# Patient Record
Sex: MALE | Race: WHITE | Employment: OTHER | URBAN - METROPOLITAN AREA
[De-identification: names, ages, dates, MRNs, and addresses within clinical notes are randomized per-mention and may not be internally consistent; named-entity substitution may affect disease eponyms.]

---

## 2017-01-25 RX ORDER — LISINOPRIL AND HYDROCHLOROTHIAZIDE 25; 20 MG/1; MG/1
1 TABLET ORAL
Qty: 90 TABLET | Refills: 0 | OUTPATIENT
Start: 2017-01-25

## 2017-01-25 NOTE — TELEPHONE ENCOUNTER
From: Jose L Marcial  To:  Anthony Coffman MD  Sent: 1/23/2017 9:31 AM CST  Subject: Medication Renewal Request    Original authorizing provider: MD Jose L Melendez would like a refill of the following medications:  Lisinopril-Hydrochloroth

## 2017-01-25 NOTE — TELEPHONE ENCOUNTER
Hypertensive Medications: Protocol failed, please advise on prescription request for Lisinopril-HCTZ. LOV >6 months No future OV appt noted.     Protocol Criteria:  · Appointment scheduled in the past 6 months or in the next 3 months  · BMP or CMP in th

## 2017-01-27 NOTE — TELEPHONE ENCOUNTER
Dr. Tejinder Santana - Appointment scheduled 1/31    States will be out of medication by Sunday. Requesting refill.

## 2017-01-28 RX ORDER — LISINOPRIL AND HYDROCHLOROTHIAZIDE 25; 20 MG/1; MG/1
1 TABLET ORAL
Qty: 90 TABLET | Refills: 0 | Status: SHIPPED | OUTPATIENT
Start: 2017-01-28 | End: 2017-01-31

## 2017-01-31 ENCOUNTER — OFFICE VISIT (OUTPATIENT)
Dept: FAMILY MEDICINE CLINIC | Facility: CLINIC | Age: 82
End: 2017-01-31

## 2017-01-31 VITALS
HEART RATE: 65 BPM | SYSTOLIC BLOOD PRESSURE: 134 MMHG | TEMPERATURE: 98 F | DIASTOLIC BLOOD PRESSURE: 82 MMHG | BODY MASS INDEX: 34 KG/M2 | WEIGHT: 230 LBS

## 2017-01-31 DIAGNOSIS — I10 ESSENTIAL HYPERTENSION: Primary | ICD-10-CM

## 2017-01-31 PROCEDURE — 99213 OFFICE O/P EST LOW 20 MIN: CPT | Performed by: FAMILY MEDICINE

## 2017-01-31 PROCEDURE — G0463 HOSPITAL OUTPT CLINIC VISIT: HCPCS | Performed by: FAMILY MEDICINE

## 2017-01-31 RX ORDER — LISINOPRIL AND HYDROCHLOROTHIAZIDE 25; 20 MG/1; MG/1
1 TABLET ORAL
Qty: 90 TABLET | Refills: 1 | Status: SHIPPED | OUTPATIENT
Start: 2017-01-31 | End: 2017-04-29

## 2017-02-01 NOTE — PROGRESS NOTES
HPI:    Patient ID: Leeann Ibanez is a 80year old male. HTN  This is a chronic problem. The current episode started more than 1 year ago. The problem is controlled. Associated symptoms include malaise/fatigue.  Pertinent negatives include no anxiety, diagnosis)  cpm    Orders Placed This Encounter  Comp Metabolic Panel (14) [E]  TSH [E]  CBC W Differential W Platelet [E]    Meds This Visit:  Signed Prescriptions Disp Refills    Lisinopril-Hydrochlorothiazide 20-25 MG Oral Tab 90 tablet 1      Sig: Take

## 2017-03-13 RX ORDER — LEVETIRACETAM 750 MG/1
750 TABLET ORAL 2 TIMES DAILY
Qty: 180 TABLET | Refills: 0 | Status: SHIPPED | OUTPATIENT
Start: 2017-03-13 | End: 2017-06-19

## 2017-03-13 NOTE — TELEPHONE ENCOUNTER
Drug: Levetiracetam 750 mg    Last refill: 12/19/2017    Last office visit: 02/18/2016    Next appointment: No future appt

## 2017-03-13 NOTE — TELEPHONE ENCOUNTER
From: Charmain Prader  To: Beau Al MD  Sent: 3/11/2017 10:55 AM CST  Subject: Medication Renewal Request    Original authorizing provider: Jovanni Lang MD, MD Charmain Prader would like a refill of the following medications:  levetiracetam

## 2017-04-20 ENCOUNTER — LAB ENCOUNTER (OUTPATIENT)
Dept: LAB | Age: 82
End: 2017-04-20
Attending: RADIOLOGY
Payer: MEDICARE

## 2017-04-20 DIAGNOSIS — C61 MALIGNANT NEOPLASM OF PROSTATE (HCC): Primary | ICD-10-CM

## 2017-04-20 DIAGNOSIS — I10 ESSENTIAL HYPERTENSION: ICD-10-CM

## 2017-04-20 PROCEDURE — 85025 COMPLETE CBC W/AUTO DIFF WBC: CPT

## 2017-04-20 PROCEDURE — 80053 COMPREHEN METABOLIC PANEL: CPT

## 2017-04-20 PROCEDURE — 84153 ASSAY OF PSA TOTAL: CPT

## 2017-04-20 PROCEDURE — 84443 ASSAY THYROID STIM HORMONE: CPT

## 2017-04-20 PROCEDURE — 36415 COLL VENOUS BLD VENIPUNCTURE: CPT

## 2017-04-29 RX ORDER — LISINOPRIL AND HYDROCHLOROTHIAZIDE 25; 20 MG/1; MG/1
1 TABLET ORAL
Qty: 90 TABLET | Refills: 0 | Status: SHIPPED | OUTPATIENT
Start: 2017-04-29 | End: 2017-07-31

## 2017-04-29 NOTE — TELEPHONE ENCOUNTER
Refilled per written protocol.     Hypertensive Medications  Protocol Criteria:  · Appointment scheduled in the past 6 months or in the next 3 months  · BMP or CMP in the past 12 months  · Creatinine result < 2  Recent Visits       Provider Department [facility]

## 2017-04-29 NOTE — TELEPHONE ENCOUNTER
From: Sonja Casiano  To:  Cecil Draper MD  Sent: 4/24/2017 11:41 AM CDT  Subject: Medication Renewal Request    Original authorizing provider: MD Sonja Fitch would like a refill of the following medications:  Kalee Rodriguez

## 2017-05-03 ENCOUNTER — OFFICE VISIT (OUTPATIENT)
Dept: FAMILY MEDICINE CLINIC | Facility: CLINIC | Age: 82
End: 2017-05-03

## 2017-05-03 VITALS
WEIGHT: 230 LBS | TEMPERATURE: 98 F | HEART RATE: 69 BPM | DIASTOLIC BLOOD PRESSURE: 80 MMHG | SYSTOLIC BLOOD PRESSURE: 127 MMHG | BODY MASS INDEX: 34 KG/M2

## 2017-05-03 DIAGNOSIS — R22.32 FINGER MASS, LEFT: Primary | ICD-10-CM

## 2017-05-03 DIAGNOSIS — J30.89 NON-SEASONAL ALLERGIC RHINITIS, UNSPECIFIED ALLERGIC RHINITIS TRIGGER: ICD-10-CM

## 2017-05-03 PROCEDURE — G0463 HOSPITAL OUTPT CLINIC VISIT: HCPCS | Performed by: FAMILY MEDICINE

## 2017-05-03 PROCEDURE — 99213 OFFICE O/P EST LOW 20 MIN: CPT | Performed by: FAMILY MEDICINE

## 2017-05-03 RX ORDER — FLUTICASONE PROPIONATE 50 MCG
2 SPRAY, SUSPENSION (ML) NASAL DAILY
Qty: 1 BOTTLE | Refills: 3 | Status: SHIPPED | OUTPATIENT
Start: 2017-05-03 | End: 2018-04-28

## 2017-05-03 NOTE — PROGRESS NOTES
HPI:    Patient ID: Javon Rojas is a 80year old male. HPI    Review of Systems   Constitutional: Positive for fatigue. Negative for chills, diaphoresis, activity change and appetite change. HENT: Positive for postnasal drip.  Negative for dental p Suspension 1 Bottle 3      Si sprays by Each Nare route daily.            Imaging & Referrals:  None       #[id number]

## 2017-06-25 RX ORDER — LEVETIRACETAM 750 MG/1
TABLET ORAL
Qty: 180 TABLET | Refills: 0 | Status: SHIPPED | OUTPATIENT
Start: 2017-06-25 | End: 2017-09-14

## 2017-08-02 RX ORDER — TAMSULOSIN HYDROCHLORIDE 0.4 MG/1
0.4 CAPSULE ORAL DAILY
Qty: 90 CAPSULE | Refills: 1 | Status: SHIPPED
Start: 2017-08-02 | End: 2018-02-05

## 2017-08-06 RX ORDER — LISINOPRIL AND HYDROCHLOROTHIAZIDE 25; 20 MG/1; MG/1
1 TABLET ORAL
Qty: 90 TABLET | Refills: 0 | Status: CANCELLED
Start: 2017-08-06

## 2017-08-07 RX ORDER — LISINOPRIL AND HYDROCHLOROTHIAZIDE 25; 20 MG/1; MG/1
1 TABLET ORAL
Qty: 90 TABLET | Refills: 0 | Status: SHIPPED | OUTPATIENT
Start: 2017-08-07 | End: 2017-12-18

## 2017-08-07 NOTE — TELEPHONE ENCOUNTER
From: Denise Kimble  Sent: 7/31/2017 1:01 PM CDT  Subject: Medication Renewal Request    Denise Kimble would like a refill of the following medications:  Lisinopril-Hydrochlorothiazide 20-25 MG Oral Tab Brendon Mckinley MD]    Preferred pharmacy: [facility]

## 2017-08-07 NOTE — TELEPHONE ENCOUNTER
Hypertensive Medications  Protocol Criteria:  · Appointment scheduled in the past 6 months or in the next 3 months  · BMP or CMP in the past 12 months  · Creatinine result < 2  Recent Outpatient Visits            3 months ago Finger mass, left    [place]

## 2017-08-09 NOTE — TELEPHONE ENCOUNTER
From: Javon Rojas  Sent: 8/6/2017 7:00 PM CDT  Subject: Medication Renewal Request    Javon Rojas would like a refill of the following medications:  Lisinopril-Hydrochlorothiazide 20-25 MG Oral Tab Nancy Ellison MD]    Preferred pharmacy: [facility]

## 2017-08-28 ENCOUNTER — TELEPHONE (OUTPATIENT)
Dept: OPHTHALMOLOGY | Facility: CLINIC | Age: 82
End: 2017-08-28

## 2017-08-28 NOTE — TELEPHONE ENCOUNTER
Pt would appt in October, states his vision has changed and needs new prescription for drivers test. Pls advise thank you.

## 2017-09-09 ENCOUNTER — TELEPHONE (OUTPATIENT)
Dept: NEUROLOGY | Facility: CLINIC | Age: 82
End: 2017-09-09

## 2017-09-09 RX ORDER — LEVETIRACETAM 750 MG/1
TABLET ORAL
Qty: 180 TABLET | Refills: 0 | Status: CANCELLED
Start: 2017-09-09

## 2017-09-11 NOTE — TELEPHONE ENCOUNTER
From: Dillon Doe  Sent: 9/9/2017 11:17 AM CDT  Subject: Medication Renewal Request    Dillon Doe would like a refill of the following medications:  LEVETIRACETAM 750 MG Oral Tab Carmine Donohue MD, MD]    [facility] [address] [phone], [phone]    Comment:

## 2017-09-14 ENCOUNTER — OFFICE VISIT (OUTPATIENT)
Dept: NEUROLOGY | Facility: CLINIC | Age: 82
End: 2017-09-14

## 2017-09-14 VITALS
SYSTOLIC BLOOD PRESSURE: 130 MMHG | RESPIRATION RATE: 17 BRPM | DIASTOLIC BLOOD PRESSURE: 78 MMHG | HEART RATE: 68 BPM | BODY MASS INDEX: 33.45 KG/M2 | WEIGHT: 231 LBS | HEIGHT: 69.5 IN

## 2017-09-14 DIAGNOSIS — G40.909 SEIZURE DISORDER (HCC): Primary | ICD-10-CM

## 2017-09-14 DIAGNOSIS — G47.9 SLEEP DISORDER: ICD-10-CM

## 2017-09-14 PROCEDURE — 99213 OFFICE O/P EST LOW 20 MIN: CPT | Performed by: OTHER

## 2017-09-14 RX ORDER — LEVETIRACETAM 750 MG/1
750 TABLET ORAL 2 TIMES DAILY
Qty: 180 TABLET | Refills: 3 | Status: SHIPPED | OUTPATIENT
Start: 2017-09-14 | End: 2018-03-06

## 2017-09-14 NOTE — PROGRESS NOTES
Javon Rojas : 1929     HPI:   Patient presents with:  TIA: pt here for follow up on seizures and post mini stroke 6-7 years ago. last seizure over 3 years ago.  pt c/o inability to sleep and fatigue and dizzy spells when bending over and going Prostate cancer [facility])    • Seizure disorder ([facility] [address]    • TIA (transient ischemic attack)    • Unspecified essential hypertension       Past Surgical History:  No date: APPENDECTOMY  2/17/14: CATARACT EXTRACTION W/  INTRAOCULAR LENS IMPLA* Right      Comment: breath, wheezing or cough   CARDIOVASCULAR: denies chest pain or PINO; no palpitations   GI: denies nausea, vomiting, constipation, diarrhea; no heartburn  GENITAL/: Frequency and nocturia  MUSCULOSKELETAL: no joint complaints upper or lower extremities poor nighttime sleep. We discussed the sleep study, but he feels that the problem is related to his nocturia. I asked him to call me if he should develop any worsening of his symptoms. Thank you very much. Return in about 1 year (around 9/14/2018).

## 2017-09-26 ENCOUNTER — OFFICE VISIT (OUTPATIENT)
Dept: OPHTHALMOLOGY | Facility: CLINIC | Age: 82
End: 2017-09-26

## 2017-09-26 DIAGNOSIS — H43.391 VITREOUS FLOATERS OF RIGHT EYE: Primary | ICD-10-CM

## 2017-09-26 DIAGNOSIS — H35.89 RPE MOTTLING OF MACULA: ICD-10-CM

## 2017-09-26 DIAGNOSIS — Z96.1 PSEUDOPHAKIA OF BOTH EYES: ICD-10-CM

## 2017-09-26 DIAGNOSIS — Z98.890 HISTORY OF VITRECTOMY: ICD-10-CM

## 2017-09-26 PROCEDURE — 92014 COMPRE OPH EXAM EST PT 1/>: CPT | Performed by: OPHTHALMOLOGY

## 2017-09-26 PROCEDURE — 92015 DETERMINE REFRACTIVE STATE: CPT | Performed by: OPHTHALMOLOGY

## 2017-09-26 NOTE — PATIENT INSTRUCTIONS
RPE mottling of macula left eye  Continue to follow up with Dr. Alex Louise as planned. Pseudophakia of both eyes  No treatment. New glasses today; update as needed. Discussed that new prescription is only a slight change.    's license vision form

## 2017-09-26 NOTE — ASSESSMENT & PLAN NOTE
Patient had vitrectomy in the left eye in May of 2016 with Dr. Flavia Pimentel. Continue to follow up with Dr. Flavia Pimentel as directed.

## 2017-09-26 NOTE — ASSESSMENT & PLAN NOTE
No treatment. New glasses today; update as needed. Discussed that new prescription is only a slight change. 's license vision form completed today.

## 2017-09-26 NOTE — PROGRESS NOTES
Da See is a 80year old male. HPI:     HPI     EP. Patient is here for a complete eye exam.  Patient last saw Dr. Josie Low on  2/24/17 (see note). Patient states that he has a difficult time reading street signs for the past 6 months.   He al mouth once daily. Disp: 90 tablet Rfl: 0   tamsulosin HCl (FLOMAX) 0.4 MG Oral Cap Take 1 capsule (0.4 mg total) by mouth daily. Disp: 90 capsule Rfl: 1   Fluticasone Propionate 50 MCG/ACT Nasal Suspension 2 sprays by Each Nare route daily.  Disp: 1 Bottle [place]    Right -1.00 [place]  20/25- +3.00 20/20    Left -1.50 +1.00 160 20/60- +3.00 20/40-    Type:  Flat top bifocal                 ASSESSMENT/PLAN:     Diagnoses and Plan:     RPE mottling of macula left eye  Continue to follow up with Dr. Josie Low

## 2017-09-27 ENCOUNTER — OFFICE VISIT (OUTPATIENT)
Dept: SURGERY | Facility: CLINIC | Age: 82
End: 2017-09-27

## 2017-09-27 VITALS
HEIGHT: 69 IN | SYSTOLIC BLOOD PRESSURE: 110 MMHG | WEIGHT: 225 LBS | TEMPERATURE: 98 F | BODY MASS INDEX: 33.33 KG/M2 | DIASTOLIC BLOOD PRESSURE: 78 MMHG

## 2017-09-27 DIAGNOSIS — C61 PROSTATE CANCER (HCC): Primary | ICD-10-CM

## 2017-09-27 DIAGNOSIS — N28.1 KIDNEY CYST, ACQUIRED: ICD-10-CM

## 2017-09-27 DIAGNOSIS — N19 RENAL FAILURE, UNSPECIFIED CHRONICITY: ICD-10-CM

## 2017-09-27 DIAGNOSIS — R35.1 NOCTURIA: ICD-10-CM

## 2017-09-27 LAB
BILIRUB UR QL: NEGATIVE
CLARITY UR: CLEAR
COLOR UR: YELLOW
GLUCOSE UR-MCNC: NEGATIVE MG/DL
HGB UR QL STRIP.AUTO: NEGATIVE
KETONES UR-MCNC: NEGATIVE MG/DL
LEUKOCYTE ESTERASE UR QL STRIP.AUTO: NEGATIVE
NITRITE UR QL STRIP.AUTO: NEGATIVE
PH UR: 5 [PH] (ref 5–8)
PROT UR-MCNC: NEGATIVE MG/DL
SP GR UR STRIP: 1.02 (ref 1–1.03)
UROBILINOGEN UR STRIP-ACNC: <2
VIT C UR-MCNC: NEGATIVE MG/DL

## 2017-09-27 PROCEDURE — 99204 OFFICE O/P NEW MOD 45 MIN: CPT | Performed by: UROLOGY

## 2017-09-27 PROCEDURE — G0463 HOSPITAL OUTPT CLINIC VISIT: HCPCS | Performed by: UROLOGY

## 2017-09-27 NOTE — PROGRESS NOTES
Bernardino Park is a 80year old male. HPI:   Patient presents with:  Prostate Cancer          History provided by pt.     Prostate cancer  1/29/2011 Stage T1c, [place] 3+3 adenocarcinoma of the prostate; low volume; cancer only found in the right base - previous records: 11/29/2011 Office visit with me; Stage T1c, [place] 3+3 adenocarcinoma of the prostate; low volume; cancer only found in the right base - 5% of tissue with other regions being negative; 14 biopsies on November 9, 2011; Normal PSA of 2.4 o Comment: Per NG: adenoma  approx.  2006: KNEE REPLACEMENT SURGERY Left  2014: OTHER SURGICAL HISTORY Right      Comment: Per NG: Post op CME -- [facility] / 2010: VASECTOMY Left      Comment: Cortez ARELLANO: Flo Kawasaki with CME  9/1/10: Bita Mcpherson irritability and lethargy  ENMT:  Negative for ear drainage, hearing loss and nasal drainage  Eyes:  Negative for eye discharge and vision loss  Hema/Lymph:  Negative for easy bleeding and easy bruising  Integumentary:  Negative for pruritus and rash  Musc 50  5/3/2016 PSA = 0.1  5/8/2015 PSA = 0.1      UROLOGICAL IMAGING   11/18/2011 CT abdomen and pelvis with contrast -- The kidneys show symmetric size, enhancement, and excretion, without obstructive uropathy or hydroureteronephrosis.  Right kidney contains renal cysts. Patient will receive Kidney US before his next visit.     (R35.1) Nocturia  His American urologic Association voiding score is  11,  moderate  voiding dysfunction category.  I fully explained benefits, risks, alternatives of continuing tamsulos can contribute to waking up at night to urinate. 3. If your legs swell during the day, please keep your legs elevated at night since swollen legs can contribute to waking up at night to urinate.      4. Please significantly restrict fluids for 3 hours b

## 2017-09-27 NOTE — PATIENT INSTRUCTIONS
1. Urine specimen today for complete urinalysis    2. Kidney ultrasound to investigate mildly impaired kidney function; it will also give us know up information on the cyst that he had in your kidneys on CT 2011    3.   Continue tamsulosin 0.4 mg daily of rest, exercise, and eat well. This will give your body the extra strength it needs right now. Also, look to family and friends for support and comfort. Regular walking can help control some side effects.    Family and friends  You will decide when yo treatment (and during chemotherapy), be sure to take steps to prevent pregnancy. Will treatment make me radioactive?   If you are worried about treatment making you radioactive, know that:   · External radiation therapy or X-ray therapy of any kind will no

## 2017-10-02 ENCOUNTER — HOSPITAL ENCOUNTER (OUTPATIENT)
Dept: ULTRASOUND IMAGING | Facility: HOSPITAL | Age: 82
Discharge: HOME | End: 2017-10-02
Attending: UROLOGY
Payer: MEDICARE

## 2017-10-02 DIAGNOSIS — N28.1 KIDNEY CYST, ACQUIRED: ICD-10-CM

## 2017-10-02 DIAGNOSIS — N19 RENAL FAILURE, UNSPECIFIED CHRONICITY: ICD-10-CM

## 2017-10-02 PROCEDURE — 76770 US EXAM ABDO BACK WALL COMP: CPT | Performed by: UROLOGY

## 2017-10-03 ENCOUNTER — OFFICE VISIT (OUTPATIENT)
Dept: PODIATRY CLINIC | Facility: CLINIC | Age: 82
End: 2017-10-03

## 2017-10-03 DIAGNOSIS — M79.675 PAIN IN TOES OF BOTH FEET: Primary | ICD-10-CM

## 2017-10-03 DIAGNOSIS — B35.1 ONYCHOMYCOSIS: ICD-10-CM

## 2017-10-03 DIAGNOSIS — M79.674 PAIN IN TOES OF BOTH FEET: Primary | ICD-10-CM

## 2017-10-03 PROCEDURE — 99212 OFFICE O/P EST SF 10 MIN: CPT | Performed by: PODIATRIST

## 2017-10-03 PROCEDURE — 11720 DEBRIDE NAIL 1-5: CPT | Performed by: PODIATRIST

## 2017-10-03 NOTE — PROGRESS NOTES
HPI:    Patient ID: Bernardino Park is a 80year old male. HPI  Pleasant 26-year-old male presents to me today as a new patient.   He has been a patient of Dr. Saturnino Kasper in the past.  Patient's primary complaint today is pain associated with both of his gr infection no open or draining was noted.   I anticipate relief by this care and will see patient for treatment if and when symptoms redevelop         ASSESSMENT/PLAN:   Pain in toes of both feet  (primary encounter diagnosis)  Onychomycosis    No orders of

## 2017-10-06 ENCOUNTER — PATIENT MESSAGE (OUTPATIENT)
Dept: SURGERY | Facility: CLINIC | Age: 82
End: 2017-10-06

## 2017-10-06 NOTE — TELEPHONE ENCOUNTER
I sent patient hte following message by [facility] chart\" =      10/2/17  Kidney ultrasound showed NO tumors or stones or blockage of the kidneys;   It did show both kidneys to have simple cysts, but these are common, do NOT cause any problems, are NOT pre-canc

## 2017-10-06 NOTE — TELEPHONE ENCOUNTER
From: Jewell Salcido  To: Adolph Schreiber MD  Sent: 10/6/2017 3:58 PM CDT  Subject: Test Results Question    What are the results of my 10/2/2017 kidney ultrasound test?

## 2017-12-19 NOTE — TELEPHONE ENCOUNTER
please advise as does not meet RN protocol for refill criteria    Hypertensive Medications  Protocol Criteria:  · Appointment scheduled in the past 6 months or in the next 3 months  · BMP or CMP in the past 12 months  · Creatinine result < 2  Recent Outpa

## 2017-12-21 RX ORDER — LISINOPRIL AND HYDROCHLOROTHIAZIDE 25; 20 MG/1; MG/1
1 TABLET ORAL
Qty: 90 TABLET | Refills: 0 | Status: SHIPPED | OUTPATIENT
Start: 2017-12-21 | End: 2018-04-27

## 2018-02-01 ENCOUNTER — APPOINTMENT (OUTPATIENT)
Dept: RADIATION ONCOLOGY | Facility: HOSPITAL | Age: 83
End: 2018-02-01
Attending: RADIOLOGY
Payer: MEDICARE

## 2018-02-05 RX ORDER — TAMSULOSIN HYDROCHLORIDE 0.4 MG/1
0.4 CAPSULE ORAL DAILY
Qty: 90 CAPSULE | Refills: 1
Start: 2018-02-05 | End: 2018-02-13

## 2018-02-05 NOTE — TELEPHONE ENCOUNTER
From: Wanda Vasquez  Sent: 2/5/2018 3:27 PM CST  Subject: Medication Renewal Request    Wanda Vasquez would like a refill of the following medications:     tamsulosin HCl (FLOMAX) 0.4 MG Oral Cap Daniel Navarro MD]   Patient Comment[facility] Please mail R x to me. I have to find new pharmacy!     Preferred pharmacy: Other - To be determined

## 2018-02-13 RX ORDER — TAMSULOSIN HYDROCHLORIDE 0.4 MG/1
0.4 CAPSULE ORAL DAILY
Qty: 90 CAPSULE | Refills: 1 | Status: SHIPPED | OUTPATIENT
Start: 2018-02-13 | End: 2019-05-17

## 2018-03-06 ENCOUNTER — PATIENT MESSAGE (OUTPATIENT)
Dept: NEUROLOGY | Facility: CLINIC | Age: 83
End: 2018-03-06

## 2018-03-06 RX ORDER — LEVETIRACETAM 750 MG/1
750 TABLET ORAL 2 TIMES DAILY
Qty: 180 TABLET | Refills: 1 | Status: SHIPPED | OUTPATIENT
Start: 2018-03-06 | End: 2018-09-05

## 2018-03-06 NOTE — TELEPHONE ENCOUNTER
From: Daiana Salinas  To: Elvis Paul MD  Sent: 3/6/2018 2:28 PM CST  Subject: Other    I currently have a prescription for three refills of Keppra. My pharmacy, [address] [place] [address] pharmacy, recently closed.  I need a written prescription from the doctor in order t

## 2018-03-07 ENCOUNTER — PATIENT MESSAGE (OUTPATIENT)
Dept: SURGERY | Facility: CLINIC | Age: 83
End: 2018-03-07

## 2018-03-08 NOTE — TELEPHONE ENCOUNTER
From: Daiana Salinas  To: Avelino Baker MD  Sent: 3/7/2018 9:48 AM CST  Subject: Other    Do I need a PSA blood test prior to my appointment with you on April 27, 2018. Please send me an order, if so.

## 2018-04-09 ENCOUNTER — OFFICE VISIT (OUTPATIENT)
Dept: DERMATOLOGY CLINIC | Facility: CLINIC | Age: 83
End: 2018-04-09

## 2018-04-09 ENCOUNTER — APPOINTMENT (OUTPATIENT)
Dept: LAB | Age: 83
End: 2018-04-09
Attending: UROLOGY
Payer: MEDICARE

## 2018-04-09 DIAGNOSIS — L30.8 PSORIASIFORM DERMATITIS: ICD-10-CM

## 2018-04-09 DIAGNOSIS — L82.0 INFLAMED SEBORRHEIC KERATOSIS: Primary | ICD-10-CM

## 2018-04-09 DIAGNOSIS — D23.5 BENIGN NEOPLASM OF SKIN OF TRUNK, EXCEPT SCROTUM: ICD-10-CM

## 2018-04-09 DIAGNOSIS — D23.4 BENIGN NEOPLASM OF SCALP AND SKIN OF NECK: ICD-10-CM

## 2018-04-09 DIAGNOSIS — D23.60 BENIGN NEOPLASM OF SKIN OF UPPER LIMB, INCLUDING SHOULDER, UNSPECIFIED LATERALITY: ICD-10-CM

## 2018-04-09 DIAGNOSIS — R35.1 NOCTURIA: ICD-10-CM

## 2018-04-09 DIAGNOSIS — D23.30 BENIGN NEOPLASM OF SKIN OF FACE: ICD-10-CM

## 2018-04-09 DIAGNOSIS — C61 PROSTATE CANCER (HCC): ICD-10-CM

## 2018-04-09 PROCEDURE — 99213 OFFICE O/P EST LOW 20 MIN: CPT | Performed by: DERMATOLOGY

## 2018-04-09 PROCEDURE — 84153 ASSAY OF PSA TOTAL: CPT

## 2018-04-09 PROCEDURE — G0463 HOSPITAL OUTPT CLINIC VISIT: HCPCS | Performed by: DERMATOLOGY

## 2018-04-09 PROCEDURE — 36415 COLL VENOUS BLD VENIPUNCTURE: CPT

## 2018-04-09 PROCEDURE — 81003 URINALYSIS AUTO W/O SCOPE: CPT

## 2018-04-10 ENCOUNTER — PATIENT MESSAGE (OUTPATIENT)
Dept: NEUROLOGY | Facility: CLINIC | Age: 83
End: 2018-04-10

## 2018-04-10 NOTE — TELEPHONE ENCOUNTER
From: Carlos Kern  To: Hyun Le MD  Sent: 4/10/2018 2:37 PM CDT  Subject: Other    First of all, let me thank you for treating me for my condition all these years.  While it can not be completely eliminated, you have managed to keep it under co

## 2018-04-18 ENCOUNTER — OFFICE VISIT (OUTPATIENT)
Dept: OTOLARYNGOLOGY | Facility: CLINIC | Age: 83
End: 2018-04-18

## 2018-04-18 VITALS
TEMPERATURE: 97 F | HEIGHT: 69.5 IN | SYSTOLIC BLOOD PRESSURE: 129 MMHG | WEIGHT: 225 LBS | BODY MASS INDEX: 32.58 KG/M2 | DIASTOLIC BLOOD PRESSURE: 67 MMHG

## 2018-04-18 DIAGNOSIS — R09.81 NASAL CONGESTION: ICD-10-CM

## 2018-04-18 DIAGNOSIS — H61.21 IMPACTED CERUMEN OF RIGHT EAR: Primary | ICD-10-CM

## 2018-04-18 PROCEDURE — 69210 REMOVE IMPACTED EAR WAX UNI: CPT | Performed by: OTOLARYNGOLOGY

## 2018-04-18 PROCEDURE — 99214 OFFICE O/P EST MOD 30 MIN: CPT | Performed by: OTOLARYNGOLOGY

## 2018-04-18 NOTE — PROGRESS NOTES
Shawnee Tolentino is a 80year old male.   Patient presents with:  Nose Problem: pt reports post nasal drip, stuffy nose, running nose, dry mouth, cant breath through nose going on for 1 year      HISTORY OF PRESENT ILLNESS  4/18/2018     The patient presents macular edema) 4/18/14    IVK injections by Dr. Guille Donald   • H/O prostate biopsy 2011   • High cholesterol    • Hyperlipidemia    • Prostate cancer [facility])    • Seizure disorder (HCC)    • TIA (transient ischemic attack)    • Unspecified essential hypertensi Normal. Palpation - Normal without lymphadenopathy.  Parotid gland - Normal. Thyroid gland - Normal.   Eyes Normal Conjunctiva - Right: Normal, Left: Normal. Pupil - Right: Normal, Left: Normal. Fundus - Right: Normal, Left: Normal. EOMI   Neurological Norm of hearing loss we consider this age-related and do not do any further workup. If patients have communication issues we recommend hearing aids.   This patient is medically cleared for hearing aids      - REMOVAL IMPACTED CERUMEN REQUIRING INSTRUMENTATION,

## 2018-04-22 NOTE — PROGRESS NOTES
Jose L Marcial is a 80year old male. HPI:     CC:  Patient presents with:  Derm Problem: established pt. presents for 20 months F/U for skine xam to face/head only. Pt with multiple brown, crusty lesions to scalp and temples. No hx of skin CA.          A LISINOPRIL-HYDROCHLOROTHIAZIDE 20-25 MG Oral Tab Take 1 tablet by mouth once daily. Disp: 90 tablet Rfl: 0   Fluticasone Propionate 50 MCG/ACT Nasal Suspension 2 sprays by Each Nare route daily.  Disp: 1 Bottle Rfl: 3   aspirin (ASPIR-81) 81 MG Oral Tab E The patient does not use an assistive device. .      The patient does live in a home with stairs.          Family History   Problem Relation Age of Onset   • Diabetes Neg    • Glaucoma Neg    • Macular degeneration Neg        There were no vitals filed fo encounter diagnosis)  Benign neoplasm of skin of face  Benign neoplasm of scalp and skin of neck  Benign neoplasm of skin of trunk, except scrotum  Benign neoplasm of skin of upper limb, including shoulder, unspecified laterality  Psoriasiform dermatitis

## 2018-04-25 ENCOUNTER — OFFICE VISIT (OUTPATIENT)
Dept: PODIATRY CLINIC | Facility: CLINIC | Age: 83
End: 2018-04-25

## 2018-04-25 DIAGNOSIS — M79.674 PAIN IN TOES OF BOTH FEET: Primary | ICD-10-CM

## 2018-04-25 DIAGNOSIS — M79.675 PAIN IN TOES OF BOTH FEET: Primary | ICD-10-CM

## 2018-04-25 DIAGNOSIS — B35.1 ONYCHOMYCOSIS: ICD-10-CM

## 2018-04-25 PROCEDURE — 11721 DEBRIDE NAIL 6 OR MORE: CPT | Performed by: PODIATRIST

## 2018-04-25 NOTE — PROGRESS NOTES
HPI:    Patient ID: Satya Linton is a 80year old male. HPI  This 17-year-old male presents with recurrent pain associated with his nails. He has had relief by previous debridement.   He has especially painful great toenails both medial and lateral b types were placed in this encounter.       Meds This Visit:  No prescriptions requested or ordered in this encounter    Imaging & Referrals:  None       #[id number]

## 2018-04-27 ENCOUNTER — OFFICE VISIT (OUTPATIENT)
Dept: SURGERY | Facility: CLINIC | Age: 83
End: 2018-04-27

## 2018-04-27 VITALS
BODY MASS INDEX: 33.33 KG/M2 | TEMPERATURE: 98 F | SYSTOLIC BLOOD PRESSURE: 159 MMHG | RESPIRATION RATE: 18 BRPM | HEIGHT: 69 IN | WEIGHT: 225 LBS | HEART RATE: 65 BPM | DIASTOLIC BLOOD PRESSURE: 80 MMHG

## 2018-04-27 DIAGNOSIS — C61 PROSTATE CANCER (HCC): Primary | ICD-10-CM

## 2018-04-27 DIAGNOSIS — N19 RENAL FAILURE, UNSPECIFIED CHRONICITY: ICD-10-CM

## 2018-04-27 DIAGNOSIS — R35.1 NOCTURIA: ICD-10-CM

## 2018-04-27 DIAGNOSIS — N28.1 KIDNEY CYST, ACQUIRED: ICD-10-CM

## 2018-04-27 PROCEDURE — G0463 HOSPITAL OUTPT CLINIC VISIT: HCPCS | Performed by: UROLOGY

## 2018-04-27 PROCEDURE — 99214 OFFICE O/P EST MOD 30 MIN: CPT | Performed by: UROLOGY

## 2018-04-27 RX ORDER — TAMSULOSIN HYDROCHLORIDE 0.4 MG/1
0.4 CAPSULE ORAL DAILY
Qty: 90 CAPSULE | Refills: 3 | Status: SHIPPED | OUTPATIENT
Start: 2018-04-27 | End: 2018-05-04

## 2018-04-27 NOTE — PATIENT INSTRUCTIONS
1.  Continue tamsulosin 0.4 mg daily    2   visit in 1 year. Blood draw for PSA and urinalysis urine test before the visit.    ------------------------------------------    1.  Because tea and coffee contain caffeine, the standard recommendation is not to

## 2018-04-28 NOTE — TELEPHONE ENCOUNTER
From: Page Garcia  Sent: 4/27/2018 6:27 PM CDT  Subject: Medication Renewal Request    Page Garcia would like a refill of the following medications:     LISINOPRIL-HYDROCHLOROTHIAZIDE 20-25 MG Oral Tab Tommy Rowe MD]    Preferred pharmacy: [facility]

## 2018-04-28 NOTE — TELEPHONE ENCOUNTER
Requesting Lisinopril-HCTZ refill    Failed IM/FM refill protocol, please advise    Hypertensive Medications  Protocol Criteria:  · Appointment scheduled in the past 6 months or in the next 3 months  · BMP or CMP in the past 12 months  · Creatinine result

## 2018-04-28 NOTE — PROGRESS NOTES
Lyndsey Olguin is a 80year old male. HPI:   Patient presents with:  Prostate Cancer          History provided by pt.     Prostate cancer  1/29/2011 Stage T1c, [place] 3+3 adenocarcinoma of the prostate; low volume; cancer only found in the right base - that he snores.          Review of previous records: 11/29/2011 Office visit with me; Stage T1c, [place] 3+3 adenocarcinoma of the prostate; low volume; cancer only found in the right base - 5% of tissue with other regions being negative; 14 biopsies on Nov CHOLECYSTECTOMY  No date: COLONOSCOPY      Comment: Per ADAN: adenoma  approx.  2006: KNEE REPLACEMENT SURGERY Left  2014: OTHER SURGICAL HISTORY Right      Comment: Per ADAN: Post op CME -- [facility] / 2010: VASECTOMY Left      Comment: Per ADAN: [facility]    Laboratory Data:  4/9/18 PSA 0.1;   urinalysis normal with blood negative and \"microscopic not indicated\"  4/20/2017 PSA = 0.1; Creatinine = 1.35; eGFR = 50  5/3/2016 PSA = 0.1  5/8/2015 PSA = 0.1      UROLOGICAL IMAGING     10/2/17 u observed.    (N28.1) Kidney cyst, acquired  10/2/17 kidney ultrasound shows multiple bilateral simple renal cysts with no suspicious renal lesions. Asymptomatic.   I explained treatment options and he understands and wants to be observed.    (R35.1) Noctur Please limit alcohol within 4 hours of bedtime as it can contribute to waking up at night to urinate.      6. If you take a lot of medications at bedtime, discuss with your primary physician whether any of these medications can be taken in the morning, so t

## 2018-04-29 RX ORDER — LISINOPRIL AND HYDROCHLOROTHIAZIDE 25; 20 MG/1; MG/1
1 TABLET ORAL
Qty: 90 TABLET | Refills: 0 | Status: SHIPPED
Start: 2018-04-29 | End: 2018-05-04

## 2018-05-04 ENCOUNTER — APPOINTMENT (OUTPATIENT)
Dept: LAB | Age: 83
End: 2018-05-04
Attending: FAMILY MEDICINE
Payer: MEDICARE

## 2018-05-04 ENCOUNTER — LAB ENCOUNTER (OUTPATIENT)
Dept: LAB | Age: 83
End: 2018-05-04
Attending: FAMILY MEDICINE
Payer: MEDICARE

## 2018-05-04 ENCOUNTER — OFFICE VISIT (OUTPATIENT)
Dept: FAMILY MEDICINE CLINIC | Facility: CLINIC | Age: 83
End: 2018-05-04

## 2018-05-04 VITALS
SYSTOLIC BLOOD PRESSURE: 118 MMHG | HEIGHT: 69 IN | HEART RATE: 67 BPM | TEMPERATURE: 98 F | DIASTOLIC BLOOD PRESSURE: 74 MMHG | WEIGHT: 228 LBS | BODY MASS INDEX: 33.77 KG/M2

## 2018-05-04 DIAGNOSIS — H90.5 SENSORINEURAL HEARING LOSS (SNHL), UNSPECIFIED LATERALITY: ICD-10-CM

## 2018-05-04 DIAGNOSIS — R56.9 SEIZURE (HCC): ICD-10-CM

## 2018-05-04 DIAGNOSIS — I10 ESSENTIAL HYPERTENSION: Primary | ICD-10-CM

## 2018-05-04 DIAGNOSIS — Z00.00 ENCOUNTER FOR ANNUAL HEALTH EXAMINATION: ICD-10-CM

## 2018-05-04 PROCEDURE — 93005 ELECTROCARDIOGRAM TRACING: CPT

## 2018-05-04 PROCEDURE — 84443 ASSAY THYROID STIM HORMONE: CPT

## 2018-05-04 PROCEDURE — 85025 COMPLETE CBC W/AUTO DIFF WBC: CPT

## 2018-05-04 PROCEDURE — 93010 ELECTROCARDIOGRAM REPORT: CPT | Performed by: FAMILY MEDICINE

## 2018-05-04 PROCEDURE — G0439 PPPS, SUBSEQ VISIT: HCPCS | Performed by: FAMILY MEDICINE

## 2018-05-04 PROCEDURE — 36415 COLL VENOUS BLD VENIPUNCTURE: CPT

## 2018-05-04 PROCEDURE — 80053 COMPREHEN METABOLIC PANEL: CPT

## 2018-05-04 RX ORDER — HYDROCHLOROTHIAZIDE 25 MG/1
25 TABLET ORAL DAILY
Qty: 90 TABLET | Refills: 3 | Status: SHIPPED | OUTPATIENT
Start: 2018-05-04 | End: 2018-09-19 | Stop reason: DRUGHIGH

## 2018-05-04 NOTE — PATIENT INSTRUCTIONS
[address] SCHEDULE   Tests on this list are recommended by your physician but may not be covered, or covered at this frequency, by your insurer. Please check with your insurance carrier before scheduling to verify coverage.     PREVENTYANET Screening Covered up to Age 76     Colonoscopy Screen   Covered every 10 years- more often if abnormal There are no preventive care reminders to display for this patient.  Update Health Maintenance if applicable    Flex Sigmoidoscopy Screen  Covered every 5 this or any previous visit. This may be covered with your prescription benefits, but Medicare does not cover unless Medically needed    Zoster (Not covered by Medicare Part B) No orders found for this or any previous visit.  This may be covered with your ph

## 2018-05-04 NOTE — PROGRESS NOTES
HPI:   Ernestina Gage is a 80year old male who presents for a Medicare Subsequent Annual Wellness visit (Pt already had Initial Annual Wellness).     Doing well except feels very tired  His last annual assessment has been over 1 year: Annual Physical due Practice)  Jose Dumont MD as Consulting Physician (NEUROLOGY)  Connie Miller MD (Radiation Oncology)  Laney Multani, RN  Susan Fermin, RN    Patient Active Problem List:     Pseudophakia of both eyes     RPE mottling of macula left eye     D 1997 / 2010); colonoscopy; other surgical history (Right, 2014); Cataract extraction w/  intraocular lens implant (Right, 2/17/14);  Cataract extraction w/  intraocular lens implant (Left, 5/3/10); vitrectomy,mechanical (Left, 9/1/10); knee replacement surg hearing conversations in a noisy background such as a crowded room or restaurant:  Yes   I get confused about where sounds come from:  No I misunderstand some words in a sentence and need to ask people to repeat themselves:  Yes   I especially have trouble quadrants,  no masses, no organomegaly   Genitalia: Normal male   Rectal: Normal tone, normal prostate, no masses or tenderness   Extremities: Extremities normal, atraumatic, no cyanosis or edema   Pulses: 2+ and symmetric   Skin: Skin color, texture, turg Diabetes Screening      HbgA1C   Annually No results found for: A1C    No flowsheet data found.     Fasting Blood Sugar (FSB)Annually   Glucose (mg/dL)   Date Value   04/20/2017 87   ----------  GLUCOSE (P) (mg/dL)   Date Value   04/28/2016 119 (H)   ---- with your prescription benefits, but Medicare does not cover unless Medically needed    Zoster   Not covered by Medicare Part B No vaccine history found This may be covered with your pharmacy  prescription benefits      [address] Internal

## 2018-05-15 ENCOUNTER — TELEPHONE (OUTPATIENT)
Dept: OPHTHALMOLOGY | Facility: CLINIC | Age: 83
End: 2018-05-15

## 2018-05-15 NOTE — TELEPHONE ENCOUNTER
Pt states he has had major changes in his vision recently. Having trouble seeing. Please advise. Thank you.

## 2018-05-18 ENCOUNTER — OFFICE VISIT (OUTPATIENT)
Dept: AUDIOLOGY | Facility: CLINIC | Age: 83
End: 2018-05-18

## 2018-05-18 ENCOUNTER — OFFICE VISIT (OUTPATIENT)
Dept: OTOLARYNGOLOGY | Facility: CLINIC | Age: 83
End: 2018-05-18

## 2018-05-18 VITALS
SYSTOLIC BLOOD PRESSURE: 132 MMHG | DIASTOLIC BLOOD PRESSURE: 73 MMHG | HEIGHT: 69 IN | BODY MASS INDEX: 33.77 KG/M2 | WEIGHT: 228 LBS | TEMPERATURE: 97 F

## 2018-05-18 DIAGNOSIS — H90.3 SENSORINEURAL HEARING LOSS, BILATERAL: Primary | ICD-10-CM

## 2018-05-18 DIAGNOSIS — H91.93 BILATERAL HEARING LOSS, UNSPECIFIED HEARING LOSS TYPE: Primary | ICD-10-CM

## 2018-05-18 PROCEDURE — 92557 COMPREHENSIVE HEARING TEST: CPT | Performed by: AUDIOLOGIST

## 2018-05-18 PROCEDURE — 99213 OFFICE O/P EST LOW 20 MIN: CPT | Performed by: OTOLARYNGOLOGY

## 2018-05-18 PROCEDURE — 92567 TYMPANOMETRY: CPT | Performed by: AUDIOLOGIST

## 2018-05-18 PROCEDURE — G0463 HOSPITAL OUTPT CLINIC VISIT: HCPCS | Performed by: OTOLARYNGOLOGY

## 2018-05-18 NOTE — PROGRESS NOTES
AUDIOLOGY REPORT      Sonja Casiano is a 80year old male     Referring Provider: Jefe Dawson   YOB: 1929  Medical Record: [id number]      Patient Hearing History:   Patient had previous test at this office in 2015.     He suspects heari safety/sound awareness issues, social isolation, depression, and cognitive decline.     Recommendations: Follow up with Dr. Nasrin See. Hearing aid evaluation to further explore amplification options. Oumar Messina.   Audiologist

## 2018-05-18 NOTE — PROGRESS NOTES
Sofiya Ariza is a 80year old male. Patient presents with:  Hearing Loss: decreased hearing of both ears    HPI:   He has had difficulty with his hearing for many years. He feels that it is slowly getting worse over time.     Current Outpatient Prescrip Normal. Nasal septum - Normal, Turbinates - Normal   Neurological Normal Memory - Normal. Cranial nerves - Cranial nerves II through XII grossly intact.    Neck Exam Normal Inspection - Normal. Palpation - Normal. Parotid gland - Normal. Thyroid gland - Nor

## 2018-06-26 ENCOUNTER — OFFICE VISIT (OUTPATIENT)
Dept: OPHTHALMOLOGY | Facility: CLINIC | Age: 83
End: 2018-06-26

## 2018-06-26 DIAGNOSIS — Z96.1 PSEUDOPHAKIA OF BOTH EYES: ICD-10-CM

## 2018-06-26 DIAGNOSIS — Z98.890 HISTORY OF VITRECTOMY: ICD-10-CM

## 2018-06-26 DIAGNOSIS — H35.89 RPE MOTTLING OF MACULA: Primary | ICD-10-CM

## 2018-06-26 PROCEDURE — 92015 DETERMINE REFRACTIVE STATE: CPT | Performed by: OPHTHALMOLOGY

## 2018-06-26 PROCEDURE — 92014 COMPRE OPH EXAM EST PT 1/>: CPT | Performed by: OPHTHALMOLOGY

## 2018-06-26 RX ORDER — LEVETIRACETAM 750 MG/1
750 TABLET ORAL 2 TIMES DAILY
Status: ON HOLD | COMMUNITY
End: 2018-09-10

## 2018-06-26 NOTE — PATIENT INSTRUCTIONS
History of vitrectomy  Patient had vitrectomy in the right eye in May of 2016 with Dr. Giancarlo Peter. Patient had a vitrectomy in the left eye in September of 2010 with Dr. Giancarlo Peter. Pseudophakia of both eyes  No treatment.  New glasses today; update a

## 2018-06-26 NOTE — ASSESSMENT & PLAN NOTE
Patient had vitrectomy in the right eye in May of 2016 with Dr. Fina Price. Patient had a vitrectomy in the left eye in September of 2010 with Dr. Fina Price.

## 2018-06-26 NOTE — PROGRESS NOTES
Chacha Ervin is a 80year old male. HPI:     HPI     Pt complains of blurred vision OU at distance with his glasses and would like an updated Rx. Pt needs his DMV form filled out.  Pt has been seeing Dr. Chacha Young once a year and has an upcoming apt i tamsulosin HCl (FLOMAX) 0.4 MG Oral Cap Take 1 capsule (0.4 mg total) by mouth daily. Disp: 90 capsule Rfl: 1   aspirin (ASPIR-81) 81 MG Oral Tab EC Take 1 tablet by mouth daily.  Disp:  Rfl:        Allergies:  No Known Allergies    ROS:       PHYSICAL EX change. 's license vision form completed today. RPE mottling of macula left eye  Continue to follow up with Dr. Trish Colbert as planned. No orders of the defined types were placed in this encounter.       Meds This Visit:    No prescriptions

## 2018-09-05 RX ORDER — LEVETIRACETAM 750 MG/1
750 TABLET ORAL 2 TIMES DAILY
Qty: 180 TABLET | Refills: 0 | Status: ON HOLD | OUTPATIENT
Start: 2018-09-05 | End: 2018-09-10

## 2018-09-05 NOTE — TELEPHONE ENCOUNTER
Refill request for keppra 750 mg, BID, #!80, no refills    LOV: 9/14/17  NOV: 10/24/18 with Dr. Bruno Standing

## 2018-09-05 NOTE — TELEPHONE ENCOUNTER
[address] to call in prescription for levetiracetam 750 mg   Sig: Take 1 by mouth 2 times daily  Qty: 180  Refills: 0    Spoke to [facility]

## 2018-09-06 RX ORDER — LEVETIRACETAM 750 MG/1
TABLET ORAL
Qty: 180 TABLET | Refills: 0 | OUTPATIENT
Start: 2018-09-06

## 2018-09-09 ENCOUNTER — HOSPITAL ENCOUNTER (OUTPATIENT)
Facility: HOSPITAL | Age: 83
Setting detail: OBSERVATION
Discharge: HOME | End: 2018-09-11
Attending: EMERGENCY MEDICINE | Admitting: HOSPITALIST
Payer: MEDICARE

## 2018-09-09 ENCOUNTER — APPOINTMENT (OUTPATIENT)
Dept: GENERAL RADIOLOGY | Facility: HOSPITAL | Age: 83
End: 2018-09-09
Attending: EMERGENCY MEDICINE
Payer: MEDICARE

## 2018-09-09 ENCOUNTER — APPOINTMENT (OUTPATIENT)
Dept: CT IMAGING | Facility: HOSPITAL | Age: 83
End: 2018-09-09
Attending: EMERGENCY MEDICINE
Payer: MEDICARE

## 2018-09-09 DIAGNOSIS — G40.909 SEIZURE DISORDER (HCC): Primary | ICD-10-CM

## 2018-09-09 PROCEDURE — 71045 X-RAY EXAM CHEST 1 VIEW: CPT | Performed by: EMERGENCY MEDICINE

## 2018-09-09 PROCEDURE — 70450 CT HEAD/BRAIN W/O DYE: CPT | Performed by: EMERGENCY MEDICINE

## 2018-09-09 RX ORDER — POTASSIUM CHLORIDE 20 MEQ/1
20 TABLET, EXTENDED RELEASE ORAL DAILY
Status: DISCONTINUED | OUTPATIENT
Start: 2018-09-09 | End: 2018-09-09

## 2018-09-09 RX ORDER — LISINOPRIL 20 MG/1
20 TABLET ORAL DAILY
COMMUNITY
End: 2018-09-19 | Stop reason: DRUGHIGH

## 2018-09-09 RX ORDER — POTASSIUM CHLORIDE 20 MEQ/1
40 TABLET, EXTENDED RELEASE ORAL ONCE
Status: COMPLETED | OUTPATIENT
Start: 2018-09-09 | End: 2018-09-09

## 2018-09-10 PROCEDURE — 95816 EEG AWAKE AND DROWSY: CPT | Performed by: OTHER

## 2018-09-10 PROCEDURE — 99219 INITIAL OBSERVATION CARE,LEVL II: CPT | Performed by: HOSPITALIST

## 2018-09-10 PROCEDURE — 99214 OFFICE O/P EST MOD 30 MIN: CPT | Performed by: OTHER

## 2018-09-10 RX ORDER — DIVALPROEX SODIUM 500 MG/1
1000 TABLET, EXTENDED RELEASE ORAL DAILY
Status: DISCONTINUED | OUTPATIENT
Start: 2018-09-10 | End: 2018-09-11

## 2018-09-10 RX ORDER — SODIUM CHLORIDE 9 MG/ML
INJECTION, SOLUTION INTRAVENOUS CONTINUOUS
Status: DISCONTINUED | OUTPATIENT
Start: 2018-09-10 | End: 2018-09-10

## 2018-09-10 RX ORDER — LEVETIRACETAM 500 MG/1
1000 TABLET ORAL 2 TIMES DAILY
Status: DISCONTINUED | OUTPATIENT
Start: 2018-09-10 | End: 2018-09-11

## 2018-09-10 RX ORDER — HYDROCHLOROTHIAZIDE 25 MG/1
25 TABLET ORAL DAILY
Status: DISCONTINUED | OUTPATIENT
Start: 2018-09-10 | End: 2018-09-11

## 2018-09-10 RX ORDER — MAGNESIUM OXIDE 400 MG (241.3 MG MAGNESIUM) TABLET
1 TABLET NIGHTLY
Status: DISCONTINUED | OUTPATIENT
Start: 2018-09-10 | End: 2018-09-11

## 2018-09-10 RX ORDER — ALFUZOSIN HYDROCHLORIDE 10 MG/1
10 TABLET, EXTENDED RELEASE ORAL
Status: DISCONTINUED | OUTPATIENT
Start: 2018-09-10 | End: 2018-09-11

## 2018-09-10 RX ORDER — HEPARIN SODIUM 5000 [USP'U]/ML
5000 INJECTION, SOLUTION INTRAVENOUS; SUBCUTANEOUS EVERY 12 HOURS SCHEDULED
Status: DISCONTINUED | OUTPATIENT
Start: 2018-09-10 | End: 2018-09-11

## 2018-09-10 RX ORDER — ONDANSETRON 2 MG/ML
4 INJECTION INTRAMUSCULAR; INTRAVENOUS EVERY 6 HOURS PRN
Status: DISCONTINUED | OUTPATIENT
Start: 2018-09-10 | End: 2018-09-11

## 2018-09-10 RX ORDER — MAGNESIUM OXIDE 400 MG (241.3 MG MAGNESIUM) TABLET
800 TABLET ONCE
Status: COMPLETED | OUTPATIENT
Start: 2018-09-10 | End: 2018-09-10

## 2018-09-10 RX ORDER — POTASSIUM CHLORIDE 20 MEQ/1
40 TABLET, EXTENDED RELEASE ORAL EVERY 4 HOURS
Status: COMPLETED | OUTPATIENT
Start: 2018-09-10 | End: 2018-09-10

## 2018-09-10 RX ORDER — LEVETIRACETAM 1000 MG/1
1000 TABLET ORAL 2 TIMES DAILY
Qty: 60 TABLET | Refills: 0 | Status: SHIPPED | OUTPATIENT
Start: 2018-09-10 | End: 2018-10-07

## 2018-09-10 RX ORDER — LEVETIRACETAM 500 MG/1
1000 TABLET ORAL 2 TIMES DAILY
Status: DISCONTINUED | OUTPATIENT
Start: 2018-09-11 | End: 2018-09-10

## 2018-09-10 RX ORDER — SODIUM CHLORIDE 0.9 % (FLUSH) 0.9 %
3 SYRINGE (ML) INJECTION AS NEEDED
Status: DISCONTINUED | OUTPATIENT
Start: 2018-09-10 | End: 2018-09-11

## 2018-09-10 RX ORDER — LISINOPRIL 20 MG/1
20 TABLET ORAL DAILY
Status: DISCONTINUED | OUTPATIENT
Start: 2018-09-10 | End: 2018-09-11

## 2018-09-10 NOTE — PLAN OF CARE
NEUROLOGICAL - ADULT    • Absence of seizures Progressing    • Remains free of injury related to seizure activity Progressing        Patient Centered Care    • Patient preferences are identified and integrated in the patient's plan of care Progressing

## 2018-09-10 NOTE — ED NOTES
Pt to ER after having an unresponsive episode at home.   Family reports that he was sitting at the dinner table when he c/o \"an acrid taste\" in his mouth and then began to stare off and was not responsive for approximately 1-2 minutes, then had a short pe

## 2018-09-10 NOTE — PROGRESS NOTES
[facility]    Discharge Summary    Checo Swanson Patient Status:  Observation    1929 MRN [id number]   Location [facility] 5SW/SE Attending Carl Godinez MD   Hosp Day # 0 PCP Heather Rosales MD     Date of Admission:  returned 2 weeks ago, but he has had no fevers or chills. No cough, sore throat, nausea, vomiting, diarrhea, dysuria, or increased frequency of urination.   The emergency room  interviewed his family who reported that he was seated at the table with the fa signs of UTI on current urinalysis  - Follow up in the outpatient setting. Hypomagnesemia  - Patient was placed on K and mag protocol. Vitals: Blood pressure 145/67, pulse 66, temperature 36.4 °C, temperature source Oral, resp. rate 16, height 175. your doctor or nurse    Bring a paper prescription for each of these medications  · levetiracetam 1000 MG Tabs           Discharge Instructions       Follow up with Neurology for a sleep study  Follow up with your primary care to obtain a Doppler of mir

## 2018-09-10 NOTE — CONSULTS
[facility] HOSP - [facility]    Report of Consultation    Danielfavian Angel Patient Status:  Observation    1929 MRN [id number]   Location [facility] 5SW/SE Attending Kenton Almaguer MD   Hosp Day # 0 PCP Darlean Dance, MD     Date of Admission APPENDECTOMY  2/17/14: CATARACT EXTRACTION W/  INTRAOCULAR LENS IMPLANT; Right      Comment:  FRANCISCO  5/3/10: CATARACT EXTRACTION W/  INTRAOCULAR LENS IMPLANT;  Left      Comment:  FRANCISCO  No date: CHOLECYSTECTOMY  No date: COLONOSCOPY      Comment:  Per NG: rachel Take 1 tablet (750 mg total) by mouth 2 (two) times daily. levetiracetam (KEPPRA) 750 MG Oral Tab Take 750 mg by mouth 2 (two) times daily. hydrochlorothiazide 25 MG Oral Tab Take 1 tablet (25 mg total) by mouth daily.    tamsulosin HCl (FLOMAX) 0.4 MG distally    Sensory Exam:  Light touch sensation- intact in all 4 extremities    Deep Tendon Reflexes:  Biceps 2+ bilateral symmetric  Triceps 2+ bilateral symmetric  Brachioradialis 2 + bilateral symmetric  Patellar 2+ bilateral symmetric  Ankle jerk 2+ b Date: 9/9/2018  ECG Report  Interpretation  -------------------------- Sinus Rhythm -First degree A-V block - occasional PAC -Old inferior infarct.  ABNORMAL When compared with ECG of 05/04/2018 10:10:42 Q wave in aVF now Electronically signed on 09/10/2018

## 2018-09-10 NOTE — PLAN OF CARE
Problem: Patient/Family Goals  Goal: Patient/Family Long Term Goal  Patient's Long Term Goal: Return home    Interventions:  - Follow POC  - Monitor for worsening signs and symptoms.  - See additional Care Plan goals for specific interventions   Outcome: P reconciled by Dr. Jenny May. Notified MD regarding low HR 48 per tele, no new orders. Patient has HR now in 50's.

## 2018-09-10 NOTE — PROGRESS NOTES
Post midnight follow up note. Patient was seen and examined. No complaints at present. Patient states he hasn't had a seizure since last year in November. Around that time they started to decrease his Keppra.   Neuro consult pending  Restarted BP medica

## 2018-09-10 NOTE — PROGRESS NOTES
ADMISSION NOTE    80year old male with h/o seizure disorder presents with 3 staring episodes in 4 hours. Available medical records partially reviewed. Dictation to follow.     Carleen Agosto M.D.  9/10/2018

## 2018-09-10 NOTE — ED PROVIDER NOTES
Patient Seen in: [facility] AND [facility] Emergency Department    History   Patient presents with:  Altered Mental Status (neurologic)    Stated Complaint: ams    HPI    80-year-old male with history of hypertension, hyperlipidemia, seizure disorder, and prosta Right      Comment:  Dr. Trish Colbert        Social History    Tobacco Use      Smoking status: Former Smoker        Quit date: 1960        Years since quittin.6      Smokeless tobacco: Never Used    Alcohol use: No      Alcohol/week: 0.0 oz    Drug normal limits   TROPONIN I - Normal   PROTHROMBIN TIME (PT) - Normal   PTT, ACTIVATED - Normal   CBC WITH DIFFERENTIAL WITH PLATELET    Narrative: The following orders were created for panel order CBC WITH DIFFERENTIAL WITH PLATELET.   Procedure PM                 Disposition and Plan     Clinical Impression:  Seizure disorder ([address]  (primary encounter diagnosis)    Disposition:  Admit  9/9/2018 11:39 pm    Follow-up:  No follow-up provider specified.   We recommend that you schedule follow up care

## 2018-09-10 NOTE — H&P
[facility]    PATIENT'S NAME: Hue Caterina   ATTENDING PHYSICIAN: Johana Burciaga MD   PATIENT ACCOUNT#:   [id number]    LOCATION:  [address] [place] [address] RECORD #:   [id number]       YOB: 1929  ADMISSION DATE:       09/09/20 outpatient setting. However, while staff was preparing for discharge, the patient had a third episode, and it was decided to monitor him overnight, give him the [address] IV, and place on remote telemetry.     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 There was no sinus tenderness. NECK:  Supple. LUNGS:  Decreased breath sounds, but easy respiratory excursions. No wheezes or rhonchi. HEART:  Regular rate and rhythm.   Normal S1, S2.  ABDOMEN:  Soft, nontender, with normoactiv Hyperlipidemia. Continue statin. 3.   History of prostate cancer with increased frequency of urination, but no signs of UTI on current urinalysis. Follow up in the outpatient setting. 4.   Patient did have an episode of bradycardia overnight per RN.

## 2018-09-10 NOTE — PROCEDURES
EEG report    REFERRING PHYSICIAN: Sebastian Cerna MD  PCP and phone number:  Ravi Blanton MD  [phone]    TECHNIQUE: 21 channels of EEG, 2 channels of EOG, and 1 channel of EKG were recorded utilizing the International 10/20 System.  The recordi postdischarge slowing/voltage attenuation was over the left temporal lobe. IMPRESSION:    This is an abnormal EEG. Electrographic seizure was seen in the left temporal lobe.  This was associated with clinical manifestations: As above this constellation o

## 2018-09-11 ENCOUNTER — PRIOR ORIGINAL RECORDS (OUTPATIENT)
Dept: OTHER | Age: 83
End: 2018-09-11

## 2018-09-11 VITALS
TEMPERATURE: 97 F | SYSTOLIC BLOOD PRESSURE: 149 MMHG | RESPIRATION RATE: 18 BRPM | WEIGHT: 228 LBS | OXYGEN SATURATION: 99 % | DIASTOLIC BLOOD PRESSURE: 66 MMHG | HEIGHT: 69 IN | HEART RATE: 63 BPM | BODY MASS INDEX: 33.77 KG/M2

## 2018-09-11 PROCEDURE — 99214 OFFICE O/P EST MOD 30 MIN: CPT | Performed by: OTHER

## 2018-09-11 PROCEDURE — 95951 EEG MONITORING/VIDEORECORD: CPT | Performed by: OTHER

## 2018-09-11 PROCEDURE — 99217 OBSERVATION CARE DISCHARGE: CPT | Performed by: HOSPITALIST

## 2018-09-11 RX ORDER — DIVALPROEX SODIUM 500 MG/1
1000 TABLET, EXTENDED RELEASE ORAL DAILY
Qty: 60 TABLET | Refills: 0 | Status: SHIPPED | OUTPATIENT
Start: 2018-09-12 | End: 2018-10-07

## 2018-09-11 RX ORDER — DIVALPROEX SODIUM 500 MG/1
1000 TABLET, EXTENDED RELEASE ORAL DAILY
Qty: 30 TABLET | Refills: 0 | Status: SHIPPED | OUTPATIENT
Start: 2018-09-12 | End: 2018-09-11

## 2018-09-11 RX ORDER — MAGNESIUM OXIDE 400 MG (241.3 MG MAGNESIUM) TABLET
400 TABLET ONCE
Status: COMPLETED | OUTPATIENT
Start: 2018-09-11 | End: 2018-09-11

## 2018-09-11 NOTE — PROGRESS NOTES
[facility]    Discharge Summary    Ernestina Gage Patient Status:  Observation    1929 MRN [id number]   Location [facility] 5SW/SE Attending Aria Quinn MD   Hosp Day # 0 PCP Juancho Johnson MD     Date of Admission:  fevers or chills. No cough, sore throat, nausea, vomiting, diarrhea, dysuria, or increased frequency of urination.   The emergency room  interviewed his family who reported that he was seated at the table with the family where he had an episode  where he s urination  - no signs of UTI on current urinalysis  - Follow up in the outpatient setting. Hypomagnesemia  - Patient was placed on K and mag protocol. Vitals: Blood pressure 149/66, pulse 63, temperature 36.3 °C, temperature source Oral, resp.  rate tamsulosin HCl 0.4 MG Caps  Commonly known as:  FLOMAX      Take 1 capsule (0.4 mg total) by mouth daily.    Quantity:  90 capsule  Refills:  1           Where to Get Your Medications      Please  your prescriptions at the location directed by your

## 2018-09-11 NOTE — PLAN OF CARE
Problem: Patient/Family Goals  Goal: Patient/Family Long Term Goal  Patient's Long Term Goal: Return home    Interventions:  - Follow POC  - Monitor for worsening signs and symptoms.  - See additional Care Plan goals for specific interventions   Outcome: P activity  - Instruct patient/family to call for assistance with activity based on assessment  Outcome: Progressing  No injury from possible seizure noted. Comments: Pt had low HR on shift per tele. HR: 35-40's. Asymptomatic, rest of VS at baseline.

## 2018-09-11 NOTE — PROCEDURES
Continuous Video EEG report    REFERRING PHYSICIAN: Kelsea Mejias MD    PCP and phone number:  Lori Clement MD  [phone]    TECHNIQUE: 21 channels of EEG, 2 channels of EOG, and 1 channel of EKG were recorded utilizing the International 10/20 Syste

## 2018-09-12 ENCOUNTER — TELEPHONE (OUTPATIENT)
Dept: NEUROLOGY | Facility: CLINIC | Age: 83
End: 2018-09-12

## 2018-09-12 NOTE — TELEPHONE ENCOUNTER
Pt calling stating that he saw Dr. Idalia Heredia in ER and was advised to have a possible sleep study and would like to have it done before seeing Dr. Renny Tillman, please advise if pt should have it done or not.

## 2018-09-13 ENCOUNTER — TELEPHONE (OUTPATIENT)
Dept: NEUROLOGY | Facility: CLINIC | Age: 83
End: 2018-09-13

## 2018-09-13 ENCOUNTER — TELEPHONE (OUTPATIENT)
Dept: FAMILY MEDICINE CLINIC | Facility: CLINIC | Age: 83
End: 2018-09-13

## 2018-09-13 ENCOUNTER — PATIENT OUTREACH (OUTPATIENT)
Dept: CASE MANAGEMENT | Age: 83
End: 2018-09-13

## 2018-09-13 DIAGNOSIS — Z02.9 ENCOUNTERS FOR ADMINISTRATIVE PURPOSE: ICD-10-CM

## 2018-09-13 NOTE — TELEPHONE ENCOUNTER
Spoke with patient's daughter, has upcoming appointment w / on 10/24/18 for a follow up from hospital for seizures, the patient is a former  patient, LOV with  9/14/17, the daughter is requesting a letter to be off of work u

## 2018-09-13 NOTE — PROGRESS NOTES
Initial Post Discharge Follow Up   Discharge Date: 9/11/18  Contact Date: 9/13/2018    Consent Verification:  Assessment Completed With: Patient  Caregiver: Bin chase Horse received per patient?  verbal  HIPAA Verified?   Yes    Discharge Dx:  Kae Zayas (FLOMAX) 0.4 MG Oral Cap Take 1 capsule (0.4 mg total) by mouth daily. Disp: 90 capsule Rfl: 1   aspirin (ASPIR-81) 81 MG Oral Tab EC Take 1 tablet by mouth daily.  Disp:  Rfl:      • When you were leaving the hospital were any medication changes discussed Health  [address] [facility], [address]  [facility]  [phone]          PCP TCM/HFU appointment: scheduled at D/C within 7-14 days  yes     NCM Reviewed/scheduled/rescheduled PCP TCM/HFU appointment with pt:  Yes      Have you made all of your follow up

## 2018-09-13 NOTE — TELEPHONE ENCOUNTER
Pt's dtr called TCM NCM back stating she needs a letter to be off work to monitor pt until a 24 hr caregiver is found. Pt states she called neurology but Dr. Amanda Bailey is not in the office until tomorrow and work is requesting the letter today.   Please advi

## 2018-09-14 DIAGNOSIS — G47.9 SLEEP DISORDER: ICD-10-CM

## 2018-09-14 DIAGNOSIS — G40.909 SEIZURE DISORDER (HCC): Primary | ICD-10-CM

## 2018-09-14 NOTE — TELEPHONE ENCOUNTER
Spoke with patient's daughter, notified letter is ready for , left at .  Would like to know 's thoughts on a smart monitor, she states the patient would wear it and it detects changes that can lead to a seizure and if it would laureano

## 2018-09-14 NOTE — TELEPHONE ENCOUNTER
Letter written (see communications). With regards to prior question about sleep study, order has been placed.

## 2018-09-14 NOTE — TELEPHONE ENCOUNTER
Spoke with daughter, advised that office is closed for today and tomorrow due to moving, will address the note next week, this nurse is requesting fax number so that we can just fax the note to her work.  States that she does not have the fax number, will c

## 2018-09-14 NOTE — TELEPHONE ENCOUNTER
Dr Pancho Tam, please note    Daughter stated that the note should be sent to Karissa MANZANARSE at [facility] [place], fax # [phone]  Daughter asks for copy of the letter to be sent to patient's MyChart please    Please respond to pool: EM [facility] LPN/CMA

## 2018-09-17 ENCOUNTER — PATIENT MESSAGE (OUTPATIENT)
Dept: NEUROLOGY | Facility: CLINIC | Age: 83
End: 2018-09-17

## 2018-09-17 NOTE — TELEPHONE ENCOUNTER
From: Ova Glad  To: Ada Manuel MD  Sent: 9/17/2018 3:16 PM CDT  Subject: Other    I have an appointment with you on Oct.24. Would it be wise to have a sleep study performed prior to see you? If so, would you please arrange it.   Thanks, Peri Mccauley

## 2018-09-19 ENCOUNTER — OFFICE VISIT (OUTPATIENT)
Dept: SLEEP CENTER | Age: 83
End: 2018-09-19
Attending: Other
Payer: MEDICARE

## 2018-09-19 ENCOUNTER — OFFICE VISIT (OUTPATIENT)
Dept: FAMILY MEDICINE CLINIC | Facility: CLINIC | Age: 83
End: 2018-09-19
Payer: MEDICARE

## 2018-09-19 VITALS
SYSTOLIC BLOOD PRESSURE: 114 MMHG | HEART RATE: 67 BPM | HEIGHT: 69 IN | WEIGHT: 229 LBS | BODY MASS INDEX: 33.92 KG/M2 | DIASTOLIC BLOOD PRESSURE: 75 MMHG

## 2018-09-19 DIAGNOSIS — Z76.89 SLEEP CONCERN: Primary | ICD-10-CM

## 2018-09-19 DIAGNOSIS — R42 DIZZINESS: Primary | ICD-10-CM

## 2018-09-19 PROCEDURE — 95810 POLYSOM 6/> YRS 4/> PARAM: CPT

## 2018-09-19 RX ORDER — FOLIC ACID 0.8 MG
1 TABLET ORAL DAILY
Qty: 90 CAPSULE | Refills: 1 | Status: SHIPPED | OUTPATIENT
Start: 2018-09-19 | End: 2018-10-19

## 2018-09-19 RX ORDER — LISINOPRIL AND HYDROCHLOROTHIAZIDE 25; 20 MG/1; MG/1
1 TABLET ORAL DAILY
COMMUNITY
End: 2019-04-02

## 2018-09-19 NOTE — PROGRESS NOTES
HPI:    Patient ID: Mary Ellen Farfan is a 80year old male. Had seizure episode on 9/9/2018. .. Review of Systems         Current Outpatient Medications:  Lisinopril-Hydrochlorothiazide 20-25 MG Oral Tab Take 1 tablet by mouth daily.  Disp:  Rfl: am aware of an inpatient discharge within the last 30 days. The discharge medication list has been reconciled with the patient's current medication list and reviewed by me.   See medication list for additions of new medication, and changes to current doses not drink alcohol or use drugs.      ROS:   GENERAL: weight stable, energy stable, no sweating, feels more tired since on medication  SKIN: denies any unusual skin lesions  EYES: denies blurred vision or double vision  HEENT: denies nasal congestion, sinus tablet by mouth daily. Bradycardia- needs 2 D Echo    No orders of the defined types were placed in this encounter.       Meds & Refills for this Visit:  Requested Prescriptions     Signed Prescriptions Disp Refills   • Magnesium 500 MG Oral Cap 90 capsu

## 2018-09-19 NOTE — TELEPHONE ENCOUNTER
Letter provided per Neurology and dtr picked up copy at neurologist's office on 9/14/18. Please see TE to neurologist on 9/13/18. No further action needed. Encounter closing.

## 2018-09-20 ENCOUNTER — TELEPHONE (OUTPATIENT)
Dept: FAMILY MEDICINE CLINIC | Facility: CLINIC | Age: 83
End: 2018-09-20

## 2018-09-20 ENCOUNTER — PATIENT MESSAGE (OUTPATIENT)
Dept: NEUROLOGY | Facility: CLINIC | Age: 83
End: 2018-09-20

## 2018-09-20 NOTE — TELEPHONE ENCOUNTER
From: Brigette Trujillo  To: Ananda Murdock MD  Sent: 9/20/2018 10:44 AM CDT  Subject: Visit Follow-up Question    Dear Dr. Gerhardt Bal September 19 I went for my sleep study. I look forward to discussing the results with you.     Thank you,  Eric Serrato

## 2018-09-20 NOTE — TELEPHONE ENCOUNTER
Marie Rousseau called in from cardiology to inform TSB that pt's diagnoses code for his Echo Card order is not passing.   Please advise

## 2018-09-24 ENCOUNTER — TELEPHONE (OUTPATIENT)
Dept: FAMILY MEDICINE CLINIC | Facility: CLINIC | Age: 83
End: 2018-09-24

## 2018-09-24 NOTE — TELEPHONE ENCOUNTER
Per pharmacy fax current dosage not available asking if can switch to magnesium 400mg     Current Outpatient Medications:   •  Magnesium 500 MG Oral Cap, Take 1 tablet by mouth daily. , Disp: 90 capsule, Rfl: 1

## 2018-09-24 NOTE — TELEPHONE ENCOUNTER
LM to [facility] central scheduling- TSB changed dx, advised to call back if still not passing medicare

## 2018-09-24 NOTE — TELEPHONE ENCOUNTER
Pharmacy contacted, 500 mg is no longer available. Pharmacist, Kelsey Cordon will order tablets instead of capsule. He will be able to pick it up tomorrow.

## 2018-09-26 NOTE — PROCEDURES
[facility]  Accredited by the [place] of Sleep Medicine (AASM)    PATIENT'S NAME: Samantha Goldberg   ATTENDING PHYSICIAN: Jorge Campbell MD   REFERRING PHYSICIAN: Jorge Campbell MD   PATIENT ACCOUNT #: [de-identified] LOCATION: Sleep Cent index was 12.6 events per hour and the spontaneous arousal index was 5.8 events per hour for a combined arousal index of 22.5 events per hour.   There were 133 periodic limb movements for a periodic limb movement index of 28.8 events per hour, of which 4 pe

## 2018-09-27 ENCOUNTER — PATIENT MESSAGE (OUTPATIENT)
Dept: NEUROLOGY | Facility: CLINIC | Age: 83
End: 2018-09-27

## 2018-09-27 NOTE — TELEPHONE ENCOUNTER
From: Da See  To: Dayday Rocha MD  Sent: 9/27/2018 6:33 PM CDT  Subject: Other    I was advised that an analysis of the sleep study performed on 9/19/18 at the [facility], [place] [address]  [address] [place] [address], [address] will be sent to you by 10

## 2018-09-29 ENCOUNTER — PATIENT MESSAGE (OUTPATIENT)
Dept: NEUROLOGY | Facility: CLINIC | Age: 83
End: 2018-09-29

## 2018-10-07 DIAGNOSIS — G40.909 SEIZURE DISORDER (HCC): ICD-10-CM

## 2018-10-08 NOTE — TELEPHONE ENCOUNTER
Refill request received by hosptialist team  Unable to approve refills, request routed to PCP and staff

## 2018-10-09 RX ORDER — LEVETIRACETAM 1000 MG/1
1000 TABLET ORAL 2 TIMES DAILY
Qty: 60 TABLET | Refills: 0 | Status: SHIPPED | OUTPATIENT
Start: 2018-10-09 | End: 2019-05-04

## 2018-10-09 RX ORDER — DIVALPROEX SODIUM 500 MG/1
TABLET, EXTENDED RELEASE ORAL
Qty: 60 TABLET | Refills: 0 | Status: SHIPPED | OUTPATIENT
Start: 2018-10-09 | End: 2019-05-04

## 2018-10-09 RX ORDER — LEVETIRACETAM 1000 MG/1
TABLET ORAL
Qty: 60 TABLET | Refills: 0 | Status: SHIPPED | OUTPATIENT
Start: 2018-10-09 | End: 2018-10-09

## 2018-10-09 NOTE — TELEPHONE ENCOUNTER
Pt states he has the appt in two weeks with the numerology. Asking if Dr can pls fill till he says the specialist. pls advise.  Thank you

## 2018-10-17 ENCOUNTER — HOSPITAL ENCOUNTER (OUTPATIENT)
Dept: ULTRASOUND IMAGING | Facility: HOSPITAL | Age: 83
Discharge: HOME | End: 2018-10-17
Attending: FAMILY MEDICINE
Payer: MEDICARE

## 2018-10-17 ENCOUNTER — HOSPITAL ENCOUNTER (OUTPATIENT)
Dept: CV DIAGNOSTICS | Facility: HOSPITAL | Age: 83
Discharge: HOME | End: 2018-10-17
Attending: FAMILY MEDICINE
Payer: MEDICARE

## 2018-10-17 DIAGNOSIS — R42 DIZZINESS: ICD-10-CM

## 2018-10-17 DIAGNOSIS — R94.31 ABNORMAL ELECTROCARDIOGRAM (ECG) (EKG): ICD-10-CM

## 2018-10-17 PROCEDURE — 93880 EXTRACRANIAL BILAT STUDY: CPT | Performed by: FAMILY MEDICINE

## 2018-10-17 PROCEDURE — 93306 TTE W/DOPPLER COMPLETE: CPT | Performed by: FAMILY MEDICINE

## 2018-10-24 ENCOUNTER — LAB ENCOUNTER (OUTPATIENT)
Dept: LAB | Facility: HOSPITAL | Age: 83
End: 2018-10-24
Attending: Other
Payer: MEDICARE

## 2018-10-24 ENCOUNTER — PRIOR ORIGINAL RECORDS (OUTPATIENT)
Dept: OTHER | Age: 83
End: 2018-10-24

## 2018-10-24 ENCOUNTER — OFFICE VISIT (OUTPATIENT)
Dept: NEUROLOGY | Facility: CLINIC | Age: 83
End: 2018-10-24
Payer: MEDICARE

## 2018-10-24 VITALS
HEIGHT: 69 IN | WEIGHT: 225 LBS | BODY MASS INDEX: 33.33 KG/M2 | SYSTOLIC BLOOD PRESSURE: 130 MMHG | HEART RATE: 80 BPM | DIASTOLIC BLOOD PRESSURE: 70 MMHG

## 2018-10-24 DIAGNOSIS — Z86.73 HISTORY OF TIA (TRANSIENT ISCHEMIC ATTACK): ICD-10-CM

## 2018-10-24 DIAGNOSIS — G40.909 SEIZURE DISORDER (HCC): ICD-10-CM

## 2018-10-24 DIAGNOSIS — G40.909 SEIZURE DISORDER (HCC): Primary | ICD-10-CM

## 2018-10-24 DIAGNOSIS — G47.9 SLEEP DISORDER: ICD-10-CM

## 2018-10-24 PROCEDURE — 85025 COMPLETE CBC W/AUTO DIFF WBC: CPT

## 2018-10-24 PROCEDURE — 99214 OFFICE O/P EST MOD 30 MIN: CPT | Performed by: OTHER

## 2018-10-24 PROCEDURE — 36415 COLL VENOUS BLD VENIPUNCTURE: CPT

## 2018-10-24 PROCEDURE — 80053 COMPREHEN METABOLIC PANEL: CPT

## 2018-10-24 PROCEDURE — 80164 ASSAY DIPROPYLACETIC ACD TOT: CPT | Performed by: OTHER

## 2018-10-24 RX ORDER — HYDROCHLOROTHIAZIDE 25 MG/1
25 TABLET ORAL DAILY
COMMUNITY
End: 2019-06-07 | Stop reason: ALTCHOICE

## 2018-10-27 NOTE — PROGRESS NOTES
[facility]    Daiana Salinas is a 80year old male. HPI:     Patient is being seen in follow-up. He was previously followed as an outpatient by Dr. Bubba Harrison, last seen by him in the office in September 2017.   He is accompani History: reviewed    Social History: reviewed    Medications (Active prior to today's visit):    Current Outpatient Medications:  hydrochlorothiazide 25 MG Oral Tab Take 25 mg by mouth daily.  Disp:  Rfl:    DIVALPROEX SODIUM  MG Oral Tablet 24 Hr [facility]  EEG 9/10/18  IMPRESSION:     This is an abnormal EEG. Electrographic seizure was seen in the left temporal lobe. 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 reviewed    –Patient and family advised to continue to keep track of seizure episodes    –We will check CBC, CMP, Depakote level    –Continue Keppra 1000 mg twice daily, Depakote 1000 mg daily    –We will potentially make antiepileptic medication adjustmen

## 2018-10-28 ENCOUNTER — PATIENT MESSAGE (OUTPATIENT)
Dept: NEUROLOGY | Facility: CLINIC | Age: 83
End: 2018-10-28

## 2018-10-29 ENCOUNTER — PATIENT MESSAGE (OUTPATIENT)
Dept: NEUROLOGY | Facility: CLINIC | Age: 83
End: 2018-10-29

## 2018-10-29 DIAGNOSIS — G40.909 SEIZURE DISORDER (HCC): Primary | ICD-10-CM

## 2018-10-29 NOTE — TELEPHONE ENCOUNTER
From: Romero Murillo MD  To: Epifanio Waters  Sent: 10/28/2018 11:48 AM CDT  Subject: Blood work results    Dear Ian Fragoso,    I wanted to let you know that the results of your recent blood work showed that your Depakote (valproic acid) level was just below the

## 2018-10-29 NOTE — TELEPHONE ENCOUNTER
From: Sonja Casiano  To: Jeremie Millard MD  Sent: 10/29/2018 5:41 PM CDT  Subject: Prescription Question    My pharmacy is [address]   Carmen Pillai

## 2018-11-08 ENCOUNTER — TELEPHONE (OUTPATIENT)
Dept: OTHER | Age: 83
End: 2018-11-08

## 2018-11-08 NOTE — TELEPHONE ENCOUNTER
Dr. Jessica Capellan: please note, patient states he does not want Cpap titration done. He states he \"Just had sleep test 9/19/18\". I explained his sleep study report and informed he has moderate obstructive sleep apnea.  Explained he is not getting enough oxy

## 2018-11-12 LAB
BUN: 17 MG/DL
CALCIUM: 9 MG/DL
CHLORIDE: 102 MEQ/L
CHOLESTEROL, TOTAL: 171 MG/DL
CREATININE, SERUM: 1.3 MG/DL
GLUCOSE: 100 MG/DL
HDL CHOLESTEROL: 33 MG/DL
HEMATOCRIT: 45.5 %
HEMOGLOBIN: 15.5 G/DL
LDL CHOLESTEROL: 121 MG/DL
MAGNESIUM: 1.7 MG/DL
NON-HDL CHOLESTEROL: 138 MG/DL
PLATELETS: 174 K/UL
POTASSIUM, SERUM: 3.7 MEQ/L
PROTEIN, TOTAL: 5.7 G/DL
RED BLOOD COUNT: 5.1 X 10-6/U
SGOT (AST): 35 IU/L
SGPT (ALT): 42 IU/L
SODIUM: 139 MEQ/L
TRIGLYCERIDES: 85 MG/DL
WHITE BLOOD COUNT: 6.3 X 10-3/U

## 2018-11-16 ENCOUNTER — MYAURORA ACCOUNT LINK (OUTPATIENT)
Dept: OTHER | Age: 83
End: 2018-11-16

## 2018-11-16 ENCOUNTER — PRIOR ORIGINAL RECORDS (OUTPATIENT)
Dept: OTHER | Age: 83
End: 2018-11-16

## 2018-11-18 RX ORDER — LISINOPRIL AND HYDROCHLOROTHIAZIDE 25; 20 MG/1; MG/1
TABLET ORAL
Qty: 90 TABLET | Refills: 0 | Status: SHIPPED | OUTPATIENT
Start: 2018-11-18 | End: 2019-02-18

## 2018-11-28 ENCOUNTER — PRIOR ORIGINAL RECORDS (OUTPATIENT)
Dept: OTHER | Age: 83
End: 2018-11-28

## 2018-11-30 ENCOUNTER — MYAURORA ACCOUNT LINK (OUTPATIENT)
Dept: OTHER | Age: 83
End: 2018-11-30

## 2018-11-30 ENCOUNTER — PRIOR ORIGINAL RECORDS (OUTPATIENT)
Dept: OTHER | Age: 83
End: 2018-11-30

## 2018-12-03 NOTE — TELEPHONE ENCOUNTER
Medication request: levetiracetam 1000mg. Take 1 tablet BID. L#60. No refills.      LOV-10/24/2018  NOV-1/30/2019    [place]10[place]9/2018

## 2018-12-04 RX ORDER — LEVETIRACETAM 750 MG/1
TABLET ORAL
Qty: 180 TABLET | Refills: 0 | Status: SHIPPED | OUTPATIENT
Start: 2018-12-04 | End: 2019-04-02

## 2018-12-04 NOTE — TELEPHONE ENCOUNTER
Pt called questioning why his Keppra dosage changed back to 750mg and why there is a refill at the pharmacy. Medication request was for 1000mg, but 750mg was pended/signed. Per LOV plan 10/24/18, Pt is to take Keppra 1000mg BID.      Confirmed with pt that

## 2018-12-28 ENCOUNTER — PRIOR ORIGINAL RECORDS (OUTPATIENT)
Dept: OTHER | Age: 83
End: 2018-12-28

## 2018-12-28 ENCOUNTER — MYAURORA ACCOUNT LINK (OUTPATIENT)
Dept: OTHER | Age: 83
End: 2018-12-28

## 2019-02-18 RX ORDER — LISINOPRIL AND HYDROCHLOROTHIAZIDE 25; 20 MG/1; MG/1
TABLET ORAL
Qty: 90 TABLET | Refills: 0 | Status: SHIPPED | OUTPATIENT
Start: 2019-02-18 | End: 2019-05-12

## 2019-02-24 NOTE — TELEPHONE ENCOUNTER
From: Lyndsey Olguin  To: Ashley Kimble MD  Sent: 9/29/2018 3:08 PM CDT  Subject: Other    I read the sleep study results and would suggest that Dr. Erika Pantoja give you the times at which the three seizure activities occurred.  Although I was supposed to take
Neuropathy

## 2019-02-25 ENCOUNTER — TELEPHONE (OUTPATIENT)
Dept: NEUROLOGY | Facility: CLINIC | Age: 84
End: 2019-02-25

## 2019-02-25 NOTE — TELEPHONE ENCOUNTER
Driving forms completed and waiting for [facility] on 4/2/2019 after evaluation for  to sign. Placed in nurses bin.

## 2019-02-28 VITALS
WEIGHT: 228 LBS | SYSTOLIC BLOOD PRESSURE: 128 MMHG | BODY MASS INDEX: 33.77 KG/M2 | HEART RATE: 66 BPM | DIASTOLIC BLOOD PRESSURE: 66 MMHG | HEIGHT: 69 IN

## 2019-02-28 VITALS
WEIGHT: 226 LBS | BODY MASS INDEX: 33.47 KG/M2 | HEIGHT: 69 IN | DIASTOLIC BLOOD PRESSURE: 60 MMHG | HEART RATE: 60 BPM | OXYGEN SATURATION: 97 % | RESPIRATION RATE: 20 BRPM | SYSTOLIC BLOOD PRESSURE: 126 MMHG

## 2019-02-28 VITALS
HEIGHT: 69 IN | BODY MASS INDEX: 33.77 KG/M2 | WEIGHT: 228 LBS | HEART RATE: 66 BPM | SYSTOLIC BLOOD PRESSURE: 122 MMHG | DIASTOLIC BLOOD PRESSURE: 76 MMHG

## 2019-04-02 ENCOUNTER — TELEPHONE (OUTPATIENT)
Dept: NEUROLOGY | Facility: CLINIC | Age: 84
End: 2019-04-02

## 2019-04-02 ENCOUNTER — OFFICE VISIT (OUTPATIENT)
Dept: NEUROLOGY | Facility: CLINIC | Age: 84
End: 2019-04-02
Payer: MEDICARE

## 2019-04-02 VITALS
SYSTOLIC BLOOD PRESSURE: 116 MMHG | BODY MASS INDEX: 33.33 KG/M2 | HEART RATE: 66 BPM | DIASTOLIC BLOOD PRESSURE: 77 MMHG | WEIGHT: 225 LBS | HEIGHT: 69 IN | RESPIRATION RATE: 16 BRPM

## 2019-04-02 DIAGNOSIS — G40.909 SEIZURE DISORDER (HCC): Primary | ICD-10-CM

## 2019-04-02 DIAGNOSIS — G47.9 SLEEP DISORDER: ICD-10-CM

## 2019-04-02 DIAGNOSIS — R25.1 TREMOR: ICD-10-CM

## 2019-04-02 DIAGNOSIS — Z86.73 HISTORY OF TIA (TRANSIENT ISCHEMIC ATTACK): ICD-10-CM

## 2019-04-02 PROCEDURE — 99213 OFFICE O/P EST LOW 20 MIN: CPT | Performed by: OTHER

## 2019-04-02 NOTE — PROGRESS NOTES
[facility]    Liza Das is a 80year old male. HPI:     Patient is being seen in follow-up. Is accompanied by his 2 daughters to the visit today. I saw him in clinic last in October 2018.     Since last visit, he has no regular rate and rhythm   Lungs: clear to auscultation bilaterally  Neuro:  Mental Status: alert, speech fluent and appropriate       Cranial Nerves: pupils equal, round, and reactive to light; extraocular movements intact; face symmetric, no dysarthria or

## 2019-04-02 NOTE — TELEPHONE ENCOUNTER
L/m on Vashti's v/m advising Driving evaluation consists of 2 parts. !st part  Is covered under Medicare. 2nd part is Pt's responsibility. Left detailed information so she can call to schedule appt.

## 2019-04-03 ENCOUNTER — TELEPHONE (OUTPATIENT)
Dept: NEUROLOGY | Facility: CLINIC | Age: 84
End: 2019-04-03

## 2019-04-03 RX ORDER — LISINOPRIL AND HYDROCHLOROTHIAZIDE 25; 20 MG/1; MG/1
TABLET ORAL
COMMUNITY

## 2019-04-03 RX ORDER — LEVETIRACETAM 1000 MG/1
TABLET ORAL
COMMUNITY

## 2019-04-03 RX ORDER — DIVALPROEX SODIUM 500 MG/1
TABLET, EXTENDED RELEASE ORAL
COMMUNITY

## 2019-04-03 NOTE — TELEPHONE ENCOUNTER
Spoke to patient who request we fax order for driving evaluation be faxed to Razia Lemos at [phone].  Xcyvzioqs

## 2019-04-04 ENCOUNTER — PATIENT MESSAGE (OUTPATIENT)
Dept: NEUROLOGY | Facility: CLINIC | Age: 84
End: 2019-04-04

## 2019-04-04 NOTE — TELEPHONE ENCOUNTER
From: Checo Swanson  To:  Darrion Mejia MD  Sent: 4/4/2019 9:37 AM CDT  Subject: Visit Follow-up Question    Dr. Chan Kin:   Suzie Holman gave me an order for the [facility]. I talked to them and found that it is basically meant to prepare ph

## 2019-04-07 ENCOUNTER — PATIENT MESSAGE (OUTPATIENT)
Dept: NEUROLOGY | Facility: CLINIC | Age: 84
End: 2019-04-07

## 2019-04-08 NOTE — TELEPHONE ENCOUNTER
From: Sonja Casiano  To:  Jeremie Millard MD  Sent: 4/7/2019 2:16 PM CDT  Subject: Referral Request    Dr. Lawson Self:  I appreciate that you are concerned about my overall ability to drive safely, but I still contend this type of program does not address my

## 2019-04-09 ENCOUNTER — TELEPHONE (OUTPATIENT)
Dept: NEUROLOGY | Facility: CLINIC | Age: 84
End: 2019-04-09

## 2019-04-09 NOTE — TELEPHONE ENCOUNTER
Reached out to daughter Hemalatha Sotelo to obtain some collateral info re: patient's driving, LMTCB.

## 2019-04-09 NOTE — TELEPHONE ENCOUNTER
Regarding: Referral Request  Contact: [phone]  ----- Message from Everett Franco MA sent at 4/8/2019  8:09 AM CDT -----       ----- Message from Randall Spangler to Trey Kayser, MD sent at 4/7/2019  2:16 PM -----   Dr. Doug Sanchez:  I appreciate that you

## 2019-04-09 NOTE — TELEPHONE ENCOUNTER
Spoke at length with daughter Francesca Preciado regarding patient's driving. She would like to discuss the topic further with her sister, and get back to me.

## 2019-04-10 ENCOUNTER — TELEPHONE (OUTPATIENT)
Dept: NEUROLOGY | Facility: CLINIC | Age: 84
End: 2019-04-10

## 2019-04-10 NOTE — TELEPHONE ENCOUNTER
S/w Nikolai Greenberg (daughter) who states she was waiting for Dr. Harris Deep return call when she saw the Douguo message that we mailed the [facility] form to the patient.  Informed Nikolai Greenberg that patient needs a  License to complete recommended driving evaluation and s

## 2019-05-04 DIAGNOSIS — G40.909 SEIZURE DISORDER (HCC): ICD-10-CM

## 2019-05-06 RX ORDER — DIVALPROEX SODIUM 500 MG/1
TABLET, EXTENDED RELEASE ORAL
Qty: 90 TABLET | Refills: 1 | Status: SHIPPED | OUTPATIENT
Start: 2019-05-06 | End: 2019-07-20

## 2019-05-06 RX ORDER — LEVETIRACETAM 1000 MG/1
TABLET ORAL
Qty: 180 TABLET | Refills: 1 | Status: SHIPPED | OUTPATIENT
Start: 2019-05-06 | End: 2019-10-17

## 2019-05-06 NOTE — TELEPHONE ENCOUNTER
Medication request: Divalproex sodium  MG Oral Tab 24 Hr, #90, no refills  Take one tablet by mouth one time daily      Medication request: levetiracetam 1000 MG Oral Tab, #180, no refills  Take 1 tablet (1000mg total) by mouth 2 (two) times daily.

## 2019-05-12 RX ORDER — LISINOPRIL AND HYDROCHLOROTHIAZIDE 25; 20 MG/1; MG/1
TABLET ORAL
Qty: 90 TABLET | Refills: 0 | Status: SHIPPED | OUTPATIENT
Start: 2019-05-12 | End: 2019-07-20

## 2019-05-14 RX ORDER — TAMSULOSIN HYDROCHLORIDE 0.4 MG/1
CAPSULE ORAL
Qty: 90 CAPSULE | Refills: 3 | OUTPATIENT
Start: 2019-05-14

## 2019-05-16 ENCOUNTER — TELEPHONE (OUTPATIENT)
Dept: SURGERY | Facility: CLINIC | Age: 84
End: 2019-05-16

## 2019-05-16 RX ORDER — TAMSULOSIN HYDROCHLORIDE 0.4 MG/1
CAPSULE ORAL
Qty: 90 CAPSULE | Refills: 3 | OUTPATIENT
Start: 2019-05-16

## 2019-05-17 RX ORDER — TAMSULOSIN HYDROCHLORIDE 0.4 MG/1
0.4 CAPSULE ORAL DAILY
Qty: 90 CAPSULE | Refills: 0 | Status: SHIPPED | OUTPATIENT
Start: 2019-05-17 | End: 2019-06-25

## 2019-05-17 RX ORDER — TAMSULOSIN HYDROCHLORIDE 0.4 MG/1
CAPSULE ORAL
Qty: 90 CAPSULE | Refills: 3 | OUTPATIENT
Start: 2019-05-17

## 2019-05-17 NOTE — TELEPHONE ENCOUNTER
I called pt verified name and date of birth, I informed pt that his refill was denied because his LOV with Dr. Meeks Beth 4/2018 and pt needs to be seen yearly. Pt scheduled maryellen next bartolome office visit with SONAL at the end of June 2019 and he is asking for a refill until then.  Please advise

## 2019-05-23 ENCOUNTER — APPOINTMENT (OUTPATIENT)
Dept: LAB | Facility: HOSPITAL | Age: 84
End: 2019-05-23
Attending: UROLOGY
Payer: MEDICARE

## 2019-05-23 ENCOUNTER — TELEPHONE (OUTPATIENT)
Dept: SURGERY | Facility: CLINIC | Age: 84
End: 2019-05-23

## 2019-05-23 DIAGNOSIS — R35.1 NOCTURIA: ICD-10-CM

## 2019-05-23 DIAGNOSIS — C61 PROSTATE CANCER (HCC): ICD-10-CM

## 2019-05-23 DIAGNOSIS — R35.1 NOCTURIA: Primary | ICD-10-CM

## 2019-05-23 PROCEDURE — 36415 COLL VENOUS BLD VENIPUNCTURE: CPT

## 2019-05-23 PROCEDURE — 81001 URINALYSIS AUTO W/SCOPE: CPT

## 2019-05-23 PROCEDURE — 84153 ASSAY OF PSA TOTAL: CPT

## 2019-05-23 NOTE — TELEPHONE ENCOUNTER
Received call from [facility] from patient registration. States patient's yearly labs have . Labs for repeat UA and PSA reordered.      Future Appointments   Date Time Provider Haydee Mario   2019  4:00 PM Malcom aRmirez MD [facility]

## 2019-05-27 ENCOUNTER — PATIENT MESSAGE (OUTPATIENT)
Dept: NEUROLOGY | Facility: CLINIC | Age: 84
End: 2019-05-27

## 2019-05-28 NOTE — TELEPHONE ENCOUNTER
From: David Norris  To: Trey Kayser, MD  Sent: 5/27/2019 8:03 AM CDT  Subject: Other    Dr. Doug Sanchez:  Talisha Keely should have received the  evaluation report from [facility] indicating that I am a safe .  I have also taken the DMV testing to

## 2019-05-29 ENCOUNTER — MED REC SCAN ONLY (OUTPATIENT)
Dept: NEUROLOGY | Facility: CLINIC | Age: 84
End: 2019-05-29

## 2019-06-07 ENCOUNTER — PATIENT MESSAGE (OUTPATIENT)
Dept: FAMILY MEDICINE CLINIC | Facility: CLINIC | Age: 84
End: 2019-06-07

## 2019-06-07 ENCOUNTER — OFFICE VISIT (OUTPATIENT)
Dept: FAMILY MEDICINE CLINIC | Facility: CLINIC | Age: 84
End: 2019-06-07
Payer: MEDICARE

## 2019-06-07 ENCOUNTER — LAB ENCOUNTER (OUTPATIENT)
Dept: LAB | Age: 84
End: 2019-06-07
Attending: FAMILY MEDICINE
Payer: MEDICARE

## 2019-06-07 VITALS
SYSTOLIC BLOOD PRESSURE: 128 MMHG | HEART RATE: 52 BPM | BODY MASS INDEX: 33.92 KG/M2 | TEMPERATURE: 98 F | DIASTOLIC BLOOD PRESSURE: 68 MMHG | HEIGHT: 69 IN | WEIGHT: 229 LBS

## 2019-06-07 DIAGNOSIS — I10 ESSENTIAL HYPERTENSION: ICD-10-CM

## 2019-06-07 DIAGNOSIS — H90.3 BILATERAL SENSORINEURAL HEARING LOSS: ICD-10-CM

## 2019-06-07 DIAGNOSIS — R56.9 SEIZURE (HCC): ICD-10-CM

## 2019-06-07 DIAGNOSIS — Z00.00 ENCOUNTER FOR ANNUAL HEALTH EXAMINATION: ICD-10-CM

## 2019-06-07 DIAGNOSIS — G40.909 SEIZURE DISORDER (HCC): ICD-10-CM

## 2019-06-07 DIAGNOSIS — R26.89 BALANCE DISORDER: ICD-10-CM

## 2019-06-07 DIAGNOSIS — Z96.1 PSEUDOPHAKIA OF BOTH EYES: ICD-10-CM

## 2019-06-07 DIAGNOSIS — C61 PROSTATE CANCER (HCC): Primary | ICD-10-CM

## 2019-06-07 PROCEDURE — 90670 PCV13 VACCINE IM: CPT | Performed by: FAMILY MEDICINE

## 2019-06-07 PROCEDURE — G0009 ADMIN PNEUMOCOCCAL VACCINE: HCPCS | Performed by: FAMILY MEDICINE

## 2019-06-07 PROCEDURE — 80053 COMPREHEN METABOLIC PANEL: CPT

## 2019-06-07 PROCEDURE — 80164 ASSAY DIPROPYLACETIC ACD TOT: CPT

## 2019-06-07 PROCEDURE — 83735 ASSAY OF MAGNESIUM: CPT

## 2019-06-07 PROCEDURE — 36415 COLL VENOUS BLD VENIPUNCTURE: CPT

## 2019-06-07 PROCEDURE — G0463 HOSPITAL OUTPT CLINIC VISIT: HCPCS | Performed by: FAMILY MEDICINE

## 2019-06-07 PROCEDURE — 85025 COMPLETE CBC W/AUTO DIFF WBC: CPT

## 2019-06-07 PROCEDURE — G0439 PPPS, SUBSEQ VISIT: HCPCS | Performed by: FAMILY MEDICINE

## 2019-06-07 PROCEDURE — 84443 ASSAY THYROID STIM HORMONE: CPT

## 2019-06-07 NOTE — TELEPHONE ENCOUNTER
From: Saundra Ortiz  To: Annelise Gonsalez MD  Sent: 6/7/2019 3:59 PM CDT  Subject: Prescription Question    Dr. Macias Draft:  I forgot to ask you today if I should continue to take a 81 mg aspirin daily?  There was a news article by a health organization recentl

## 2019-06-17 NOTE — PROGRESS NOTES
HPI:   Mari Stark is a 80year old male who presents for a Medicare Subsequent Annual Wellness visit (Pt already had Initial Annual Wellness).       His last annual assessment has been over 1 year: Annual Physical due on 05/04/2019         Fall/Risk As Consulting Physician (NEUROLOGY)    Patient Active Problem List:     Pseudophakia of both eyes     RPE mottling of macula left eye     Dermatochalasis of eyelid     Vitreous floaters of right eye     Seizure ([address]     Prostate cancer ([address]     Hypertension appendectomy; cholecystectomy; vasectomy (Left, 1997 / 2010); colonoscopy; other surgical history (Right, 2014); Cataract extraction w/  intraocular lens implant (Right, 2/17/14);  Cataract extraction w/  intraocular lens implant (Left, 5/3/10); vitrectomy, have to strain to understand conversations:  Sometimes   I have to worry about missing the telephone ring or doorbell:  No I have trouble hearing conversations in a noisy background such as a crowded room or restaurant:  Yes   I get confused about where so Heart:  Regular rate and rhythm, S1, S2 normal, no murmur, rub or gallop   Abdomen:   Soft, non-tender, bowel sounds active all four quadrants,  no masses, no organomegaly   Genitalia: Normal male   Rectal: Normal tone, normal prostate, no masses or tend current health state?: (P) Good  How do you maintain positive mental well-being?: (P) Social Interaction; Visiting Family    This section provided for quick review of chart, separate sheet to patient  [address] Internal retarded   Persons who live in the same house as a HepB virus carrier   Homosexual men   Illicit injectable drug abusers     Tetanus Toxoid  Only covered with a cut with metal- TD and TDaP Not covered by Medicare Part B) No vaccine history found This may b

## 2019-06-17 NOTE — PATIENT INSTRUCTIONS
[address] SCHEDULE   Tests on this list are recommended by your physician but may not be covered, or covered at this frequency, by your insurer. Please check with your insurance carrier before scheduling to verify coverage.     PREVENTYANET Colorectal Cancer Screening Covered up to Age 76     Colonoscopy Screen   Covered every 10 years- more often if abnormal There are no preventive care reminders to display for this patient.  Update [employer] if applicable    Flex Sigmoidoscopy Scr TDaP Not covered by Medicare Part B) No orders found for this or any previous visit.  This may be covered with your prescription benefits, but Medicare does not cover unless Medically needed    Zoster (Not covered by Medicare Part B) No orders found for [facility]

## 2019-06-25 ENCOUNTER — OFFICE VISIT (OUTPATIENT)
Dept: SURGERY | Facility: CLINIC | Age: 84
End: 2019-06-25
Payer: MEDICARE

## 2019-06-25 VITALS
BODY MASS INDEX: 33 KG/M2 | HEART RATE: 73 BPM | WEIGHT: 225 LBS | DIASTOLIC BLOOD PRESSURE: 76 MMHG | SYSTOLIC BLOOD PRESSURE: 116 MMHG | TEMPERATURE: 98 F

## 2019-06-25 DIAGNOSIS — N28.1 KIDNEY CYST, ACQUIRED: ICD-10-CM

## 2019-06-25 DIAGNOSIS — R80.1 PERSISTENT PROTEINURIA: ICD-10-CM

## 2019-06-25 DIAGNOSIS — C61 PROSTATE CANCER (HCC): Primary | ICD-10-CM

## 2019-06-25 DIAGNOSIS — R35.1 NOCTURIA: ICD-10-CM

## 2019-06-25 DIAGNOSIS — N19 RENAL FAILURE, UNSPECIFIED CHRONICITY: ICD-10-CM

## 2019-06-25 DIAGNOSIS — Z79.82 LONG TERM CURRENT USE OF ASPIRIN: ICD-10-CM

## 2019-06-25 PROCEDURE — 99214 OFFICE O/P EST MOD 30 MIN: CPT | Performed by: UROLOGY

## 2019-06-25 PROCEDURE — G0463 HOSPITAL OUTPT CLINIC VISIT: HCPCS | Performed by: UROLOGY

## 2019-06-25 RX ORDER — TAMSULOSIN HYDROCHLORIDE 0.4 MG/1
0.4 CAPSULE ORAL DAILY
Qty: 90 CAPSULE | Refills: 3 | Status: SHIPPED | OUTPATIENT
Start: 2019-06-25 | End: 2020-08-14

## 2019-06-25 NOTE — PROGRESS NOTES
HPI:    Patient ID: Chacha Ervin is a 80year old male. HPI   Prostate cancer  Chronic. Problem started 1/29/2011; pathology showed stage T1c, Fraser 3+3 adenocarcinoma of the prostate; low volume.  1/16/2012 Gold Marker Placement; 3 markers were marcus the prostate biopsy, small volume of cancer found in the contralateral right base;  On KUSUM, > 50 gram prostate with difficulty reaching the base of the gland October 3, 2011 visit; 5 x 4 x 2 cm;  Ultrasound volume of prostate 68.9 grams, office November 9, LISINOPRIL-HYDROCHLOROTHIAZIDE 20-25 MG Oral Tab TAKE ONE TABLET BY MOUTH ONE TIME DAILY  Disp: 90 tablet Rfl: 0   LEVETIRACETAM 1000 MG Oral Tab TAKE ONE TABLET BY MOUTH TWICE DAILY  Disp: 180 tablet Rfl: 1   DIVALPROEX SODIUM  MG Oral Tablet 24 H urinate again less than 2 hours after you finished urinating?: Less than half the time  Over the past month, how often have you found that you stopped and started again several times when you urinated?: Less than 1 time in 5  Over the past month, how often \"microscopic not indicated\"  4/20/2017 PSA = 0.1; Creatinine = 1.35; eGFR = 50  5/3/2016 PSA = 0.1  5/8/2015 PSA = 0.1      UROLOGICAL IMAGING   10/2/17 ultrasound of kidney and bladder--multiple bilateral simple renal cysts; no evidence of hydronephrosi 30--stable compared to 9/10/18 UA. Patient feels this is stable and decides to observe.  He decides to follow up in 1 year with UA before the visit.    (N28.1) Kidney cyst, acquired  10/2/17 kidney ultrasound shows multiple bilateral simple renal cysts with medical record entries made by the scribe were at my direction and in my presence. I have reviewed the chart and discharge instructions (if applicable) and agree that the record reflects my personal performance and is accurate and complete.   Bharat Powell

## 2019-06-25 NOTE — PATIENT INSTRUCTIONS
My Guo M.D.      1.  Continue tamsulosin 0.4 mg daily    2. Return visit in 1 year. Please get blood draw for PSA and urinalysis urine test 1--10 days before visit.

## 2019-06-26 ENCOUNTER — OFFICE VISIT (OUTPATIENT)
Dept: PODIATRY CLINIC | Facility: CLINIC | Age: 84
End: 2019-06-26
Payer: MEDICARE

## 2019-06-26 DIAGNOSIS — M79.675 PAIN IN TOES OF BOTH FEET: Primary | ICD-10-CM

## 2019-06-26 DIAGNOSIS — M79.674 PAIN IN TOES OF BOTH FEET: Primary | ICD-10-CM

## 2019-06-26 DIAGNOSIS — B35.1 ONYCHOMYCOSIS: ICD-10-CM

## 2019-06-26 PROCEDURE — 11721 DEBRIDE NAIL 6 OR MORE: CPT | Performed by: PODIATRIST

## 2019-06-26 NOTE — PROGRESS NOTES
HPI:    Patient ID: Charmain Prader is a 80year old male. This 12-year-old male presents with recurrent pain associated with his toenails. I have not seen this patient in over one year.       ROS:     I did review medical status medications were noted an

## 2019-07-02 ENCOUNTER — TELEPHONE (OUTPATIENT)
Dept: CARDIOLOGY | Age: 84
End: 2019-07-02

## 2019-07-02 DIAGNOSIS — R53.83 FATIGUE, UNSPECIFIED TYPE: Primary | ICD-10-CM

## 2019-07-02 DIAGNOSIS — I10 HYPERTENSION, UNSPECIFIED TYPE: ICD-10-CM

## 2019-07-02 DIAGNOSIS — R94.31 ABNORMAL ELECTROCARDIOGRAM: ICD-10-CM

## 2019-07-17 ENCOUNTER — ANCILLARY PROCEDURE (OUTPATIENT)
Dept: CARDIOLOGY | Age: 84
End: 2019-07-17
Attending: INTERNAL MEDICINE

## 2019-07-17 VITALS — BODY MASS INDEX: 33.77 KG/M2 | HEIGHT: 69 IN | WEIGHT: 228 LBS

## 2019-07-17 DIAGNOSIS — R53.83 FATIGUE, UNSPECIFIED TYPE: ICD-10-CM

## 2019-07-17 DIAGNOSIS — R94.31 ABNORMAL ELECTROCARDIOGRAM: ICD-10-CM

## 2019-07-17 DIAGNOSIS — I10 HYPERTENSION, UNSPECIFIED TYPE: ICD-10-CM

## 2019-07-17 LAB
LV EF: 62 %
RESTING HR ACHIEVED: 47 BPM
STRESS BASELINE BP: NORMAL MMHG
STRESS POST EXERCISE DUR MIN: 1 MIN
STRESS POST EXERCISE DUR SEC: 0 SEC
STRESS TARGET HR: 131 BPM

## 2019-07-17 PROCEDURE — 78452 HT MUSCLE IMAGE SPECT MULT: CPT | Performed by: INTERNAL MEDICINE

## 2019-07-17 PROCEDURE — A9502 TC99M TETROFOSMIN: HCPCS | Performed by: INTERNAL MEDICINE

## 2019-07-17 PROCEDURE — 93018 CV STRESS TEST I&R ONLY: CPT | Performed by: INTERNAL MEDICINE

## 2019-07-17 PROCEDURE — 93016 CV STRESS TEST SUPVJ ONLY: CPT | Performed by: INTERNAL MEDICINE

## 2019-07-17 PROCEDURE — 93017 CV STRESS TEST TRACING ONLY: CPT | Performed by: INTERNAL MEDICINE

## 2019-07-17 RX ORDER — REGADENOSON 0.08 MG/ML
0.4 INJECTION, SOLUTION INTRAVENOUS ONCE
Status: COMPLETED | OUTPATIENT
Start: 2019-07-17 | End: 2019-07-17

## 2019-07-17 RX ADMIN — REGADENOSON 0.4 MG: 0.08 INJECTION, SOLUTION INTRAVENOUS at 10:00

## 2019-07-17 ASSESSMENT — EXERCISE STRESS TEST
STAGE_CATEGORIES: RESTING
PEAK_HR: 65
PEAK_HR: 47
PEAK_BP: 118/52
STAGE_CATEGORIES: RECOVERY 0
PEAK_BP: 128/62
PEAK_RPP: 8360
COMMENTS: 30 SECOND RECOVERY
PEAK_BP: 120/60
STOPPAGE_REASON: PROTOCOL COMPLETE
PEAK_HR: 76
STAGE_CATEGORIES: RECOVERY 1
PEAK_RPP: 8320
STAGE_CATEGORIES: RECOVERY 2
STAGE_CATEGORIES: 1
COMMENTS: 2 MIN RECOVERY
PEAK_HR: 64
PEAK_BP: 110/47
PEAK_RPP: 7670
PEAK_HR: 64
PEAK_RPP: 5640
PEAK_BP: 130/60
PEAK_RPP: 8192

## 2019-07-20 DIAGNOSIS — G40.909 SEIZURE DISORDER (HCC): ICD-10-CM

## 2019-07-20 RX ORDER — LISINOPRIL AND HYDROCHLOROTHIAZIDE 25; 20 MG/1; MG/1
TABLET ORAL
Qty: 90 TABLET | Refills: 1 | Status: SHIPPED | OUTPATIENT
Start: 2019-07-20 | End: 2020-01-14

## 2019-07-22 RX ORDER — DIVALPROEX SODIUM 500 MG/1
TABLET, EXTENDED RELEASE ORAL
Qty: 180 TABLET | Refills: 1 | Status: SHIPPED | OUTPATIENT
Start: 2019-07-22 | End: 2020-01-13

## 2019-09-04 ENCOUNTER — OFFICE VISIT (OUTPATIENT)
Dept: PHYSICAL THERAPY | Facility: HOSPITAL | Age: 84
End: 2019-09-04
Attending: FAMILY MEDICINE
Payer: MEDICARE

## 2019-09-04 DIAGNOSIS — R26.89 BALANCE DISORDER: ICD-10-CM

## 2019-09-04 PROCEDURE — 97161 PT EVAL LOW COMPLEX 20 MIN: CPT

## 2019-09-04 NOTE — PROGRESS NOTES
NEUROLOGICAL PHYSICAL THERAPY EVALUATION:   Referring Physician: Dr. April Jenkins  Diagnosis: imbalance, difficulty walking      Date of Onset: per HPI Date of Service: 9/4/2019     PATIENT SUMMARY   Bernardino Park is a 80year old y/o male who presents to the Additional objective findings as documented below. Pt. would benefit from skilled Physical Therapy to address the above impairments to improve postural stability in order to reduce risk of falls.      Precautions: Fall Risk      OBJECTIVE:   Observation/P seconds with postural sway WNL in order to improve safety in vision eliminated/reduced situations such as washing hair in shower. 3. Pt will improve score on FGA to 22/30 to reflect a reduced risk of falling with performance of functional, gait tasks.

## 2019-09-09 ENCOUNTER — OFFICE VISIT (OUTPATIENT)
Dept: PHYSICAL THERAPY | Facility: HOSPITAL | Age: 84
End: 2019-09-09
Attending: FAMILY MEDICINE
Payer: MEDICARE

## 2019-09-09 PROCEDURE — 97112 NEUROMUSCULAR REEDUCATION: CPT

## 2019-09-09 PROCEDURE — 97110 THERAPEUTIC EXERCISES: CPT

## 2019-09-09 NOTE — PROGRESS NOTES
Diagnosis: imbalance, difficulty walking  Visit (# authorized):  8 per POC (Medicare)                        Referring Physician: Dr Kalen Leblanc    Precautions: at risk for falls, pt is hard of hearing    Medication changes since last visit?:  No    Subjective:

## 2019-09-11 ENCOUNTER — OFFICE VISIT (OUTPATIENT)
Dept: PHYSICAL THERAPY | Facility: HOSPITAL | Age: 84
End: 2019-09-11
Attending: FAMILY MEDICINE
Payer: MEDICARE

## 2019-09-11 PROCEDURE — 97112 NEUROMUSCULAR REEDUCATION: CPT

## 2019-09-11 PROCEDURE — 97110 THERAPEUTIC EXERCISES: CPT

## 2019-09-11 NOTE — PROGRESS NOTES
Diagnosis: imbalance, difficulty walking  Visit (# authorized):  8 per POC (Medicare)                        Referring Physician: Dr Vic May    Precautions: at risk for falls, pt is hard of hearing    Medication changes since last visit?:  No    Subjective:

## 2019-09-16 ENCOUNTER — OFFICE VISIT (OUTPATIENT)
Dept: PHYSICAL THERAPY | Facility: HOSPITAL | Age: 84
End: 2019-09-16
Attending: FAMILY MEDICINE
Payer: MEDICARE

## 2019-09-16 PROCEDURE — 97112 NEUROMUSCULAR REEDUCATION: CPT

## 2019-09-16 PROCEDURE — 97110 THERAPEUTIC EXERCISES: CPT

## 2019-09-16 NOTE — PROGRESS NOTES
Diagnosis: imbalance, difficulty walking  Visit (# authorized):  8 per POC (Medicare)                        Referring Physician: Dr Mitzi Blanton    Precautions: at risk for falls, pt is hard of hearing    Medication changes since last visit?:  No    Subjective:

## 2019-09-18 ENCOUNTER — OFFICE VISIT (OUTPATIENT)
Dept: PHYSICAL THERAPY | Facility: HOSPITAL | Age: 84
End: 2019-09-18
Attending: FAMILY MEDICINE
Payer: MEDICARE

## 2019-09-18 PROCEDURE — 97110 THERAPEUTIC EXERCISES: CPT

## 2019-09-18 PROCEDURE — 97112 NEUROMUSCULAR REEDUCATION: CPT

## 2019-09-18 NOTE — PATIENT INSTRUCTIONS
Walk for 20 minutes a day at least 3x per week. Try to move your head to look around     Do these exercises everyday. 1. Stand next to your bed for safety. Step one foot forward a small amount. Arms crossed, eyes closed.  Balance for 30 seconds, practi

## 2019-09-18 NOTE — PROGRESS NOTES
Diagnosis: imbalance, difficulty walking  Visit (# authorized):  8 per POC (Medicare)                        Referring Physician: Dr Reed Colón    Precautions: at risk for falls, pt is hard of hearing    Medication changes since last visit?:  No    Subjective:

## 2019-09-23 ENCOUNTER — OFFICE VISIT (OUTPATIENT)
Dept: PHYSICAL THERAPY | Facility: HOSPITAL | Age: 84
End: 2019-09-23
Attending: FAMILY MEDICINE
Payer: MEDICARE

## 2019-09-23 PROCEDURE — 97112 NEUROMUSCULAR REEDUCATION: CPT

## 2019-09-23 NOTE — PROGRESS NOTES
Discharge Summary    Referring Physician: Dr April Jenkins    Diagnosis:  imbalance, difficulty walking    Pt has attended 6 visits in Physical Therapy. Subjective: Pt reports improvements with PT. He has found his HEP helpful.  He finds walking     Assessment actively participate in planning and for this course of care. Thank you for your referral. If you have any questions, please contact me at Dept: [phone].     Sincerely,    Naheed Hudson PT, NCS    Electronically signed by therapist: Myrna Ruffin

## 2019-10-09 ENCOUNTER — IMMUNIZATION (OUTPATIENT)
Dept: FAMILY MEDICINE CLINIC | Facility: CLINIC | Age: 84
End: 2019-10-09
Payer: MEDICARE

## 2019-10-09 ENCOUNTER — MED REC SCAN ONLY (OUTPATIENT)
Dept: FAMILY MEDICINE CLINIC | Facility: CLINIC | Age: 84
End: 2019-10-09

## 2019-10-09 DIAGNOSIS — Z23 NEED FOR VACCINATION: ICD-10-CM

## 2019-10-09 PROCEDURE — 90662 IIV NO PRSV INCREASED AG IM: CPT | Performed by: PHYSICIAN ASSISTANT

## 2019-10-09 PROCEDURE — G0008 ADMIN INFLUENZA VIRUS VAC: HCPCS | Performed by: PHYSICIAN ASSISTANT

## 2019-10-14 ENCOUNTER — OFFICE VISIT (OUTPATIENT)
Dept: DERMATOLOGY CLINIC | Facility: CLINIC | Age: 84
End: 2019-10-14
Payer: MEDICARE

## 2019-10-14 DIAGNOSIS — D23.30 BENIGN NEOPLASM OF SKIN OF FACE: ICD-10-CM

## 2019-10-14 DIAGNOSIS — D23.60 BENIGN NEOPLASM OF SKIN OF UPPER LIMB, INCLUDING SHOULDER, UNSPECIFIED LATERALITY: ICD-10-CM

## 2019-10-14 DIAGNOSIS — D23.4 BENIGN NEOPLASM OF SCALP AND SKIN OF NECK: ICD-10-CM

## 2019-10-14 DIAGNOSIS — L82.0 INFLAMED SEBORRHEIC KERATOSIS: Primary | ICD-10-CM

## 2019-10-14 DIAGNOSIS — D23.5 BENIGN NEOPLASM OF SKIN OF TRUNK, EXCEPT SCROTUM: ICD-10-CM

## 2019-10-14 DIAGNOSIS — L30.8 PSORIASIFORM DERMATITIS: ICD-10-CM

## 2019-10-14 DIAGNOSIS — L81.4 LENTIGO: ICD-10-CM

## 2019-10-14 PROCEDURE — G0463 HOSPITAL OUTPT CLINIC VISIT: HCPCS | Performed by: DERMATOLOGY

## 2019-10-14 PROCEDURE — 99213 OFFICE O/P EST LOW 20 MIN: CPT | Performed by: DERMATOLOGY

## 2019-10-17 NOTE — TELEPHONE ENCOUNTER
Keppra 1000mg Take 1 tablet BID.     Last filled-5/6/2019 for 6 months    LOV-4/2/2019  NOV-11/8/2019

## 2019-10-18 RX ORDER — LEVETIRACETAM 1000 MG/1
TABLET ORAL
Qty: 60 TABLET | Refills: 0 | Status: SHIPPED | OUTPATIENT
Start: 2019-10-18 | End: 2019-11-14

## 2019-10-27 NOTE — PROGRESS NOTES
Marlen Aquino is a 80year old male. HPI:     CC:  Patient presents with:  Upper Body Exam: LOV 4/2018. Pt requesting upper body exam. Concerned with multiple lesions on neck and temples. Denies family hx of skin cancer.  Deneis personal hx of skin cance 90 capsule, Rfl: 3  aspirin (ASPIR-81) 81 MG Oral Tab EC, Take 1 tablet by mouth daily. , Disp: , Rfl:   LEVETIRACETAM 1000 MG Oral Tab, TAKE ONE TABLET BY MOUTH TWICE DAILY , Disp: 60 tablet, Rfl: 0      Allergies:   No Known Allergies    Past Medical Hist on file    Lifestyle      Physical activity:        Days per week: Not on file        Minutes per session: Not on file      Stress: Not on file    Relationships      Social connections:        Talks on phone: Not on file        Gets together: Not on file Concern with multiple scaly lesions on neck and temples    Past notes/ records and appropriate/relevant lab results including pathology and past body maps reviewed. Updated and new information noted in current visit.      Patient presents with concerns temples, preauricular cheeks lateral anterior neck. Numerous benign keratoses reassurance given. Possible AK early at left temple careful observation consider cryo. Reassurance given    Psoriasiform dermatitis of the forearms. Reassurance given.   Over-

## 2019-11-08 ENCOUNTER — LAB ENCOUNTER (OUTPATIENT)
Dept: LAB | Facility: HOSPITAL | Age: 84
End: 2019-11-08
Attending: Other
Payer: MEDICARE

## 2019-11-08 ENCOUNTER — OFFICE VISIT (OUTPATIENT)
Dept: NEUROLOGY | Facility: CLINIC | Age: 84
End: 2019-11-08
Payer: MEDICARE

## 2019-11-08 VITALS
SYSTOLIC BLOOD PRESSURE: 110 MMHG | DIASTOLIC BLOOD PRESSURE: 66 MMHG | HEIGHT: 69 IN | BODY MASS INDEX: 33.33 KG/M2 | WEIGHT: 225 LBS | HEART RATE: 64 BPM

## 2019-11-08 DIAGNOSIS — G40.909 SEIZURE DISORDER (HCC): ICD-10-CM

## 2019-11-08 DIAGNOSIS — G40.909 SEIZURE DISORDER (HCC): Primary | ICD-10-CM

## 2019-11-08 DIAGNOSIS — Z86.73 HISTORY OF TIA (TRANSIENT ISCHEMIC ATTACK): ICD-10-CM

## 2019-11-08 DIAGNOSIS — G47.9 SLEEP DISORDER: ICD-10-CM

## 2019-11-08 DIAGNOSIS — R25.1 TREMOR: ICD-10-CM

## 2019-11-08 PROCEDURE — 36415 COLL VENOUS BLD VENIPUNCTURE: CPT

## 2019-11-08 PROCEDURE — 80053 COMPREHEN METABOLIC PANEL: CPT

## 2019-11-08 PROCEDURE — 85025 COMPLETE CBC W/AUTO DIFF WBC: CPT

## 2019-11-08 PROCEDURE — 99213 OFFICE O/P EST LOW 20 MIN: CPT | Performed by: OTHER

## 2019-11-08 PROCEDURE — 80177 DRUG SCRN QUAN LEVETIRACETAM: CPT

## 2019-11-08 PROCEDURE — 80164 ASSAY DIPROPYLACETIC ACD TOT: CPT | Performed by: OTHER

## 2019-11-10 NOTE — PROGRESS NOTES
[facility]    Toño Voss is a 80year old male. HPI:     Patient is being seen in follow-up. I saw him last in April. He comes alone to the visit today. No breakthrough seizures.   Feeling generally well although he is apparent distress, pleasant and cooperative   CV: regular rate and rhythm   Lungs: clear to auscultation bilaterally  Neuro:  Mental Status: alert, speech fluent and appropriate       Cranial Nerves: pupils equal, round, and reactive to light; extraocular

## 2019-11-14 ENCOUNTER — TELEPHONE (OUTPATIENT)
Dept: NEUROLOGY | Facility: CLINIC | Age: 84
End: 2019-11-14

## 2019-11-14 RX ORDER — LEVETIRACETAM 1000 MG/1
TABLET ORAL
Qty: 180 TABLET | Refills: 3 | Status: SHIPPED | OUTPATIENT
Start: 2019-11-14 | End: 2020-05-19

## 2019-11-14 NOTE — TELEPHONE ENCOUNTER
Spoke with patient about blood work results. Advised lower Keppra to 500 in the morning and 1000 at night. Monitor for any signs or symptoms of breakthrough seizure.

## 2020-01-13 DIAGNOSIS — G40.909 SEIZURE DISORDER (HCC): ICD-10-CM

## 2020-01-13 RX ORDER — DIVALPROEX SODIUM 500 MG/1
TABLET, EXTENDED RELEASE ORAL
Qty: 180 TABLET | Refills: 1 | Status: SHIPPED | OUTPATIENT
Start: 2020-01-13 | End: 2020-05-19

## 2020-01-13 NOTE — TELEPHONE ENCOUNTER
Divalproex ER 500mg Take 2 tablet daily.      Last filled-7/22/2019 for 6 months    LOV-11/8/2019  NOV-5/8/2020

## 2020-01-14 RX ORDER — LISINOPRIL AND HYDROCHLOROTHIAZIDE 25; 20 MG/1; MG/1
TABLET ORAL
Qty: 90 TABLET | Refills: 0 | Status: SHIPPED | OUTPATIENT
Start: 2020-01-14 | End: 2020-05-14

## 2020-01-14 NOTE — TELEPHONE ENCOUNTER
Please review; protocol failed. Pt is overdue for labs and appt.   Hypertensive Medications  Protocol Criteria:  · Appointment scheduled in the past 6 months or in the next 3 months  · BMP or CMP in the past 12 months  · Creatinine result < 2  Recent Outpat

## 2020-01-26 NOTE — PROGRESS NOTES
[facility] AND [facility]  Neurology Progress Note    Jewell Salcido Patient Status:  Observation    1929 MRN [id number]   Location [facility] 5SW/SE Attending Ruchi Sales MD   Hosp Day # 0 PCP Georgia Hudson MD     Subjective:  Jewell Salcido remote  Mood intact  Fund of knowledge appropriate for education and age    Language intact including: comprehension, naming, repetition, vocabulary    Cranial Nerves:  II.- Visual fields full to confrontation        Fundoscopically- No papilledema or reti atherosclerosis cavernous carotid arteries and right vertebral artery. No major discrepancy with preliminary Vision radiology report.    Dictated by (CST): Yeyo Ford MD on 9/10/2018 at 6:47     Approved by (CST): Yeyo Ford MD on 9/10/2018 at negative...

## 2020-04-15 ENCOUNTER — TELEPHONE (OUTPATIENT)
Dept: NEUROLOGY | Facility: CLINIC | Age: 85
End: 2020-04-15

## 2020-04-15 NOTE — TELEPHONE ENCOUNTER
NOV 5/8/20 previously canceled due to covid 19. Attempted to call to schedule phone/video visit. No answer, am unable to leave message. LOV 11/8/19.

## 2020-05-13 RX ORDER — LISINOPRIL AND HYDROCHLOROTHIAZIDE 25; 20 MG/1; MG/1
TABLET ORAL
Qty: 90 TABLET | Refills: 0 | OUTPATIENT
Start: 2020-05-13

## 2020-05-13 NOTE — TELEPHONE ENCOUNTER
Called pt in regards to calling back to make telephone appt for medication refills, no answer and unable to LM due to no VM available

## 2020-05-14 ENCOUNTER — TELEPHONE (OUTPATIENT)
Dept: FAMILY MEDICINE CLINIC | Facility: CLINIC | Age: 85
End: 2020-05-14

## 2020-05-14 ENCOUNTER — VIRTUAL PHONE E/M (OUTPATIENT)
Dept: FAMILY MEDICINE CLINIC | Facility: CLINIC | Age: 85
End: 2020-05-14
Payer: MEDICARE

## 2020-05-14 DIAGNOSIS — I10 ESSENTIAL HYPERTENSION: Primary | ICD-10-CM

## 2020-05-14 PROCEDURE — 99442 PHONE E/M BY PHYS 11-20 MIN: CPT | Performed by: FAMILY MEDICINE

## 2020-05-14 RX ORDER — LISINOPRIL AND HYDROCHLOROTHIAZIDE 25; 20 MG/1; MG/1
1 TABLET ORAL DAILY
Qty: 90 TABLET | Refills: 0 | Status: SHIPPED | OUTPATIENT
Start: 2020-05-14 | End: 2020-11-10

## 2020-05-14 RX ORDER — LISINOPRIL AND HYDROCHLOROTHIAZIDE 25; 20 MG/1; MG/1
1 TABLET ORAL DAILY
Qty: 90 TABLET | Refills: 0 | Status: SHIPPED | OUTPATIENT
Start: 2020-05-14 | End: 2020-05-21

## 2020-05-14 NOTE — TELEPHONE ENCOUNTER
Pt states that he had a virtual appointment scheduled  for today. Pt states that he is still waiting for a call/virtual visit from the doctor.

## 2020-05-15 NOTE — PROGRESS NOTES
HPI:    Patient ID: Hattie Aponte is a 80year old male.     Patient consents to telephone visit and accepts financial responsibility  15 min spent with the patient on the phone  Patient for f/u hypertension   Denies any chest pain shortness of breath or Imaging & Referrals:  None       TF#[id number]

## 2020-05-18 ENCOUNTER — TELEPHONE (OUTPATIENT)
Dept: NEUROLOGY | Facility: CLINIC | Age: 85
End: 2020-05-18

## 2020-05-19 ENCOUNTER — VIRTUAL PHONE E/M (OUTPATIENT)
Dept: NEUROLOGY | Facility: CLINIC | Age: 85
End: 2020-05-19
Payer: MEDICARE

## 2020-05-19 ENCOUNTER — TELEPHONE (OUTPATIENT)
Dept: NEUROLOGY | Facility: CLINIC | Age: 85
End: 2020-05-19

## 2020-05-19 DIAGNOSIS — G40.909 SEIZURE DISORDER (HCC): ICD-10-CM

## 2020-05-19 PROCEDURE — 99441 PHONE E/M BY PHYS 5-10 MIN: CPT | Performed by: OTHER

## 2020-05-19 RX ORDER — DIVALPROEX SODIUM 500 MG/1
500 TABLET, EXTENDED RELEASE ORAL DAILY
Qty: 90 TABLET | Refills: 3 | Status: SHIPPED | OUTPATIENT
Start: 2020-05-19 | End: 2020-10-01

## 2020-05-19 RX ORDER — LEVETIRACETAM 1000 MG/1
TABLET ORAL
Qty: 135 TABLET | Refills: 3 | Status: SHIPPED | OUTPATIENT
Start: 2020-05-19 | End: 2020-09-03 | Stop reason: ALTCHOICE

## 2020-05-19 NOTE — PROGRESS NOTES
Neurology Telephone / Virtual Follow-up Note    Page Garcia is a 80year old male. HPI:     Patient being seen in follow-up. I saw him in the office last in November. He states he has been doing well. No further seizure activity.   We did carmen disorder     –I again tried to encourage patient to be compliant with CPAP        3. History of TIA     –Continue aspirin, continue blood pressure control, LDL goal less than 70        4.   Tremor  Possibly enhanced by Depakote use.     –As above      Bernadine Mathew

## 2020-05-21 ENCOUNTER — TELEPHONE (OUTPATIENT)
Dept: FAMILY MEDICINE CLINIC | Facility: CLINIC | Age: 85
End: 2020-05-21

## 2020-05-21 NOTE — TELEPHONE ENCOUNTER
medication list updated. Dodd Linear  You 50 minutes ago (2:38 PM)         The medication is listed twice for some reason. I stil lneed it, but it should only be listed once.

## 2020-06-15 ENCOUNTER — LAB ENCOUNTER (OUTPATIENT)
Dept: LAB | Facility: HOSPITAL | Age: 85
End: 2020-06-15
Attending: FAMILY MEDICINE
Payer: MEDICARE

## 2020-06-15 DIAGNOSIS — I10 ESSENTIAL HYPERTENSION: ICD-10-CM

## 2020-06-15 DIAGNOSIS — R35.1 NOCTURIA: ICD-10-CM

## 2020-06-15 DIAGNOSIS — C61 PROSTATE CANCER (HCC): ICD-10-CM

## 2020-06-15 PROCEDURE — 80048 BASIC METABOLIC PNL TOTAL CA: CPT

## 2020-06-15 PROCEDURE — 84153 ASSAY OF PSA TOTAL: CPT

## 2020-06-15 PROCEDURE — 36415 COLL VENOUS BLD VENIPUNCTURE: CPT

## 2020-06-15 PROCEDURE — 81003 URINALYSIS AUTO W/O SCOPE: CPT

## 2020-06-24 NOTE — PROGRESS NOTES
CSS=please call patient to schedule for office visit to discuss blood test results and for medication refill. MyChart message sent.

## 2020-06-26 ENCOUNTER — OFFICE VISIT (OUTPATIENT)
Dept: FAMILY MEDICINE CLINIC | Facility: CLINIC | Age: 85
End: 2020-06-26
Payer: MEDICARE

## 2020-06-26 VITALS
DIASTOLIC BLOOD PRESSURE: 83 MMHG | HEART RATE: 73 BPM | SYSTOLIC BLOOD PRESSURE: 130 MMHG | WEIGHT: 235 LBS | BODY MASS INDEX: 34.8 KG/M2 | TEMPERATURE: 97 F | HEIGHT: 69 IN

## 2020-06-26 DIAGNOSIS — Z00.00 ENCOUNTER FOR ANNUAL HEALTH EXAMINATION: ICD-10-CM

## 2020-06-26 DIAGNOSIS — C61 PROSTATE CANCER (HCC): ICD-10-CM

## 2020-06-26 DIAGNOSIS — R56.9 SEIZURE (HCC): Primary | ICD-10-CM

## 2020-06-26 DIAGNOSIS — I10 ESSENTIAL HYPERTENSION: ICD-10-CM

## 2020-06-26 PROCEDURE — 90732 PPSV23 VACC 2 YRS+ SUBQ/IM: CPT | Performed by: FAMILY MEDICINE

## 2020-06-26 PROCEDURE — G0009 ADMIN PNEUMOCOCCAL VACCINE: HCPCS | Performed by: FAMILY MEDICINE

## 2020-06-26 PROCEDURE — G0463 HOSPITAL OUTPT CLINIC VISIT: HCPCS | Performed by: FAMILY MEDICINE

## 2020-06-26 PROCEDURE — G0439 PPPS, SUBSEQ VISIT: HCPCS | Performed by: FAMILY MEDICINE

## 2020-06-26 PROCEDURE — 99212 OFFICE O/P EST SF 10 MIN: CPT | Performed by: FAMILY MEDICINE

## 2020-06-26 NOTE — PATIENT INSTRUCTIONS
[address] SCHEDULE   Tests on this list are recommended by your physician but may not be covered, or covered at this frequency, by your insurer. Please check with your insurance carrier before scheduling to verify coverage.     PREVENTYANET Colorectal Cancer Screening Covered up to Age 76     Colonoscopy Screen   Covered every 10 years- more often if abnormal There are no preventive care reminders to display for this patient.  Update [employer] if applicable    Flex Sigmoidoscopy Scr covered with a cut with metal- TD and TDaP Not covered by Medicare Part B) No orders found for this or any previous visit.  This may be covered with your prescription benefits, but Medicare does not cover unless Medically needed    Zoster (Not covered by Me

## 2020-06-26 NOTE — PROGRESS NOTES
HPI:   Hattie Aponte is a 80year old male who presents for a Medicare Subsequent Annual Wellness visit (Pt already had Initial Annual Wellness).     Doing well  His last annual assessment has been over 1 year: Annual Physical due on 06/07/2020         Fa History of vitrectomy     Bilateral sensorineural hearing loss     Seizure disorder ([facility] [address]    Wt Readings from Last 3 Encounters:  06/26/20 : 235 lb (106.6 kg)  11/08/19 : 225 lb (102.1 kg)  06/25/19 : 225 lb (102.1 kg)     Last Cholesterol Labs:   Lab Res 5/18/16). His family history is not on file. SOCIAL HISTORY:   He  reports that he quit smoking about 60 years ago. He has never used smokeless tobacco. He reports that he does not drink alcohol or use drugs.      REVIEW OF SYSTEMS:   GENERAL: feels we understanding the speaker in a large room such as at a meeting or place of Jehovah's witness:  Yes   Many people I talk to seem to mumble (or don't speak clearly):  Sometimes People get annoyed because I misunderstand what they say:  Sometimes   I misunderstand what • Pneumococcal (Prevnar 13) 06/07/2019        ASSESSMENT AND OTHER RELEVANT CHRONIC CONDITIONS:   Daiana Salinas is a 80year old male who presents for a Medicare Assessment.      PLAN SUMMARY:   Diagnoses and all orders for this visit:    Seizure ([facility] [address] flowsheet data found. Glaucoma Screening      Ophthalmology Visit Annually: Diabetics, FHx Glaucoma, AA>50, > 65 No flowsheet data found.     Prostate Cancer Screening      PSA  Annually PSA due on 06/15/2021  Update Health Maintenance if applica

## 2020-06-29 ENCOUNTER — TELEPHONE (OUTPATIENT)
Dept: NEUROLOGY | Facility: CLINIC | Age: 85
End: 2020-06-29

## 2020-06-29 NOTE — TELEPHONE ENCOUNTER
Patient is asking if he could take Keppra 750mg tab bid. Currently taking Keppra 500mg in the morning and 1000mg in the evening and is having difficulty cutting pills.   Has some 750mg tabs from previous prescription and would like to know since dose is st

## 2020-06-30 RX ORDER — LEVETIRACETAM 750 MG/1
750 TABLET ORAL 2 TIMES DAILY
Qty: 180 TABLET | Refills: 0 | Status: SHIPPED | OUTPATIENT
Start: 2020-06-30 | End: 2020-09-24

## 2020-06-30 NOTE — TELEPHONE ENCOUNTER
Patient calling to check on status,please call him with a response. I did advise message was sent to Dr Dolores Sabillon for review.  Thanks

## 2020-06-30 NOTE — TELEPHONE ENCOUNTER
Yes, okay to switch to Keppra 750 mg twice daily; can send in new prescription accordingly, if patient requires.

## 2020-06-30 NOTE — TELEPHONE ENCOUNTER
Patient notified of Dr. Ricardo Bowman response. Patient thankful and request 3 month supply. Keppra 750mg bid #180 e-scribed to preferred pharmacy on file.

## 2020-08-09 DIAGNOSIS — R53.83 OTHER FATIGUE: Primary | ICD-10-CM

## 2020-08-10 RX ORDER — TAMSULOSIN HYDROCHLORIDE 0.4 MG/1
CAPSULE ORAL
Qty: 90 CAPSULE | Refills: 0 | OUTPATIENT
Start: 2020-08-10

## 2020-08-10 RX ORDER — TAMSULOSIN HYDROCHLORIDE 0.4 MG/1
0.4 CAPSULE ORAL DAILY
Qty: 90 CAPSULE | Refills: 3 | OUTPATIENT
Start: 2020-08-10

## 2020-08-14 RX ORDER — TAMSULOSIN HYDROCHLORIDE 0.4 MG/1
CAPSULE ORAL
Qty: 90 CAPSULE | Refills: 0 | Status: SHIPPED | OUTPATIENT
Start: 2020-08-14 | End: 2020-11-16

## 2020-08-14 NOTE — TELEPHONE ENCOUNTER
Pt LOV with Dr. Talia Rodrigez was 6/25/19 pt pharmacy requesting refill on tamsulosin if you agree please review and sign med, I copied and pasted part of PVK note below. 1.  Continue tamsulosin 0.4 mg daily     2. Return visit in 1 year.   Please get blood draw

## 2020-09-02 ENCOUNTER — NURSE TRIAGE (OUTPATIENT)
Dept: FAMILY MEDICINE CLINIC | Facility: CLINIC | Age: 85
End: 2020-09-02

## 2020-09-02 NOTE — TELEPHONE ENCOUNTER
Action Requested: Summary for Provider     []  Critical Lab, Recommendations Needed  [] Need Additional Advice  []   FYI    []   Need Orders  [] Need Medications Sent to Pharmacy  []  Other     SUMMARY: Per protocol disposition advised office visit today b

## 2020-09-03 ENCOUNTER — LAB ENCOUNTER (OUTPATIENT)
Dept: LAB | Age: 85
End: 2020-09-03
Attending: FAMILY MEDICINE
Payer: MEDICARE

## 2020-09-03 ENCOUNTER — OFFICE VISIT (OUTPATIENT)
Dept: FAMILY MEDICINE CLINIC | Facility: CLINIC | Age: 85
End: 2020-09-03
Payer: MEDICARE

## 2020-09-03 VITALS
DIASTOLIC BLOOD PRESSURE: 78 MMHG | SYSTOLIC BLOOD PRESSURE: 132 MMHG | HEIGHT: 69 IN | BODY MASS INDEX: 34.36 KG/M2 | WEIGHT: 232 LBS | HEART RATE: 66 BPM

## 2020-09-03 DIAGNOSIS — N28.9 RENAL INSUFFICIENCY: ICD-10-CM

## 2020-09-03 DIAGNOSIS — N28.9 RENAL INSUFFICIENCY: Primary | ICD-10-CM

## 2020-09-03 DIAGNOSIS — R60.0 LEG EDEMA: ICD-10-CM

## 2020-09-03 LAB
ANION GAP SERPL CALC-SCNC: 7 MMOL/L (ref 0–18)
BUN BLD-MCNC: 21 MG/DL (ref 7–18)
BUN/CREAT SERPL: 15.2 (ref 10–20)
CALCIUM BLD-MCNC: 9.4 MG/DL (ref 8.5–10.1)
CHLORIDE SERPL-SCNC: 105 MMOL/L (ref 98–112)
CO2 SERPL-SCNC: 31 MMOL/L (ref 21–32)
CREAT BLD-MCNC: 1.38 MG/DL (ref 0.7–1.3)
GLUCOSE BLD-MCNC: 96 MG/DL (ref 70–99)
OSMOLALITY SERPL CALC.SUM OF ELEC: 299 MOSM/KG (ref 275–295)
PATIENT FASTING Y/N/NP: NO
POTASSIUM SERPL-SCNC: 4.5 MMOL/L (ref 3.5–5.1)
SODIUM SERPL-SCNC: 143 MMOL/L (ref 136–145)

## 2020-09-03 PROCEDURE — 80048 BASIC METABOLIC PNL TOTAL CA: CPT

## 2020-09-03 PROCEDURE — 36415 COLL VENOUS BLD VENIPUNCTURE: CPT

## 2020-09-03 PROCEDURE — G0463 HOSPITAL OUTPT CLINIC VISIT: HCPCS | Performed by: FAMILY MEDICINE

## 2020-09-03 PROCEDURE — 99213 OFFICE O/P EST LOW 20 MIN: CPT | Performed by: FAMILY MEDICINE

## 2020-09-03 RX ORDER — POTASSIUM CHLORIDE 750 MG/1
TABLET, FILM COATED, EXTENDED RELEASE ORAL
Qty: 30 TABLET | Refills: 0 | Status: SHIPPED | OUTPATIENT
Start: 2020-09-03 | End: 2021-09-09

## 2020-09-03 RX ORDER — FUROSEMIDE 20 MG/1
TABLET ORAL
Qty: 30 TABLET | Refills: 0 | Status: SHIPPED | OUTPATIENT
Start: 2020-09-03 | End: 2021-09-09

## 2020-09-03 NOTE — PROGRESS NOTES
HPI:    Patient ID: Ernestina Gage is a 80year old male. Patient has bilateral leg edema mild and its there for last few days. It is worse in pms and then gets better in ams. No shortness of breath no chest pain . Is gaining some weight lately also. weeks  Orders Placed This Encounter      Basic Metabolic Panel (8) [E]      Meds This Visit:  Requested Prescriptions     Signed Prescriptions Disp Refills   • furosemide (LASIX) 20 MG Oral Tab 30 tablet 0     Si po qd prn   • Potassium Chloride ER 10

## 2020-09-11 ENCOUNTER — HOSPITAL ENCOUNTER (OUTPATIENT)
Dept: CV DIAGNOSTICS | Age: 85
Discharge: HOME | End: 2020-09-11
Attending: FAMILY MEDICINE
Payer: MEDICARE

## 2020-09-11 DIAGNOSIS — R60.0 LEG EDEMA: ICD-10-CM

## 2020-09-11 PROCEDURE — 93306 TTE W/DOPPLER COMPLETE: CPT | Performed by: FAMILY MEDICINE

## 2020-09-24 RX ORDER — LEVETIRACETAM 750 MG/1
TABLET ORAL
Qty: 180 TABLET | Refills: 1 | Status: SHIPPED | OUTPATIENT
Start: 2020-09-24 | End: 2020-11-25

## 2020-09-24 NOTE — TELEPHONE ENCOUNTER
Refill request for keppra 750 mg, BID, #180, 1 refill    LOV: 5/19/20  NOV: 11/25/20 with Dr. Jena Martel

## 2020-10-01 ENCOUNTER — PATIENT MESSAGE (OUTPATIENT)
Dept: NEUROLOGY | Facility: CLINIC | Age: 85
End: 2020-10-01

## 2020-10-01 DIAGNOSIS — G40.909 SEIZURE DISORDER (HCC): ICD-10-CM

## 2020-10-01 RX ORDER — DIVALPROEX SODIUM 500 MG/1
500 TABLET, EXTENDED RELEASE ORAL DAILY
Qty: 90 TABLET | Refills: 0 | Status: SHIPPED | OUTPATIENT
Start: 2020-10-01 | End: 2020-11-25

## 2020-10-01 NOTE — TELEPHONE ENCOUNTER
From: Sonja Casiano  To: Airam James MD, MD  Sent: 10/1/2020 12:04 PM CDT  Subject: Other    Dr. Taylor Hsu,  Since transferring from Dr. Renny Tillman to you, I have not been able to obtain an appointment until 11/25/2020. I need a prescription refill now.   A

## 2020-10-01 NOTE — TELEPHONE ENCOUNTER
Refill request for depakote  mg, take 1 tab daily, #90, no refills    LOV: 5/19/20  NOV: 11/25/20 with Dr. Matthew Dial

## 2020-10-19 ENCOUNTER — TELEPHONE (OUTPATIENT)
Dept: PODIATRY CLINIC | Facility: CLINIC | Age: 85
End: 2020-10-19

## 2020-10-19 NOTE — TELEPHONE ENCOUNTER
Patient has not been seen since June 2019. Please call patient to schedule regular office visit for evaluation of ingrown toenails.   If procedure needed it would be scheduled at a later office visit

## 2020-10-21 ENCOUNTER — OFFICE VISIT (OUTPATIENT)
Dept: PODIATRY CLINIC | Facility: CLINIC | Age: 85
End: 2020-10-21
Payer: MEDICARE

## 2020-10-21 DIAGNOSIS — M79.675 PAIN IN TOES OF BOTH FEET: Primary | ICD-10-CM

## 2020-10-21 DIAGNOSIS — B35.1 ONYCHOMYCOSIS: ICD-10-CM

## 2020-10-21 DIAGNOSIS — M79.674 PAIN IN TOES OF BOTH FEET: Primary | ICD-10-CM

## 2020-10-21 PROCEDURE — 11721 DEBRIDE NAIL 6 OR MORE: CPT | Performed by: PODIATRIST

## 2020-10-21 NOTE — PROGRESS NOTES
HPI:    Patient ID: Valdemar Green is a 80year old male. This 72-year-old male presents to the office today having not been seen since June 2019. His chief concern today is recurrent pain associated with all of his toenails.   He states the last time plan follow-up for treatment if and when symptoms redevelop         ASSESSMENT/PLAN:   Pain in toes of both feet  (primary encounter diagnosis)  Onychomycosis    No orders of the defined types were placed in this encounter.       Meds This Visit:  Requested

## 2020-11-05 ENCOUNTER — OFFICE VISIT (OUTPATIENT)
Dept: OPHTHALMOLOGY | Facility: CLINIC | Age: 85
End: 2020-11-05
Payer: MEDICARE

## 2020-11-05 DIAGNOSIS — H43.391 VITREOUS FLOATERS OF RIGHT EYE: ICD-10-CM

## 2020-11-05 DIAGNOSIS — Z96.1 PSEUDOPHAKIA OF BOTH EYES: ICD-10-CM

## 2020-11-05 DIAGNOSIS — H35.89 RPE MOTTLING OF MACULA: Primary | ICD-10-CM

## 2020-11-05 PROCEDURE — 92014 COMPRE OPH EXAM EST PT 1/>: CPT | Performed by: OPHTHALMOLOGY

## 2020-11-05 PROCEDURE — 92015 DETERMINE REFRACTIVE STATE: CPT | Performed by: OPHTHALMOLOGY

## 2020-11-05 NOTE — PROGRESS NOTES
Wellington Saldana is a 80year old male. HPI:     HPI     Patient is here for a complete exam.  He complains of trouble driving at night. He was seen by Dr. Darci Iqbal on 9/04/20 and will follow-up with him in September 2021.       Last edited by Junior Razo, Oral Tab 1 po qd prn 30 tablet 0   • Potassium Chloride ER 10 MEQ Oral Tab CR 1 po qd prn when taking lasix 30 tablet 0   • tamsulosin (FLOMAX) cap TAKE ONE CAPSULE BY MOUTH ONE TIME DAILY  90 capsule 0   • Lisinopril-hydroCHLOROthiazide 20-25 MG Oral Tab Sphere  20/40+ 3.00 20/20    Left -1.25 +1.00 160 20/400 3.00 20/80    Type: Flat top bifocal                 ASSESSMENT/PLAN:     Diagnoses and Plan:     RPE mottling of macula left eye  Continue to follow up yearly with Dr. Trish Colbert as planned.      Pseu

## 2020-11-05 NOTE — PATIENT INSTRUCTIONS
RPE mottling of macula left eye  Continue to follow up yearly with Dr. Francy Matta as planned. Pseudophakia of both eyes  No treatment. New glasses today; update as needed. Discussed that new prescription is only a slight change.    's license visi

## 2020-11-09 NOTE — TELEPHONE ENCOUNTER
Pt LOV with Dr. Brandan Mcneil 6/25/19 pt pharmacy requesting refill on tamsulosin if you agree please review and sign med, I copied and pasted part of PVK last note below. 1.  Continue tamsulosin 0.4 mg daily     2. Return visit in 1 year.

## 2020-11-10 RX ORDER — LISINOPRIL AND HYDROCHLOROTHIAZIDE 25; 20 MG/1; MG/1
1 TABLET ORAL DAILY
Qty: 90 TABLET | Refills: 0 | Status: SHIPPED | OUTPATIENT
Start: 2020-11-10 | End: 2021-02-19

## 2020-11-10 RX ORDER — TAMSULOSIN HYDROCHLORIDE 0.4 MG/1
CAPSULE ORAL
Qty: 90 CAPSULE | Refills: 3 | OUTPATIENT
Start: 2020-11-10

## 2020-11-13 NOTE — TELEPHONE ENCOUNTER
I called pt left message can pt make maryellen for  4-6 months from now and if he does we can refill med until his appointment. Please inform pt of this.

## 2020-11-13 NOTE — TELEPHONE ENCOUNTER
I call pt ELIJAH on his mobile number that pt needs to call the office and make maryellen before we can refill his med.

## 2020-11-17 ENCOUNTER — OFFICE VISIT (OUTPATIENT)
Dept: PODIATRY CLINIC | Facility: CLINIC | Age: 85
End: 2020-11-17
Payer: MEDICARE

## 2020-11-17 DIAGNOSIS — L60.0 INGROWN TOENAIL OF BOTH FEET: ICD-10-CM

## 2020-11-17 DIAGNOSIS — M79.675 PAIN IN TOES OF BOTH FEET: Primary | ICD-10-CM

## 2020-11-17 DIAGNOSIS — M79.674 PAIN IN TOES OF BOTH FEET: Primary | ICD-10-CM

## 2020-11-17 PROCEDURE — G0463 HOSPITAL OUTPT CLINIC VISIT: HCPCS | Performed by: PODIATRIST

## 2020-11-17 PROCEDURE — 99212 OFFICE O/P EST SF 10 MIN: CPT | Performed by: PODIATRIST

## 2020-11-17 RX ORDER — TAMSULOSIN HYDROCHLORIDE 0.4 MG/1
0.4 CAPSULE ORAL DAILY
Qty: 90 CAPSULE | Refills: 0 | Status: SHIPPED | OUTPATIENT
Start: 2020-11-17 | End: 2021-01-26

## 2020-11-17 NOTE — TELEPHONE ENCOUNTER
Dr. Sunil Cheney pt does not want to come into the office during [place], pt does can not do a video visit can you refill pt tamsulosin? Pt LOV with you 6/25/2019 please advise. 1.  Continue tamsulosin 0.4 mg daily     2. Return visit in 1 year.

## 2020-11-17 NOTE — PROGRESS NOTES
HPI:    Patient ID: Epifanio Waters is a 80year old male. This 80-year-old male presents with continued pain associated with both of his great toenails.   I saw him a few weeks ago and trimmed his toenails and he said although he had some relief they cont his questions and I believe him to be well-informed         ASSESSMENT/PLAN:   Pain in toes of both feet  (primary encounter diagnosis)  Ingrown toenail of both feet    No orders of the defined types were placed in this encounter.       Meds This Visit:  Re

## 2020-11-25 ENCOUNTER — OFFICE VISIT (OUTPATIENT)
Dept: NEUROLOGY | Facility: CLINIC | Age: 85
End: 2020-11-25
Payer: MEDICARE

## 2020-11-25 VITALS
HEIGHT: 69 IN | BODY MASS INDEX: 33.33 KG/M2 | SYSTOLIC BLOOD PRESSURE: 130 MMHG | WEIGHT: 225 LBS | DIASTOLIC BLOOD PRESSURE: 74 MMHG

## 2020-11-25 DIAGNOSIS — G40.909 SEIZURE DISORDER (HCC): ICD-10-CM

## 2020-11-25 PROCEDURE — 99214 OFFICE O/P EST MOD 30 MIN: CPT | Performed by: OTHER

## 2020-11-25 RX ORDER — LEVETIRACETAM 750 MG/1
750 TABLET ORAL 2 TIMES DAILY
Qty: 180 TABLET | Refills: 3 | Status: SHIPPED | OUTPATIENT
Start: 2020-11-25 | End: 2021-11-11

## 2020-11-25 RX ORDER — DIVALPROEX SODIUM 500 MG/1
500 TABLET, EXTENDED RELEASE ORAL DAILY
Qty: 90 TABLET | Refills: 3 | Status: SHIPPED | OUTPATIENT
Start: 2020-11-25 | End: 2021-11-11

## 2020-11-25 NOTE — PROGRESS NOTES
Prior to Mr Marlene Durham visit I reviewed all prior neurology records. Original diagnosis of TIA. Subsequent diagnosis of a seizure. Abnormal EEG with left temporal lobe focal epileptogenic discharges, physicians notes, labs, radiology studies.   He has not

## 2020-12-15 ENCOUNTER — OFFICE VISIT (OUTPATIENT)
Dept: PODIATRY CLINIC | Facility: CLINIC | Age: 85
End: 2020-12-15
Payer: MEDICARE

## 2020-12-15 VITALS — SYSTOLIC BLOOD PRESSURE: 132 MMHG | HEART RATE: 70 BPM | DIASTOLIC BLOOD PRESSURE: 67 MMHG | RESPIRATION RATE: 18 BRPM

## 2020-12-15 DIAGNOSIS — L60.0 INGROWN TOENAIL OF BOTH FEET: Primary | ICD-10-CM

## 2020-12-15 PROCEDURE — 11750 EXCISION NAIL&NAIL MATRIX: CPT | Performed by: PODIATRIST

## 2020-12-15 NOTE — PROCEDURES
Per verbal order from SCR, draw up 1.5ml of 0.5% Marcaine and 1.5ml of 2% lidocaine for injection to bilateral Great toes Edilberto Trent RN  Patient left office prior to obtaining post procedure vitals.

## 2020-12-15 NOTE — PROGRESS NOTES
HPI:    Patient ID: Bernardino Park is a 80year old male. 17-year-old male presents for the correction of ingrown toenails. He continues to have chronic recurrent frustration and difficulty managing and caring for them.   I reviewed the procedure, postop ASSESSMENT/PLAN:   Ingrown toenail of both feet  (primary encounter diagnosis)    No orders of the defined types were placed in this encounter.       Meds This Visit:  Requested Prescriptions      No prescriptions requested or ordered in this encounter

## 2020-12-23 ENCOUNTER — OFFICE VISIT (OUTPATIENT)
Dept: PODIATRY CLINIC | Facility: CLINIC | Age: 85
End: 2020-12-23
Payer: MEDICARE

## 2020-12-23 DIAGNOSIS — L60.0 INGROWN TOENAIL OF BOTH FEET: Primary | ICD-10-CM

## 2020-12-23 PROCEDURE — G0463 HOSPITAL OUTPT CLINIC VISIT: HCPCS | Performed by: PODIATRIST

## 2020-12-23 PROCEDURE — 99024 POSTOP FOLLOW-UP VISIT: CPT | Performed by: PODIATRIST

## 2020-12-23 NOTE — PROGRESS NOTES
HPI:    Patient ID: Saundra Ortiz is a 80year old male. 80-year-old male presents 1 week post ingrown toenail procedure of both great toes. He has no noted concerns and states he is doing well.       ROS:              Current Outpatient Medications   M

## 2021-01-06 ENCOUNTER — LAB ENCOUNTER (OUTPATIENT)
Dept: LAB | Age: 86
End: 2021-01-06
Attending: FAMILY MEDICINE
Payer: MEDICARE

## 2021-01-06 ENCOUNTER — IMMUNIZATION (OUTPATIENT)
Dept: FAMILY MEDICINE CLINIC | Facility: CLINIC | Age: 86
End: 2021-01-06
Payer: MEDICARE

## 2021-01-06 DIAGNOSIS — Z23 NEED FOR VACCINATION: ICD-10-CM

## 2021-01-06 DIAGNOSIS — R53.83 OTHER FATIGUE: ICD-10-CM

## 2021-01-06 LAB
ALBUMIN SERPL-MCNC: 3.1 G/DL (ref 3.4–5)
ALBUMIN/GLOB SERPL: 0.9 {RATIO} (ref 1–2)
ALP LIVER SERPL-CCNC: 78 U/L
ALT SERPL-CCNC: 24 U/L
ANION GAP SERPL CALC-SCNC: 4 MMOL/L (ref 0–18)
AST SERPL-CCNC: 16 U/L (ref 15–37)
BASOPHILS # BLD AUTO: 0.03 X10(3) UL (ref 0–0.2)
BASOPHILS NFR BLD AUTO: 0.5 %
BILIRUB SERPL-MCNC: 0.5 MG/DL (ref 0.1–2)
BUN BLD-MCNC: 23 MG/DL (ref 7–18)
BUN/CREAT SERPL: 14.9 (ref 10–20)
CALCIUM BLD-MCNC: 9.2 MG/DL (ref 8.5–10.1)
CHLORIDE SERPL-SCNC: 106 MMOL/L (ref 98–112)
CO2 SERPL-SCNC: 32 MMOL/L (ref 21–32)
CREAT BLD-MCNC: 1.54 MG/DL
DEPRECATED RDW RBC AUTO: 40.9 FL (ref 35.1–46.3)
EOSINOPHIL # BLD AUTO: 0.11 X10(3) UL (ref 0–0.7)
EOSINOPHIL NFR BLD AUTO: 2 %
ERYTHROCYTE [DISTWIDTH] IN BLOOD BY AUTOMATED COUNT: 12.2 % (ref 11–15)
GLOBULIN PLAS-MCNC: 3.4 G/DL (ref 2.8–4.4)
GLUCOSE BLD-MCNC: 163 MG/DL (ref 70–99)
HCT VFR BLD AUTO: 44 %
HGB BLD-MCNC: 14.6 G/DL
IMM GRANULOCYTES # BLD AUTO: 0.02 X10(3) UL (ref 0–1)
IMM GRANULOCYTES NFR BLD: 0.4 %
LYMPHOCYTES # BLD AUTO: 1.14 X10(3) UL (ref 1–4)
LYMPHOCYTES NFR BLD AUTO: 20.7 %
M PROTEIN MFR SERPL ELPH: 6.5 G/DL (ref 6.4–8.2)
MCH RBC QN AUTO: 30.4 PG (ref 26–34)
MCHC RBC AUTO-ENTMCNC: 33.2 G/DL (ref 31–37)
MCV RBC AUTO: 91.5 FL
MONOCYTES # BLD AUTO: 0.52 X10(3) UL (ref 0.1–1)
MONOCYTES NFR BLD AUTO: 9.5 %
NEUTROPHILS # BLD AUTO: 3.68 X10 (3) UL (ref 1.5–7.7)
NEUTROPHILS # BLD AUTO: 3.68 X10(3) UL (ref 1.5–7.7)
NEUTROPHILS NFR BLD AUTO: 66.9 %
OSMOLALITY SERPL CALC.SUM OF ELEC: 301 MOSM/KG (ref 275–295)
PATIENT FASTING Y/N/NP: NO
PLATELET # BLD AUTO: 203 10(3)UL (ref 150–450)
POTASSIUM SERPL-SCNC: 3.6 MMOL/L (ref 3.5–5.1)
RBC # BLD AUTO: 4.81 X10(6)UL
SODIUM SERPL-SCNC: 142 MMOL/L (ref 136–145)
TSI SER-ACNC: 7.15 MIU/ML (ref 0.36–3.74)
WBC # BLD AUTO: 5.5 X10(3) UL (ref 4–11)

## 2021-01-06 PROCEDURE — 85025 COMPLETE CBC W/AUTO DIFF WBC: CPT

## 2021-01-06 PROCEDURE — 80053 COMPREHEN METABOLIC PANEL: CPT

## 2021-01-06 PROCEDURE — 90662 IIV NO PRSV INCREASED AG IM: CPT | Performed by: NURSE PRACTITIONER

## 2021-01-06 PROCEDURE — 84443 ASSAY THYROID STIM HORMONE: CPT

## 2021-01-06 PROCEDURE — 36415 COLL VENOUS BLD VENIPUNCTURE: CPT

## 2021-01-06 PROCEDURE — G0008 ADMIN INFLUENZA VIRUS VAC: HCPCS | Performed by: NURSE PRACTITIONER

## 2021-01-11 NOTE — TELEPHONE ENCOUNTER
Behind the wheel driving evaluation from [address] ability lab received, reviewed. In summary, recommendations include outside blind spot mirrors, minimizing vehicle distractions (e.g. minimize talking and radio, NO cell phone).     Will inform patiblair Cimetidine Pregnancy And Lactation Text: This medication is Pregnancy Category B and is considered safe during pregnancy. It is also excreted in breast milk and breast feeding isn't recommended.

## 2021-01-13 ENCOUNTER — TELEPHONE (OUTPATIENT)
Dept: NEUROLOGY | Facility: CLINIC | Age: 86
End: 2021-01-13

## 2021-01-13 ENCOUNTER — OFFICE VISIT (OUTPATIENT)
Dept: PODIATRY CLINIC | Facility: CLINIC | Age: 86
End: 2021-01-13
Payer: MEDICARE

## 2021-01-13 DIAGNOSIS — L60.0 INGROWN TOENAIL OF BOTH FEET: Primary | ICD-10-CM

## 2021-01-13 DIAGNOSIS — N28.9 RENAL INSUFFICIENCY: Primary | ICD-10-CM

## 2021-01-13 PROCEDURE — 99212 OFFICE O/P EST SF 10 MIN: CPT | Performed by: PODIATRIST

## 2021-01-13 NOTE — TELEPHONE ENCOUNTER
Pt dropped off paperwork from [facility] that needs to filled out-placed in nurse bin

## 2021-01-13 NOTE — PROGRESS NOTES
HPI:    Patient ID: Satya Linton is a 80year old male. He 3year-old male presents 1 month post ingrown toenail procedure medial border of both great toes. He presents today with no complaints or noted concerns.       ROS:              Current Outpati

## 2021-01-14 ENCOUNTER — VIRTUAL PHONE E/M (OUTPATIENT)
Dept: FAMILY MEDICINE CLINIC | Facility: CLINIC | Age: 86
End: 2021-01-14
Payer: MEDICARE

## 2021-01-14 DIAGNOSIS — R73.9 HYPERGLYCEMIA: Primary | ICD-10-CM

## 2021-01-14 DIAGNOSIS — E03.9 HYPOTHYROIDISM, UNSPECIFIED TYPE: ICD-10-CM

## 2021-01-14 PROCEDURE — 99442 PHONE E/M BY PHYS 11-20 MIN: CPT | Performed by: FAMILY MEDICINE

## 2021-01-14 RX ORDER — LEVOTHYROXINE SODIUM 0.05 MG/1
50 TABLET ORAL
Qty: 90 TABLET | Refills: 1 | Status: SHIPPED | OUTPATIENT
Start: 2021-01-14 | End: 2021-06-28

## 2021-01-14 NOTE — TELEPHONE ENCOUNTER
Received driving forms. Patient last seen on 11/25/20 by Dr. Jena Martel. He has remained seizure free since September 2018. He is taking keppra and depakote. Form filled out. Placed on Dr. Stefano Valentin desgracy for signature.

## 2021-01-15 NOTE — PROGRESS NOTES
HPI:    Patient ID: Mary Ellen Farfan is a 80year old male.     Telehealth outside of [facility] [address] Verbal Consent   I conducted a telehealth visit with Mary Ellen Farfan today, 01/14/21, which was completed using two-way, real-time interactive audio and Negative for apnea, cough, chest tightness and shortness of breath. Cardiovascular: Negative. Negative for chest pain, palpitations and leg swelling. Gastrointestinal: Negative. Negative for abdominal pain. Skin: Negative.               Current Out

## 2021-01-25 NOTE — TELEPHONE ENCOUNTER
Patient's last office visit 6/2019 about 1.5 years ago. Taking tamsulosin for Nocturia    Last refill 11/17/20 for #90. Patient asking for another refill. Would you approve?

## 2021-01-26 ENCOUNTER — TELEPHONE (OUTPATIENT)
Dept: SURGERY | Facility: CLINIC | Age: 86
End: 2021-01-26

## 2021-01-26 ENCOUNTER — PATIENT MESSAGE (OUTPATIENT)
Dept: SURGERY | Facility: CLINIC | Age: 86
End: 2021-01-26

## 2021-01-26 RX ORDER — TAMSULOSIN HYDROCHLORIDE 0.4 MG/1
0.4 CAPSULE ORAL DAILY
Qty: 90 CAPSULE | Refills: 0 | Status: CANCELLED | OUTPATIENT
Start: 2021-01-26

## 2021-01-26 RX ORDER — TAMSULOSIN HYDROCHLORIDE 0.4 MG/1
CAPSULE ORAL
Qty: 90 CAPSULE | Refills: 0 | OUTPATIENT
Start: 2021-01-26

## 2021-01-26 RX ORDER — TAMSULOSIN HYDROCHLORIDE 0.4 MG/1
0.4 CAPSULE ORAL DAILY
Qty: 90 CAPSULE | Refills: 0 | Status: SHIPPED | OUTPATIENT
Start: 2021-01-26 | End: 2021-05-11

## 2021-01-26 NOTE — TELEPHONE ENCOUNTER
From: Satya Linton  To: Elin Emerson MD  Sent: 1/26/2021 3:51 PM CST  Subject: Prescription Question    My pharmacy said my refill request for flomax was denied. If the reason is that I have not seen you recently, let me explain.   I'm not comfortab

## 2021-01-26 NOTE — TELEPHONE ENCOUNTER
----- Message from Ajay Shah sent at 1/26/2021  3:51 PM CST -----  Regarding: Prescription Question  Contact: [phone]  My pharmacy said my refill request for flomax was denied. If the reason is that I have not seen you recently, let me explain.

## 2021-01-27 RX ORDER — TAMSULOSIN HYDROCHLORIDE 0.4 MG/1
CAPSULE ORAL
Qty: 90 CAPSULE | Refills: 0 | OUTPATIENT
Start: 2021-01-27

## 2021-01-27 NOTE — TELEPHONE ENCOUNTER
3 months supple previously e-scribed to pharmacy on 01/26/2021 by [facility]

## 2021-02-19 RX ORDER — LISINOPRIL AND HYDROCHLOROTHIAZIDE 25; 20 MG/1; MG/1
1 TABLET ORAL DAILY
Qty: 90 TABLET | Refills: 0 | Status: SHIPPED | OUTPATIENT
Start: 2021-02-19 | End: 2021-05-21

## 2021-03-01 ENCOUNTER — LAB ENCOUNTER (OUTPATIENT)
Dept: LAB | Facility: HOSPITAL | Age: 86
End: 2021-03-01
Attending: UROLOGY
Payer: MEDICARE

## 2021-03-01 DIAGNOSIS — R73.9 HYPERGLYCEMIA: ICD-10-CM

## 2021-03-01 DIAGNOSIS — C61 PROSTATE CANCER (HCC): ICD-10-CM

## 2021-03-01 DIAGNOSIS — C61 PROSTATE CANCER (HCC): Primary | ICD-10-CM

## 2021-03-01 DIAGNOSIS — E03.9 HYPOTHYROIDISM, UNSPECIFIED TYPE: ICD-10-CM

## 2021-03-01 DIAGNOSIS — N28.9 RENAL INSUFFICIENCY: ICD-10-CM

## 2021-03-01 LAB
ANION GAP SERPL CALC-SCNC: 6 MMOL/L (ref 0–18)
BILIRUB UR QL: NEGATIVE
BUN BLD-MCNC: 30 MG/DL (ref 7–18)
BUN/CREAT SERPL: 22.1 (ref 10–20)
CALCIUM BLD-MCNC: 8.8 MG/DL (ref 8.5–10.1)
CHLORIDE SERPL-SCNC: 105 MMOL/L (ref 98–112)
CO2 SERPL-SCNC: 31 MMOL/L (ref 21–32)
COLOR UR: YELLOW
CREAT BLD-MCNC: 1.36 MG/DL
EST. AVERAGE GLUCOSE BLD GHB EST-MCNC: 128 MG/DL (ref 68–126)
GLUCOSE BLD-MCNC: 84 MG/DL (ref 70–99)
GLUCOSE UR-MCNC: NEGATIVE MG/DL
HBA1C MFR BLD HPLC: 6.1 % (ref ?–5.7)
HGB UR QL STRIP.AUTO: NEGATIVE
KETONES UR-MCNC: NEGATIVE MG/DL
LEUKOCYTE ESTERASE UR QL STRIP.AUTO: NEGATIVE
NITRITE UR QL STRIP.AUTO: NEGATIVE
OSMOLALITY SERPL CALC.SUM OF ELEC: 299 MOSM/KG (ref 275–295)
PATIENT FASTING Y/N/NP: NO
PH UR: 6 [PH] (ref 5–8)
POTASSIUM SERPL-SCNC: 3.7 MMOL/L (ref 3.5–5.1)
PROT UR-MCNC: NEGATIVE MG/DL
PSA SERPL-MCNC: 0.09 NG/ML (ref ?–4)
SODIUM SERPL-SCNC: 142 MMOL/L (ref 136–145)
SP GR UR STRIP: 1.02 (ref 1–1.03)
TSI SER-ACNC: 1.21 MIU/ML (ref 0.36–3.74)
UROBILINOGEN UR STRIP-ACNC: <2

## 2021-03-01 PROCEDURE — 83036 HEMOGLOBIN GLYCOSYLATED A1C: CPT

## 2021-03-01 PROCEDURE — 84443 ASSAY THYROID STIM HORMONE: CPT

## 2021-03-01 PROCEDURE — 81003 URINALYSIS AUTO W/O SCOPE: CPT

## 2021-03-01 PROCEDURE — 80048 BASIC METABOLIC PNL TOTAL CA: CPT

## 2021-03-01 PROCEDURE — 36415 COLL VENOUS BLD VENIPUNCTURE: CPT

## 2021-03-01 PROCEDURE — 84153 ASSAY OF PSA TOTAL: CPT

## 2021-03-05 ENCOUNTER — OFFICE VISIT (OUTPATIENT)
Dept: SURGERY | Facility: CLINIC | Age: 86
End: 2021-03-05
Payer: MEDICARE

## 2021-03-05 VITALS — SYSTOLIC BLOOD PRESSURE: 97 MMHG | HEART RATE: 76 BPM | DIASTOLIC BLOOD PRESSURE: 61 MMHG | TEMPERATURE: 97 F

## 2021-03-05 DIAGNOSIS — R80.1 PERSISTENT PROTEINURIA: ICD-10-CM

## 2021-03-05 DIAGNOSIS — R35.0 BENIGN PROSTATIC HYPERPLASIA WITH URINARY FREQUENCY: ICD-10-CM

## 2021-03-05 DIAGNOSIS — R35.1 NOCTURIA: ICD-10-CM

## 2021-03-05 DIAGNOSIS — N28.1 KIDNEY CYST, ACQUIRED: ICD-10-CM

## 2021-03-05 DIAGNOSIS — C61 PROSTATE CANCER (HCC): Primary | ICD-10-CM

## 2021-03-05 DIAGNOSIS — N19 RENAL FAILURE, UNSPECIFIED CHRONICITY: ICD-10-CM

## 2021-03-05 DIAGNOSIS — Z79.82 LONG TERM CURRENT USE OF ASPIRIN: ICD-10-CM

## 2021-03-05 DIAGNOSIS — N40.1 BENIGN PROSTATIC HYPERPLASIA WITH URINARY FREQUENCY: ICD-10-CM

## 2021-03-05 PROCEDURE — 99214 OFFICE O/P EST MOD 30 MIN: CPT | Performed by: UROLOGY

## 2021-03-05 NOTE — PROGRESS NOTES
HPI:    Patient ID: Brigette Trujillo is a 80year old male. HPI     Prostate cancer  Chronic. Diagnosed 01/29/2011 prostate biopsy with pathology = stage T1c, Deborah 3+3 adenocarcinoma of the prostate; low volume.  01/16/2012 Gold Marker Placement; 3 mar Patient feels this is stable as he is currently asymptomatic.      Anticoagulation  Patient is currently taking aspirin 81 mg daily; denies side effects.  He feels this is stable.           Review of previous records:   11/29/2011 Office visit with me; Asiya Husain for fever. HENT: Negative for voice change. Respiratory: Negative for chest tightness and shortness of breath. Cardiovascular: Negative for chest pain. Gastrointestinal: Negative for abdominal pain and constipation.    Genitourinary: Negative for 2006   • OTHER SURGICAL HISTORY Right 2014    Per NG: Post op CME -- IVK   • VASECTOMY Left 1997 / 2010    Per NG: Macular Puckering with CME   • VITRECTOMY,MECHANICAL Left 9/1/10    for macular pucker with CME with Dr. Dang Rota Genitourinary Comments: On KUSUM, prostate 1+ enlarged, soft, no palpable nodules or indurations. Musculoskeletal: Normal range of motion. Neurological: He is alert and oriented to person, place, and time. Skin: Skin is dry.    Psychiatric: He has a otherwise too small to definitively characterize but may also represent a hemorrhagic cyst; There is a 31 mm water density cyst in the interpolar region of the left kidney.  Additional subcentimeter low attenuation foci also likely representing cysts are pr = 39. Patient presently denies any associated symptoms. I advise patient to increase his daily water intake by 24-30 oz; he understands and agrees--please see instructions below.  He feels this is stable and chooses to continue observation.      (R80.1) Per the morning    3. Continue tamsulosin 0.4 mg daily    4. Visit in 1 year. Please get blood draw for PSA and submit urine urinalysis test 1--10 days before visit.        Orders Placed This Encounter      PSA - DIAGNOSTIC      Urinalysis, Routine- Future

## 2021-03-05 NOTE — PATIENT INSTRUCTIONS
Kb Robison M.D.    1.  Please set aside a container with 3 glasses of water in it and you have to finish drinking a container of water before dinnertime every day    2.   Please have your 2 cups of coffee before 12

## 2021-03-07 ENCOUNTER — PATIENT MESSAGE (OUTPATIENT)
Dept: FAMILY MEDICINE CLINIC | Facility: CLINIC | Age: 86
End: 2021-03-07

## 2021-03-07 DIAGNOSIS — N28.9 RENAL DYSFUNCTION: Primary | ICD-10-CM

## 2021-03-08 RX ORDER — LISINOPRIL 20 MG/1
20 TABLET ORAL DAILY
Qty: 90 TABLET | Refills: 1 | Status: SHIPPED | OUTPATIENT
Start: 2021-03-08 | End: 2021-08-30

## 2021-03-08 NOTE — TELEPHONE ENCOUNTER
From: Dimitri Lunsford  To: Demetri Vega MD  Sent: 3/7/2021 3:53 PM CST  Subject: Test Results Question    I had the tests taken on 3/1/21 that you requested on my last appointment.   Based on the test results, do you recommend any change in my treatment or

## 2021-03-20 NOTE — TELEPHONE ENCOUNTER
LVM to inform Pt that he needs to schedule a phone visit in regards of Rx refill. Onset: ongoing    Location / description: Patient reports having a rapid heart rate, low BP and dry heaving. States his BP was 106/56 this morning and HR-104 when he saw Dr. Villela the cardiologist this morning.  States currently (2120), his heart is not beating rapidly but it was not too long ago.  Denies chest pain or trouble breathing. Does report earlier today he was off balance and running into things while walking but is not currently dizzy or lightheaded.    Wondering if the problem is his kidneys. States last year he was seen for urinary issues, burning and frequency along with kidney pain. Currently reports frequency and occasional burning as well.     Precipitating Factors: anxiety    Pain Scale (1 - 10), 10 highest: urination pain 4-5/10    Associated Symptoms: see above    What  improves / worsens symptoms: see above    Symptom specific medications: per MAR    Recent Care: 3/19/21: Cardiology: palpitations; dizziness    Reason for Disposition  • New or worsened shortness of breath with activity (dyspnea on exertion)    Protocols used: HEART RATE AND HEARTBEAT [id number]    Patient advised to be seen in the ED for evaluation of above symptoms. Patient verbalizes understanding and has no other questions at this time.

## 2021-05-11 RX ORDER — TAMSULOSIN HYDROCHLORIDE 0.4 MG/1
CAPSULE ORAL
Qty: 90 CAPSULE | Refills: 3 | Status: SHIPPED | OUTPATIENT
Start: 2021-05-11 | End: 2022-01-20

## 2021-05-11 NOTE — TELEPHONE ENCOUNTER
This RN approved the Tamsulosin refill request today, 5/11. Note, this patient was last seen by Dr Pushpa Whitlock on 3/5/21:     Treatment Plan & Patient Instructions     1.   Please set aside a container with 3 glasses of water in it and you have to finish tereza 06/15/2020    PSA 0.10 05/23/2019    PSA 0.1 04/09/2018    PSA 0.1 04/20/2017     PSA Screen:  No results found for: PSAS

## 2021-05-20 ENCOUNTER — TELEPHONE (OUTPATIENT)
Dept: FAMILY MEDICINE CLINIC | Facility: CLINIC | Age: 86
End: 2021-05-20

## 2021-06-16 ENCOUNTER — NURSE TRIAGE (OUTPATIENT)
Dept: FAMILY MEDICINE CLINIC | Facility: CLINIC | Age: 86
End: 2021-06-16
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
## 2021-06-17 ENCOUNTER — PATIENT MESSAGE (OUTPATIENT)
Dept: FAMILY MEDICINE CLINIC | Facility: CLINIC | Age: 86
End: 2021-06-17

## 2021-06-21 ENCOUNTER — LAB ENCOUNTER (OUTPATIENT)
Dept: LAB | Facility: HOSPITAL | Age: 86
End: 2021-06-21
Attending: FAMILY MEDICINE
Payer: MEDICARE

## 2021-06-21 ENCOUNTER — PATIENT MESSAGE (OUTPATIENT)
Dept: FAMILY MEDICINE CLINIC | Facility: CLINIC | Age: 86
End: 2021-06-21

## 2021-06-21 DIAGNOSIS — N28.9 RENAL DYSFUNCTION: ICD-10-CM

## 2021-06-21 PROCEDURE — 36415 COLL VENOUS BLD VENIPUNCTURE: CPT

## 2021-06-21 PROCEDURE — 80048 BASIC METABOLIC PNL TOTAL CA: CPT

## 2021-06-24 ENCOUNTER — MED REC SCAN ONLY (OUTPATIENT)
Dept: FAMILY MEDICINE CLINIC | Facility: CLINIC | Age: 86
End: 2021-06-24

## 2021-06-24 NOTE — TELEPHONE ENCOUNTER
[address] tel# [phone]; was transferred to Medical Record Release Dept. Successfully faxed a request for Medical Record Release along w/ pt's note as a prof to [phone].  Confirmation #  And paperwork placed in dr Vic May'

## 2021-06-25 ENCOUNTER — OFFICE VISIT (OUTPATIENT)
Dept: FAMILY MEDICINE CLINIC | Facility: CLINIC | Age: 86
End: 2021-06-25
Payer: MEDICARE

## 2021-06-25 ENCOUNTER — HOSPITAL ENCOUNTER (OUTPATIENT)
Dept: CT IMAGING | Facility: HOSPITAL | Age: 86
Discharge: HOME | End: 2021-06-25
Attending: FAMILY MEDICINE
Payer: MEDICARE

## 2021-06-25 VITALS
HEART RATE: 60 BPM | HEIGHT: 69 IN | WEIGHT: 213 LBS | SYSTOLIC BLOOD PRESSURE: 149 MMHG | BODY MASS INDEX: 31.55 KG/M2 | DIASTOLIC BLOOD PRESSURE: 70 MMHG

## 2021-06-25 DIAGNOSIS — R93.429 ABNORMAL CT SCAN, KIDNEY: ICD-10-CM

## 2021-06-25 DIAGNOSIS — R93.429 ABNORMAL CT SCAN, KIDNEY: Primary | ICD-10-CM

## 2021-06-25 PROCEDURE — 76377 3D RENDER W/INTRP POSTPROCES: CPT | Performed by: FAMILY MEDICINE

## 2021-06-25 PROCEDURE — 99213 OFFICE O/P EST LOW 20 MIN: CPT | Performed by: FAMILY MEDICINE

## 2021-06-25 PROCEDURE — 74178 CT ABD&PLV WO CNTR FLWD CNTR: CPT | Performed by: FAMILY MEDICINE

## 2021-06-28 ENCOUNTER — OFFICE VISIT (OUTPATIENT)
Dept: SURGERY | Facility: CLINIC | Age: 86
End: 2021-06-28
Payer: MEDICARE

## 2021-06-28 VITALS
DIASTOLIC BLOOD PRESSURE: 75 MMHG | BODY MASS INDEX: 31 KG/M2 | WEIGHT: 213 LBS | SYSTOLIC BLOOD PRESSURE: 178 MMHG | HEART RATE: 72 BPM

## 2021-06-28 DIAGNOSIS — R35.1 NOCTURIA: ICD-10-CM

## 2021-06-28 DIAGNOSIS — N28.1 KIDNEY CYST, ACQUIRED: ICD-10-CM

## 2021-06-28 DIAGNOSIS — R93.5 ABNORMAL CT OF THE ABDOMEN: ICD-10-CM

## 2021-06-28 DIAGNOSIS — C61 PROSTATE CANCER (HCC): ICD-10-CM

## 2021-06-28 DIAGNOSIS — N19 RENAL FAILURE, UNSPECIFIED CHRONICITY: ICD-10-CM

## 2021-06-28 DIAGNOSIS — R80.1 PERSISTENT PROTEINURIA: ICD-10-CM

## 2021-06-28 DIAGNOSIS — D49.519 RENAL NEOPLASM: Primary | ICD-10-CM

## 2021-06-28 DIAGNOSIS — Z79.82 LONG TERM CURRENT USE OF ASPIRIN: ICD-10-CM

## 2021-06-28 DIAGNOSIS — K76.9 LIVER LESION: ICD-10-CM

## 2021-06-28 DIAGNOSIS — N40.1 BENIGN PROSTATIC HYPERPLASIA WITH URINARY FREQUENCY: ICD-10-CM

## 2021-06-28 DIAGNOSIS — R35.0 BENIGN PROSTATIC HYPERPLASIA WITH URINARY FREQUENCY: ICD-10-CM

## 2021-06-28 PROCEDURE — 99215 OFFICE O/P EST HI 40 MIN: CPT | Performed by: UROLOGY

## 2021-06-28 RX ORDER — LEVOTHYROXINE SODIUM 0.05 MG/1
TABLET ORAL
Qty: 90 TABLET | Refills: 0 | Status: SHIPPED | OUTPATIENT
Start: 2021-06-28 | End: 2021-09-24
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
## 2021-06-28 NOTE — PATIENT INSTRUCTIONS
Peter Mejia M.D.    1.  Continue tamsulosin 0.4 mg daily    2.   MRI of abdomen (attention to kidneys and liver) with and without IV gadolinium--             To investigate suspicious lesions seen in left kidney and al

## 2021-06-29 ENCOUNTER — TELEPHONE (OUTPATIENT)
Dept: SURGERY | Facility: CLINIC | Age: 86
End: 2021-06-29

## 2021-06-29 NOTE — TELEPHONE ENCOUNTER
Per Cecy Grants, pt's daughter, would like to speak to [facility] to reschedule appt, wanting sooner date.  Please advise

## 2021-06-29 NOTE — TELEPHONE ENCOUNTER
Spoke with patients daughter Ayaka Yee. Assisted in scheduling sooner appointment with  in [address] [place] [address]. Patients daughter confirmed and verbalized understanding. Patients daughter wondering who will call her father based on results.  Advised her that MRI
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
## 2021-07-07 ENCOUNTER — HOSPITAL ENCOUNTER (OUTPATIENT)
Dept: MRI IMAGING | Facility: HOSPITAL | Age: 86
Discharge: HOME | End: 2021-07-07
Attending: UROLOGY
Payer: MEDICARE

## 2021-07-07 DIAGNOSIS — R93.5 ABNORMAL CT OF THE ABDOMEN: ICD-10-CM

## 2021-07-07 DIAGNOSIS — D49.519 RENAL NEOPLASM: ICD-10-CM

## 2021-07-07 DIAGNOSIS — K76.9 LIVER LESION: ICD-10-CM

## 2021-07-07 PROCEDURE — A9575 INJ GADOTERATE MEGLUMI 0.1ML: HCPCS | Performed by: UROLOGY

## 2021-07-07 PROCEDURE — 74183 MRI ABD W/O CNTR FLWD CNTR: CPT | Performed by: UROLOGY

## 2021-07-19 ENCOUNTER — TELEPHONE (OUTPATIENT)
Dept: RADIATION ONCOLOGY | Facility: HOSPITAL | Age: 86
End: 2021-07-19

## 2021-07-19 ENCOUNTER — HOSPITAL ENCOUNTER (OUTPATIENT)
Dept: GENERAL RADIOLOGY | Age: 86
Discharge: HOME | End: 2021-07-19
Attending: UROLOGY
Payer: MEDICARE

## 2021-07-19 ENCOUNTER — OFFICE VISIT (OUTPATIENT)
Dept: SURGERY | Facility: CLINIC | Age: 86
End: 2021-07-19
Payer: MEDICARE

## 2021-07-19 VITALS — SYSTOLIC BLOOD PRESSURE: 132 MMHG | DIASTOLIC BLOOD PRESSURE: 64 MMHG

## 2021-07-19 DIAGNOSIS — N28.89 LEFT RENAL MASS: Primary | ICD-10-CM

## 2021-07-19 DIAGNOSIS — D49.519 RENAL NEOPLASM: ICD-10-CM

## 2021-07-19 DIAGNOSIS — N28.89 LEFT RENAL MASS: ICD-10-CM

## 2021-07-19 PROCEDURE — 71046 X-RAY EXAM CHEST 2 VIEWS: CPT | Performed by: UROLOGY

## 2021-07-19 PROCEDURE — 99215 OFFICE O/P EST HI 40 MIN: CPT | Performed by: UROLOGY

## 2021-07-19 NOTE — TELEPHONE ENCOUNTER
Nikolai Greenberg is calling for her Father Ho Arias who has saw Dr. Eleuterio Isbell before and he is calling to inquire about [employer] and they were wandering if this will help with his Kidney Cancer. Please call Nikolai Greenberg at [phone] to discuss his options.  The mass on his left

## 2021-07-19 NOTE — PROGRESS NOTES
[address] Urology  Initial Office Consultation    HPI:   Liza Das is a 80year old male here today for consultation at the request of, and a copy of this note will be sent to, Forrest Tay MD, and Jessica Mesa MD. he is accompanied by hi HISTORY: Prostate cancer, hypertension, hypothyroidism, TIA/seizures (last seizure 2018). PAST SURGICAL HISTORY: Open cholecystectomy through upper midline incision. Repair of ventral incisional hernia with mesh.   Open appendectomy, eye surgery, left k 9/3/2020   BUN     7 - 18 mg/dL 20 (H) 30 (H) 23 (H) 21 (H)   CREATININE     0.70 - 1.30 mg/dL 0.95 1.36 (H) 1.54 (H) 1.38 (H)         IMAGING:    MRI ABDOMEN (W+WO) (7/8/2021): 1.  There is an enhancing solid mass posterior mid upper pole left kidney measu in the renal fossae. 2.  Prostate enlargement. Prostate deforms the base the urinary bladder. There are postprocedural changes in the prostate. 3.  Indeterminate hypoenhancing focus in the inferior right hepatic lobe measuring 16 mm in diameter.   It coul carcinoma. These include surgery, immunotherapy, chemotherapy, multimodal therapy and/or observation / surveillance.  Each of these strategies and the medical rationale as well as the risks and benefits were discussed in detail.       Regarding observation, transfusion, adjacent organ involvement or injury. They were also made aware that their overall kidney function may be impaired and that their future renal reserve will be less.  We reviewed the risks for immediate or delayed, temporary or permanent dialysi in-situ and ordinarily decreases in size over several years.  The risk of re-treatment was reviewed as were other risks and benefits of ablative techniques.     Although patient has a good performance status, I believe that benefits of nephrectomy (partial with his family members and inform us of his final treatment intentions.     All questions answered. PLAN:  1. Patient will consider management options including watchful waiting, active surveillance, thermal ablation, or nephrectomy.     2. Referral to

## 2021-07-20 NOTE — TELEPHONE ENCOUNTER
[facility] Father, she need to schedule a Consult for Radiation appt for 7/30/21 with Dr. Emy Rubin, per Juancho Neal and Dr. Emy Rubin.

## 2021-07-23 ENCOUNTER — PATIENT MESSAGE (OUTPATIENT)
Dept: RADIATION ONCOLOGY | Facility: HOSPITAL | Age: 86
End: 2021-07-23

## 2021-07-30 ENCOUNTER — APPOINTMENT (OUTPATIENT)
Dept: RADIATION ONCOLOGY | Facility: HOSPITAL | Age: 86
End: 2021-07-30
Attending: RADIOLOGY
Payer: MEDICARE

## 2021-08-06 ENCOUNTER — APPOINTMENT (OUTPATIENT)
Dept: RADIATION ONCOLOGY | Facility: HOSPITAL | Age: 86
End: 2021-08-06
Attending: RADIOLOGY
Payer: MEDICARE

## 2021-08-13 ENCOUNTER — ANESTHESIA (OUTPATIENT)
Dept: CT IMAGING | Facility: HOSPITAL | Age: 86
End: 2021-08-13
Payer: MEDICARE

## 2021-08-13 ENCOUNTER — ANESTHESIA EVENT (OUTPATIENT)
Dept: CT IMAGING | Facility: HOSPITAL | Age: 86
End: 2021-08-13
Payer: MEDICARE

## 2021-08-13 ENCOUNTER — HOSPITAL ENCOUNTER (OUTPATIENT)
Dept: CT IMAGING | Facility: HOSPITAL | Age: 86
Discharge: HOME | End: 2021-08-13
Attending: RADIOLOGY
Payer: MEDICARE

## 2021-08-13 VITALS
OXYGEN SATURATION: 97 % | BODY MASS INDEX: 29.77 KG/M2 | HEIGHT: 69 IN | RESPIRATION RATE: 20 BRPM | WEIGHT: 201 LBS | HEART RATE: 58 BPM | DIASTOLIC BLOOD PRESSURE: 58 MMHG | TEMPERATURE: 97 F | SYSTOLIC BLOOD PRESSURE: 143 MMHG

## 2021-08-13 DIAGNOSIS — C64.9 RENAL CELL CARCINOMA (HCC): ICD-10-CM

## 2021-08-13 LAB
DEPRECATED RDW RBC AUTO: 39.8 FL (ref 35.1–46.3)
ERYTHROCYTE [DISTWIDTH] IN BLOOD BY AUTOMATED COUNT: 12.1 % (ref 11–15)
HCT VFR BLD AUTO: 41.7 %
HGB BLD-MCNC: 13.9 G/DL
MCH RBC QN AUTO: 29.5 PG (ref 26–34)
MCHC RBC AUTO-ENTMCNC: 33.3 G/DL (ref 31–37)
MCV RBC AUTO: 88.5 FL
PLATELET # BLD AUTO: 171 10(3)UL (ref 150–450)
RBC # BLD AUTO: 4.71 X10(6)UL
WBC # BLD AUTO: 5.2 X10(3) UL (ref 4–11)

## 2021-08-13 PROCEDURE — 85027 COMPLETE CBC AUTOMATED: CPT | Performed by: RADIOLOGY

## 2021-08-13 PROCEDURE — 77013 CT GUIDE FOR TISSUE ABLATION: CPT | Performed by: RADIOLOGY

## 2021-08-13 PROCEDURE — 36415 COLL VENOUS BLD VENIPUNCTURE: CPT | Performed by: RADIOLOGY

## 2021-08-13 PROCEDURE — 88305 TISSUE EXAM BY PATHOLOGIST: CPT | Performed by: RADIOLOGY

## 2021-08-13 PROCEDURE — 50200 RENAL BIOPSY PERQ: CPT | Performed by: RADIOLOGY

## 2021-08-13 PROCEDURE — 50593 PERC CRYO ABLATE RENAL TUM: CPT | Performed by: RADIOLOGY

## 2021-08-13 PROCEDURE — 77012 CT SCAN FOR NEEDLE BIOPSY: CPT | Performed by: RADIOLOGY

## 2021-08-13 RX ORDER — HYDROMORPHONE HYDROCHLORIDE 1 MG/ML
0.6 INJECTION, SOLUTION INTRAMUSCULAR; INTRAVENOUS; SUBCUTANEOUS EVERY 5 MIN PRN
Status: DISCONTINUED | OUTPATIENT
Start: 2021-08-13 | End: 2021-08-15

## 2021-08-13 RX ORDER — NALOXONE HYDROCHLORIDE 0.4 MG/ML
80 INJECTION, SOLUTION INTRAMUSCULAR; INTRAVENOUS; SUBCUTANEOUS AS NEEDED
Status: DISCONTINUED | OUTPATIENT
Start: 2021-08-13 | End: 2021-08-14

## 2021-08-13 RX ORDER — MORPHINE SULFATE 4 MG/ML
2 INJECTION, SOLUTION INTRAMUSCULAR; INTRAVENOUS EVERY 10 MIN PRN
Status: DISCONTINUED | OUTPATIENT
Start: 2021-08-13 | End: 2021-08-15

## 2021-08-13 RX ORDER — MORPHINE SULFATE 10 MG/ML
6 INJECTION, SOLUTION INTRAMUSCULAR; INTRAVENOUS EVERY 10 MIN PRN
Status: DISCONTINUED | OUTPATIENT
Start: 2021-08-13 | End: 2021-08-15

## 2021-08-13 RX ORDER — ONDANSETRON 2 MG/ML
INJECTION INTRAMUSCULAR; INTRAVENOUS AS NEEDED
Status: DISCONTINUED | OUTPATIENT
Start: 2021-08-13 | End: 2021-08-13 | Stop reason: SURG

## 2021-08-13 RX ORDER — ROCURONIUM BROMIDE 10 MG/ML
INJECTION, SOLUTION INTRAVENOUS AS NEEDED
Status: DISCONTINUED | OUTPATIENT
Start: 2021-08-13 | End: 2021-08-13 | Stop reason: SURG

## 2021-08-13 RX ORDER — LIDOCAINE HYDROCHLORIDE 10 MG/ML
INJECTION, SOLUTION EPIDURAL; INFILTRATION; INTRACAUDAL; PERINEURAL AS NEEDED
Status: DISCONTINUED | OUTPATIENT
Start: 2021-08-13 | End: 2021-08-13 | Stop reason: SURG

## 2021-08-13 RX ORDER — NEOSTIGMINE METHYLSULFATE 1 MG/ML
INJECTION INTRAVENOUS AS NEEDED
Status: DISCONTINUED | OUTPATIENT
Start: 2021-08-13 | End: 2021-08-13 | Stop reason: SURG

## 2021-08-13 RX ORDER — SODIUM CHLORIDE, SODIUM LACTATE, POTASSIUM CHLORIDE, CALCIUM CHLORIDE 600; 310; 30; 20 MG/100ML; MG/100ML; MG/100ML; MG/100ML
INJECTION, SOLUTION INTRAVENOUS CONTINUOUS
Status: DISCONTINUED | OUTPATIENT
Start: 2021-08-13 | End: 2021-08-15

## 2021-08-13 RX ORDER — EPHEDRINE SULFATE 50 MG/ML
INJECTION, SOLUTION INTRAVENOUS AS NEEDED
Status: DISCONTINUED | OUTPATIENT
Start: 2021-08-13 | End: 2021-08-13 | Stop reason: SURG

## 2021-08-13 RX ORDER — HYDROCODONE BITARTRATE AND ACETAMINOPHEN 5; 325 MG/1; MG/1
2 TABLET ORAL AS NEEDED
Status: DISCONTINUED | OUTPATIENT
Start: 2021-08-13 | End: 2021-08-15

## 2021-08-13 RX ORDER — MORPHINE SULFATE 4 MG/ML
4 INJECTION, SOLUTION INTRAMUSCULAR; INTRAVENOUS EVERY 10 MIN PRN
Status: DISCONTINUED | OUTPATIENT
Start: 2021-08-13 | End: 2021-08-15

## 2021-08-13 RX ORDER — PROCHLORPERAZINE EDISYLATE 5 MG/ML
5 INJECTION INTRAMUSCULAR; INTRAVENOUS ONCE AS NEEDED
Status: ACTIVE | OUTPATIENT
Start: 2021-08-13 | End: 2021-08-13

## 2021-08-13 RX ORDER — ONDANSETRON 2 MG/ML
4 INJECTION INTRAMUSCULAR; INTRAVENOUS ONCE AS NEEDED
Status: ACTIVE | OUTPATIENT
Start: 2021-08-13 | End: 2021-08-13

## 2021-08-13 RX ORDER — HYDROMORPHONE HYDROCHLORIDE 1 MG/ML
0.4 INJECTION, SOLUTION INTRAMUSCULAR; INTRAVENOUS; SUBCUTANEOUS EVERY 5 MIN PRN
Status: DISCONTINUED | OUTPATIENT
Start: 2021-08-13 | End: 2021-08-15

## 2021-08-13 RX ORDER — DEXAMETHASONE SODIUM PHOSPHATE 4 MG/ML
VIAL (ML) INJECTION AS NEEDED
Status: DISCONTINUED | OUTPATIENT
Start: 2021-08-13 | End: 2021-08-13 | Stop reason: SURG

## 2021-08-13 RX ORDER — HYDROMORPHONE HYDROCHLORIDE 1 MG/ML
0.2 INJECTION, SOLUTION INTRAMUSCULAR; INTRAVENOUS; SUBCUTANEOUS EVERY 5 MIN PRN
Status: DISCONTINUED | OUTPATIENT
Start: 2021-08-13 | End: 2021-08-15

## 2021-08-13 RX ORDER — HYDROCODONE BITARTRATE AND ACETAMINOPHEN 5; 325 MG/1; MG/1
1 TABLET ORAL AS NEEDED
Status: DISCONTINUED | OUTPATIENT
Start: 2021-08-13 | End: 2021-08-15

## 2021-08-13 RX ORDER — HYDROCODONE BITARTRATE AND ACETAMINOPHEN 5; 325 MG/1; MG/1
1-2 TABLET ORAL EVERY 4 HOURS PRN
Qty: 20 TABLET | Refills: 0 | Status: SHIPPED | OUTPATIENT
Start: 2021-08-13 | End: 2021-09-09 | Stop reason: ALTCHOICE

## 2021-08-13 RX ORDER — GLYCOPYRROLATE 0.2 MG/ML
INJECTION, SOLUTION INTRAMUSCULAR; INTRAVENOUS AS NEEDED
Status: DISCONTINUED | OUTPATIENT
Start: 2021-08-13 | End: 2021-08-13 | Stop reason: SURG

## 2021-08-13 RX ORDER — ACETAMINOPHEN 500 MG
1000 TABLET ORAL ONCE
Status: COMPLETED | OUTPATIENT
Start: 2021-08-13 | End: 2021-08-13

## 2021-08-13 RX ORDER — HALOPERIDOL 5 MG/ML
0.25 INJECTION INTRAMUSCULAR ONCE AS NEEDED
Status: ACTIVE | OUTPATIENT
Start: 2021-08-13 | End: 2021-08-13

## 2021-08-13 RX ADMIN — SODIUM CHLORIDE, SODIUM LACTATE, POTASSIUM CHLORIDE, CALCIUM CHLORIDE: 600; 310; 30; 20 INJECTION, SOLUTION INTRAVENOUS at 13:24:00

## 2021-08-13 RX ADMIN — SODIUM CHLORIDE, SODIUM LACTATE, POTASSIUM CHLORIDE, CALCIUM CHLORIDE: 600; 310; 30; 20 INJECTION, SOLUTION INTRAVENOUS at 12:33:00

## 2021-08-13 RX ADMIN — ROCURONIUM BROMIDE 20 MG: 10 INJECTION, SOLUTION INTRAVENOUS at 14:27:00

## 2021-08-13 RX ADMIN — ROCURONIUM BROMIDE 30 MG: 10 INJECTION, SOLUTION INTRAVENOUS at 13:30:00

## 2021-08-13 RX ADMIN — EPHEDRINE SULFATE 5 MG: 50 INJECTION, SOLUTION INTRAVENOUS at 14:36:00

## 2021-08-13 RX ADMIN — DEXAMETHASONE SODIUM PHOSPHATE 4 MG: 4 MG/ML VIAL (ML) INJECTION at 14:16:00

## 2021-08-13 RX ADMIN — ONDANSETRON 4 MG: 2 INJECTION INTRAMUSCULAR; INTRAVENOUS at 14:16:00

## 2021-08-13 RX ADMIN — ACETAMINOPHEN 1000 MG: 500 MG TABLET ORAL at 12:29:00

## 2021-08-13 RX ADMIN — EPHEDRINE SULFATE 10 MG: 50 INJECTION, SOLUTION INTRAVENOUS at 14:39:00

## 2021-08-13 RX ADMIN — EPHEDRINE SULFATE 5 MG: 50 INJECTION, SOLUTION INTRAVENOUS at 14:03:00

## 2021-08-13 RX ADMIN — NEOSTIGMINE METHYLSULFATE 5 MG: 1 INJECTION INTRAVENOUS at 16:07:00

## 2021-08-13 RX ADMIN — GLYCOPYRROLATE 0.6 MG: 0.2 INJECTION, SOLUTION INTRAMUSCULAR; INTRAVENOUS at 16:07:00

## 2021-08-13 RX ADMIN — LIDOCAINE HYDROCHLORIDE 50 MG: 10 INJECTION, SOLUTION EPIDURAL; INFILTRATION; INTRACAUDAL; PERINEURAL at 13:30:00

## 2021-08-13 NOTE — ANESTHESIA PREPROCEDURE EVALUATION
Anesthesia PreOp Note    HPI:     Jewell Salcido is a 80year old male who presents for preoperative consultation requested by: * No surgeons listed *    Date of Surgery: 8/13/2021    * No procedures listed *  Indication: * No pre-op diagnosis entered * 2010    Per NG: Macular Puckering with CME   • VITRECTOMY,MECHANICAL Left 9/1/10    for macular pucker with CME with Dr. Rigoberto Kiser   • Zohreh Right 5/18/16    Dr. Rigoberto Kiser       (Not in a hospital admission)    LEVOTHYROXINE SODIUM 50 MCG O  Service: Not Asked        Blood Transfusions: Not Asked        Caffeine Concern: Yes          1-2 cups coffee per day        Occupational Exposure: Not Asked        Hobby Hazards: Not Asked        Sleep Concern: Not Asked        Stress Concern: No 08/13/2021    MCHC 33.3 08/13/2021    RDW 12.1 08/13/2021    .0 08/13/2021     Lab Results   Component Value Date     06/21/2021    K 4.0 06/21/2021     06/21/2021    CO2 29.0 06/21/2021    BUN 20 (H) 06/21/2021    CREATSERUM 0.95 06/21/

## 2021-08-13 NOTE — PROCEDURES
[facility]  Procedure Note    Charmain Prader Patient Status:  Outpatient    1929 MRN [id number]   Location [facility] Attending Mika Gu MD   Hosp Day # 0 PCP Carlos Manuel Romero MD     Procedure: CT guided cryoabla

## 2021-08-13 NOTE — ANESTHESIA PROCEDURE NOTES
Airway  Date/Time: 8/13/2021 1:30 PM  Urgency: Elective      General Information and Staff    Patient location during procedure: OR  Anesthesiologist: Dennie Leriche, MD  Resident/CRNA: Duane Prescott CRNA  Performed: CRNA     Indications and Patient Condi

## 2021-08-13 NOTE — ANESTHESIA POSTPROCEDURE EVALUATION
Patient: Saundra Ortiz    Procedure Summary     Date: 08/13/21 Room / Location: [facility]; [facility]; [facility] Anesthesia Care Unit    Anesthesia Start: 9117 Anesthesia Stop:     Procedure: CT CRYOABLATION RENAL LISETTE

## 2021-08-30 RX ORDER — LISINOPRIL 20 MG/1
20 TABLET ORAL DAILY
Qty: 90 TABLET | Refills: 1 | Status: SHIPPED | OUTPATIENT
Start: 2021-08-30 | End: 2022-02-23

## 2021-08-30 NOTE — TELEPHONE ENCOUNTER
Refill passed per [place], [facility] protocol. Requested Prescriptions   Pending Prescriptions Disp Refills    LISINOPRIL 20 MG Oral Tab [Pharmacy Med Name: Lisinopril 20 Mg Tab Solc] 90 tablet 0     Sig: Take 1 tablet (20 mg total) by mouth daily.         Hypertensive Medications Protocol Passed - 8/30/2021 10:09 AM        Passed - CMP or BMP in past 12 months        Passed - Appointment in past 6 or next 3 months        Passed - GFR Non- > 50     Lab Results   Component Value Date    GFRNAA 79 06/21/2021                     Future Appointments         Provider Department Appt Notes    In 2 weeks [facility] MRI RM1 (1.5T) [facility] MRI     In 4 weeks Jeff Jiménez MD [facility] Ophthalmology ep ee    In 2 months Jordy Rosales MD [facility] F/U    In 6 months Alba Cheek [address] [place] [address], [address] [facility],[address] Floor, [facility]            Recent Outpatient Visits              1 month ago Left renal mass    [address]  Urology Terrell Laboy MD    Office Visit    2 months ago Renal neoplasm    [facility] Denise [facility]    Office Visit    2 months ago Abnormal CT scan, kidney    Segundo Teixeira MD    Office Visit    5 months ago Prostate cancer [facility])    [facility] Den[facility]    Office Visit    6 months ago Encounter for vaccination    Immediate [facility], [address] [place] [address]    Nurse Only

## 2021-09-13 ENCOUNTER — HOSPITAL ENCOUNTER (OUTPATIENT)
Dept: MRI IMAGING | Facility: HOSPITAL | Age: 86
Discharge: HOME | End: 2021-09-13
Attending: RADIOLOGY
Payer: MEDICARE

## 2021-09-13 DIAGNOSIS — C64.9 RENAL CELL CARCINOMA (HCC): ICD-10-CM

## 2021-09-13 LAB — CREAT BLD-MCNC: 1.3 MG/DL

## 2021-09-13 PROCEDURE — 82565 ASSAY OF CREATININE: CPT

## 2021-09-13 PROCEDURE — 74183 MRI ABD W/O CNTR FLWD CNTR: CPT | Performed by: RADIOLOGY

## 2021-09-13 PROCEDURE — A9575 INJ GADOTERATE MEGLUMI 0.1ML: HCPCS | Performed by: RADIOLOGY

## 2021-09-17 DIAGNOSIS — C64.9 RENAL CELL CARCINOMA (HCC): Primary | ICD-10-CM

## 2021-09-24 RX ORDER — LEVOTHYROXINE SODIUM 0.05 MG/1
50 TABLET ORAL
Qty: 90 TABLET | Refills: 1 | Status: SHIPPED | OUTPATIENT
Start: 2021-09-24 | End: 2022-06-28

## 2021-09-24 NOTE — TELEPHONE ENCOUNTER
Refill passed per [employer] [place], [facility] protocol.     Requested Prescriptions   Pending Prescriptions Disp Refills    LEVOTHYROXINE 50 MCG Oral Tab [Pharmacy Med Name: Levothyroxine Sodium 50 Mcg Tab Lupi] 90 tablet 0     Sig: Take one tablet by mouth every morning before breakfast        Thyroid Medication Protocol Passed - 9/24/2021  1:31 AM        Passed - TSH in past 12 months        Passed - Last TSH value is normal     Lab Results   Component Value Date    TSH 1.210 03/01/2021                 Passed - Appointment in past 12 or next 3 months              Recent Outpatient Visits              2 months ago Left renal mass    [facility] Kelly Mehta MD    Office Visit    2 months ago Renal neoplasm    [facility], [address], Siobhan Jordan MD    Office Visit    3 months ago Abnormal CT scan, kidney    [facility], [facility], [address] MD    Office Visit    6 months ago Prostate cancer [facility])    [facility], [address], Siobhan Jordan MD    Office Visit    7 months ago Encounter for vaccination    Immediate [facility], [address] [place] [address]    Nurse Only            Future Appointments         Provider Department Appt Notes    In 4 days Walter Rivera MD [facility] Ophthalmology ep ee    In 1 month Rhina Catalan MD [facility] F/U    In 5 months César Farfan MD [facility], [address], [facility]
Med 3

## 2021-09-28 ENCOUNTER — OFFICE VISIT (OUTPATIENT)
Dept: OPHTHALMOLOGY | Facility: CLINIC | Age: 86
End: 2021-09-28
Payer: MEDICARE

## 2021-09-28 DIAGNOSIS — H43.391 VITREOUS FLOATERS OF RIGHT EYE: Primary | ICD-10-CM

## 2021-09-28 DIAGNOSIS — H35.30 ARMD (AGE-RELATED MACULAR DEGENERATION), BILATERAL: ICD-10-CM

## 2021-09-28 DIAGNOSIS — Z96.1 PSEUDOPHAKIA OF BOTH EYES: ICD-10-CM

## 2021-09-28 PROCEDURE — 92014 COMPRE OPH EXAM EST PT 1/>: CPT | Performed by: OPHTHALMOLOGY

## 2021-09-28 NOTE — PROGRESS NOTES
Bravo Stuart is a 80year old male. HPI:     HPI     Patient is here for a complete exam. He was last seen by Dr. Rd Major on 9/10/21 and was supposed to return in one week for shots both eyes. Pt states he will follow with Dr Rd Major monthly.  Pt used    Alcohol use: No      Alcohol/week: 0.0 standard drinks    Drug use: No      Medications:  Current Outpatient Medications   Medication Sig Dispense Refill   • levothyroxine 50 MCG Oral Tab Take 1 tablet (50 mcg total) by mouth before breakfast. 90 t Wearing Rx       Sphere Cylinder Axis Add    Right -0.75 Sphere  3.00    Left -1.25 +1.00 160 3.00    Type: Flat top bifocal          Manifest Refraction    Pt declines refraction today, wants to wait to finish treatment with Dr Kristyn Dial

## 2021-09-28 NOTE — PATIENT INSTRUCTIONS
ARMD (age-related macular degeneration), bilateral  Continue to follow up with Dr. Keshia Ruvalcaba for treatment. He last saw him on 9/10/21 and is following up there monthly. Pseudophakia of both eyes  No treatment.      Vitreous floaters of right eye  No

## 2021-09-28 NOTE — ASSESSMENT & PLAN NOTE
Continue to follow up with Dr. Storm Cabrera for treatment. He last saw him on 9/10/21 and is following up there monthly. Information given on [facility] if patient is interested.
No treatment.
No treatment.
50M with significant PMHx of ESRD on HD  (no bx, since 2013 with Dr. 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 Leigh, followed by Dr. Raphael, [facility] Oncologist), RA with hand contractures, presents for potential DDRT.    -full labs  -CXR/EKG  -OR 12p  -Steroids/Simulect induction/Ancef for ABX PPx  -NPO

## 2021-10-14 NOTE — TELEPHONE ENCOUNTER
Pt calling states he doesn't want to come in office with virus going on and running out of medication please advise none

## 2021-10-25 ENCOUNTER — PATIENT MESSAGE (OUTPATIENT)
Dept: FAMILY MEDICINE CLINIC | Facility: CLINIC | Age: 86
End: 2021-10-25

## 2021-10-25 NOTE — TELEPHONE ENCOUNTER
From: Trini Mar  To: Asad Suero MD  Sent: 10/25/2021 9:17 AM CDT  Subject: Ramona Edward    Dear Dr. Asmita Cabrera,  My father is eligible for his Covid booster this month would he be able to get it at your office? He had the Moderna vaccine.  Also, he wo

## 2021-10-30 ENCOUNTER — IMMUNIZATION (OUTPATIENT)
Dept: LAB | Facility: HOSPITAL | Age: 86
End: 2021-10-30
Attending: EMERGENCY MEDICINE
Payer: MEDICARE

## 2021-10-30 DIAGNOSIS — Z23 NEED FOR VACCINATION: Primary | ICD-10-CM

## 2021-10-30 PROCEDURE — 0064A SARSCOV2 VAC 50MCG/0.25ML IM: CPT

## 2021-11-11 ENCOUNTER — OFFICE VISIT (OUTPATIENT)
Dept: NEUROLOGY | Facility: CLINIC | Age: 86
End: 2021-11-11
Payer: MEDICARE

## 2021-11-11 VITALS
SYSTOLIC BLOOD PRESSURE: 122 MMHG | HEIGHT: 69 IN | BODY MASS INDEX: 29.77 KG/M2 | WEIGHT: 201 LBS | DIASTOLIC BLOOD PRESSURE: 76 MMHG

## 2021-11-11 DIAGNOSIS — G40.909 SEIZURE DISORDER (HCC): ICD-10-CM

## 2021-11-11 PROCEDURE — 99213 OFFICE O/P EST LOW 20 MIN: CPT | Performed by: OTHER

## 2021-11-11 RX ORDER — DIVALPROEX SODIUM 500 MG/1
500 TABLET, EXTENDED RELEASE ORAL DAILY
Qty: 90 TABLET | Refills: 3 | Status: SHIPPED | OUTPATIENT
Start: 2021-11-11

## 2021-11-11 RX ORDER — LEVETIRACETAM 750 MG/1
750 TABLET ORAL 2 TIMES DAILY
Qty: 180 TABLET | Refills: 3 | Status: SHIPPED | OUTPATIENT
Start: 2021-11-11

## 2021-11-11 NOTE — PROGRESS NOTES
Mr. Lynda Eid was in the office with his daughter. No seizures over the last year. Denies headache, neck pain, low back pain. Daughter is not aware of any memory, cognitive issues. No falls. Lives by himself.     New medical issues over the last year–ma

## 2022-01-20 ENCOUNTER — OFFICE VISIT (OUTPATIENT)
Dept: FAMILY MEDICINE CLINIC | Facility: CLINIC | Age: 87
End: 2022-01-20
Payer: MEDICARE

## 2022-01-20 VITALS
DIASTOLIC BLOOD PRESSURE: 79 MMHG | SYSTOLIC BLOOD PRESSURE: 166 MMHG | HEART RATE: 66 BPM | BODY MASS INDEX: 30.71 KG/M2 | WEIGHT: 205 LBS | HEIGHT: 68.5 IN

## 2022-01-20 DIAGNOSIS — I10 PRIMARY HYPERTENSION: ICD-10-CM

## 2022-01-20 DIAGNOSIS — G40.909 SEIZURE DISORDER (HCC): ICD-10-CM

## 2022-01-20 DIAGNOSIS — C64.9 RENAL CELL CARCINOMA, UNSPECIFIED LATERALITY (HCC): ICD-10-CM

## 2022-01-20 DIAGNOSIS — H90.3 BILATERAL SENSORINEURAL HEARING LOSS: ICD-10-CM

## 2022-01-20 DIAGNOSIS — R56.9 SEIZURE (HCC): ICD-10-CM

## 2022-01-20 DIAGNOSIS — C61 PROSTATE CANCER (HCC): ICD-10-CM

## 2022-01-20 DIAGNOSIS — Z00.00 ENCOUNTER FOR ANNUAL HEALTH EXAMINATION: ICD-10-CM

## 2022-01-20 DIAGNOSIS — Z00.00 ROUTINE PHYSICAL EXAMINATION: ICD-10-CM

## 2022-01-20 DIAGNOSIS — H35.30 ARMD (AGE-RELATED MACULAR DEGENERATION), BILATERAL: Primary | ICD-10-CM

## 2022-01-20 LAB
CARTRIDGE EXPIRATION DATE: NORMAL DATE
HEMOGLOBIN A1C: 5.4 % (ref 4.3–5.6)

## 2022-01-20 PROCEDURE — G0439 PPPS, SUBSEQ VISIT: HCPCS | Performed by: FAMILY MEDICINE

## 2022-01-20 PROCEDURE — 90662 IIV NO PRSV INCREASED AG IM: CPT | Performed by: FAMILY MEDICINE

## 2022-01-20 PROCEDURE — G0008 ADMIN INFLUENZA VIRUS VAC: HCPCS | Performed by: FAMILY MEDICINE

## 2022-01-20 PROCEDURE — 99213 OFFICE O/P EST LOW 20 MIN: CPT | Performed by: FAMILY MEDICINE

## 2022-01-20 PROCEDURE — 83036 HEMOGLOBIN GLYCOSYLATED A1C: CPT | Performed by: FAMILY MEDICINE

## 2022-01-20 NOTE — PROGRESS NOTES
Subjective:   Leia Suazo is a 80year old male who presents for a Medicare Subsequent Annual Wellness visit (Pt already had Initial Annual Wellness). History/Other:   Fall Risk Assessment: He has been screened for Falls and is low risk.       Cog by mouth daily.         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), H/O prostate biopsy (2011), Hearing impairment, High cholesterol, Hyperlipidemia, Macular degeneration, Prostate cancer ([facility] [address] Karlos Lusty night nocturia, no complaint of urinary incontinence  MUSCULOSKELETAL: denies back pain  NEURO: denies headaches  PSYCHE: denies depression or anxiety  HEMATOLOGIC: denies hx of anemia  ENDOCRINE: denies thyroid history  ALL/ASTHMA: denies hx of allergy or over the telephone: Yes I have trouble following the conversations when two or more people are talking at the same time: Yes   I have trouble understanding things on the TV: Yes I have to strain to understand conversations: Yes   I have to worry about miss without trying?: 2 - No  Has your appetite been poor?: No  Type of Diet: Balanced  How does the patient maintain a good energy level?: Appropriate Exercise  How would you describe your daily physical activity?: Light  How would you describe your current he following risk factors   • Men who are 73-68 years old and have ever smoked   • Anyone with a family history -     Colorectal Cancer Screening  Covered for ages 52-80; only need ONE of the following:    Colonoscopy   Covered every 10 years    Covered every

## 2022-01-20 NOTE — PATIENT INSTRUCTIONS
[address] SCHEDULE   Tests on this list are recommended by your physician but may not be covered, or covered at this frequency, by your insurer. Please check with your insurance carrier before scheduling to verify coverage.    Alene Blizzard Each vaccine ([id number] & [id number]) covered once after 65 Prevnar 13: 06/07/2019    [id number]: 06/26/2020     No recommendations at this time    Hepatitis B One screening covered for patients with certain risk factors   -  No recommendations at this t

## 2022-02-05 ENCOUNTER — LAB ENCOUNTER (OUTPATIENT)
Dept: LAB | Facility: HOSPITAL | Age: 87
End: 2022-02-05
Attending: UROLOGY
Payer: MEDICARE

## 2022-02-05 DIAGNOSIS — R35.1 NOCTURIA: ICD-10-CM

## 2022-02-05 DIAGNOSIS — C61 PROSTATE CANCER (HCC): ICD-10-CM

## 2022-02-05 LAB
BILIRUB UR QL: NEGATIVE
COLOR UR: YELLOW
GLUCOSE UR-MCNC: NEGATIVE MG/DL
HGB UR QL STRIP.AUTO: NEGATIVE
HYALINE CASTS #/AREA URNS AUTO: PRESENT /LPF
LEUKOCYTE ESTERASE UR QL STRIP.AUTO: NEGATIVE
NITRITE UR QL STRIP.AUTO: NEGATIVE
PH UR: 6 [PH] (ref 5–8)
PROT UR-MCNC: >=500 MG/DL
PSA SERPL-MCNC: 0.08 NG/ML (ref ?–4)
SP GR UR STRIP: 1.02 (ref 1–1.03)
UROBILINOGEN UR STRIP-ACNC: <2

## 2022-02-05 PROCEDURE — 36415 COLL VENOUS BLD VENIPUNCTURE: CPT

## 2022-02-05 PROCEDURE — 81001 URINALYSIS AUTO W/SCOPE: CPT

## 2022-02-05 PROCEDURE — 84153 ASSAY OF PSA TOTAL: CPT

## 2022-02-06 ENCOUNTER — PATIENT MESSAGE (OUTPATIENT)
Dept: FAMILY MEDICINE CLINIC | Facility: CLINIC | Age: 87
End: 2022-02-06

## 2022-02-07 NOTE — TELEPHONE ENCOUNTER
Routed to Dr Sangeeta Millard for advise, thanks. Also [employer] message with recommendation sent to pt. LOV 1-20-22. No future appointments.

## 2022-02-07 NOTE — TELEPHONE ENCOUNTER
From: Mary Jane Santos  To: Cornelio Rios MD  Sent: 2/6/2022 12:24 PM CST  Subject: [employer] Mel    Dear Dr. Kristal Colbert,  My father has been monitoring his blood pressure since we've last seen you. It continues to be high, on the 1st it was 172/80. Do you think his medicine needs to be adjusted? Please advise.     Papa Burdick ( daughter)

## 2022-02-08 NOTE — TELEPHONE ENCOUNTER
Dr. Aime Arriola:   Please send additional blood pressure medication to pharmacy  See My Chart message written on 2/7/22

## 2022-02-09 RX ORDER — METOPROLOL SUCCINATE 50 MG/1
50 TABLET, EXTENDED RELEASE ORAL DAILY
Qty: 90 TABLET | Refills: 1 | Status: SHIPPED | OUTPATIENT
Start: 2022-02-09 | End: 2023-02-04

## 2022-02-18 ENCOUNTER — TELEPHONE (OUTPATIENT)
Dept: SURGERY | Facility: CLINIC | Age: 87
End: 2022-02-18

## 2022-02-18 NOTE — TELEPHONE ENCOUNTER
S/W pt's dtr Nancy Dorman who has a signed MERLIN on file calling about an appt with IGLESIA since she informed that pt is not satisfied with PVK's care because pt had to see that he has cancer when he was reviewing the path reports on my chart. Pt felt hat he should have been called personally with those results. She also stated that as for [facility] pt was not happy with his insistence that pt choose the [place] option that he suggested and because pt was also insistent that he was interested in another option he finally agreed to give pt the names of 2 MD's one of which performed the surgery to remove the renal lesion. Pt still needs to have the second lesion addressed but because pt also has prostate cancer he would like to now see Dr. Seth Mosher for his prostate cancer care.  I gave the dtr an appt with IGLESIA for Tues 4/5 at 9:40 am.

## 2022-02-24 NOTE — TELEPHONE ENCOUNTER
Please review. Protocol failed or has no protocol. Requested Prescriptions   Pending Prescriptions Disp Refills    LISINOPRIL 20 MG Oral Tab [Pharmacy Med Name: Lisinopril 20 Mg Tab Solc] 90 tablet 0     Sig: Take 1 tablet (20 mg total) by mouth daily.         Hypertensive Medications Protocol Failed - 2/23/2022  1:33 AM        Failed - GFR Non- > 50     Lab Results   Component Value Date    GFRNAA 48 (L) 09/13/2021                 Passed - CMP or BMP in past 12 months        Passed - Appointment in past 6 or next 3 months              Recent Outpatient Visits              1 month ago ARMD (age-related macular degeneration), bilateral    Jay Tay MD    Office Visit    3 months ago Seizure disorder [facility] Elijah Lanes, MD    Office Visit    4 months ago Vitreous floaters of right eye    [facility] Estell Koyanagi, MD    Office Visit    7 months ago Left renal mass    [facility] Joanna Tineo MD    Office Visit    8 months ago Renal neoplasm    [facility], [address], Ami Morel MD    Office Visit            Future Appointments         Provider Department Appt Notes    In 1 week Evangelina Chow MD [address] Aisha Macario follow up- bp  policy informed    In 1 month Aldair Evangelista, [address] [place] [address], [address], [place] CON-prostate cancer

## 2022-02-26 RX ORDER — LISINOPRIL 20 MG/1
20 TABLET ORAL DAILY
Qty: 90 TABLET | Refills: 1 | Status: SHIPPED | OUTPATIENT
Start: 2022-02-26 | End: 2022-03-18

## 2022-03-02 ENCOUNTER — OFFICE VISIT (OUTPATIENT)
Dept: FAMILY MEDICINE CLINIC | Facility: CLINIC | Age: 87
End: 2022-03-02
Payer: MEDICARE

## 2022-03-02 VITALS
BODY MASS INDEX: 31.61 KG/M2 | DIASTOLIC BLOOD PRESSURE: 79 MMHG | HEART RATE: 56 BPM | HEIGHT: 68.5 IN | WEIGHT: 211 LBS | SYSTOLIC BLOOD PRESSURE: 195 MMHG

## 2022-03-02 DIAGNOSIS — I10 ESSENTIAL HYPERTENSION: Primary | ICD-10-CM

## 2022-03-02 PROCEDURE — 99213 OFFICE O/P EST LOW 20 MIN: CPT | Performed by: FAMILY MEDICINE

## 2022-03-02 RX ORDER — AMLODIPINE BESYLATE 5 MG/1
5 TABLET ORAL DAILY
Qty: 90 TABLET | Refills: 1 | Status: SHIPPED | OUTPATIENT
Start: 2022-03-02 | End: 2023-02-25

## 2022-04-05 ENCOUNTER — OFFICE VISIT (OUTPATIENT)
Dept: SURGERY | Facility: CLINIC | Age: 87
End: 2022-04-05
Payer: MEDICARE

## 2022-04-05 DIAGNOSIS — C61 PROSTATE CANCER (HCC): Primary | ICD-10-CM

## 2022-04-05 DIAGNOSIS — D49.519 RENAL NEOPLASM: ICD-10-CM

## 2022-04-05 PROCEDURE — 99214 OFFICE O/P EST MOD 30 MIN: CPT | Performed by: UROLOGY

## 2022-04-23 RX ORDER — LISINOPRIL 20 MG/1
20 TABLET ORAL 2 TIMES DAILY
Qty: 180 TABLET | Refills: 1 | Status: SHIPPED | OUTPATIENT
Start: 2022-04-23

## 2022-04-23 NOTE — TELEPHONE ENCOUNTER
Patient calling, identified name and , states needs to have his Lisinopril RX quantity adjusted;  needs the quantity to be 180 tablets plus  one refill  As dose was increased to twice a day        Allergies reviewed and pharmacy confirmed      Med pended for your review / approval

## 2022-05-06 NOTE — TELEPHONE ENCOUNTER
Please review.  Protocol Failed or has no Protocol  Requested Prescriptions   Pending Prescriptions Disp Refills    METOPROLOL SUCCINATE ER 50 MG Oral Tablet 24 Hr [Pharmacy Med Name: Metoprolol Succinate Er 24hr 50 Mg Tab Nort] 90 tablet 0     Sig: TAKE ONE TABLET BY MOUTH ONE TIME DAILY        Hypertensive Medications Protocol Failed - 5/4/2022  7:12 PM        Failed - GFR Non- > 50     Lab Results   Component Value Date    GFRNAA 48 (L) 09/13/2021                 Passed - CMP or BMP in past 12 months        Passed - Appointment in past 6 or next 3 months              Recent Outpatient Visits              1 month ago Prostate cancer [facility])    [facility] Jacmeredith Almaguer MD    Office Visit    2 months ago Essential hypertension    [facility], [facility], [address], Sandrita Zamudio MD    Office Visit    3 months ago ARMD (age-related macular degeneration), bilateral    Lety Kyle MD    Office Visit    5 months ago Seizure disorder [facility])    [facility] [address] Lynda Martin MD    Office Visit    7 months ago Vitreous floaters of right eye    [facility] Guillaume Sanders MD    Office Visit

## 2022-05-19 RX ORDER — METOPROLOL SUCCINATE 50 MG/1
TABLET, EXTENDED RELEASE ORAL
Qty: 90 TABLET | Refills: 0 | Status: SHIPPED | OUTPATIENT
Start: 2022-05-19

## 2022-06-28 RX ORDER — LEVOTHYROXINE SODIUM 0.05 MG/1
TABLET ORAL
Qty: 90 TABLET | Refills: 0 | Status: SHIPPED | OUTPATIENT
Start: 2022-06-28

## 2022-07-29 ENCOUNTER — OFFICE VISIT (OUTPATIENT)
Dept: FAMILY MEDICINE CLINIC | Facility: CLINIC | Age: 87
End: 2022-07-29
Payer: MEDICARE

## 2022-07-29 ENCOUNTER — LAB ENCOUNTER (OUTPATIENT)
Dept: LAB | Age: 87
End: 2022-07-29
Attending: FAMILY MEDICINE
Payer: MEDICARE

## 2022-07-29 VITALS
HEIGHT: 68.5 IN | SYSTOLIC BLOOD PRESSURE: 142 MMHG | DIASTOLIC BLOOD PRESSURE: 75 MMHG | HEART RATE: 49 BPM | WEIGHT: 219 LBS | BODY MASS INDEX: 32.81 KG/M2

## 2022-07-29 DIAGNOSIS — E56.9 HYPOVITAMINOSIS: ICD-10-CM

## 2022-07-29 DIAGNOSIS — E55.9 HYPOVITAMINOSIS D: ICD-10-CM

## 2022-07-29 DIAGNOSIS — R53.83 OTHER FATIGUE: ICD-10-CM

## 2022-07-29 DIAGNOSIS — L98.9 SKIN LESION: ICD-10-CM

## 2022-07-29 DIAGNOSIS — R53.83 OTHER FATIGUE: Primary | ICD-10-CM

## 2022-07-29 LAB
ALBUMIN SERPL-MCNC: 3.4 G/DL (ref 3.4–5)
ALBUMIN/GLOB SERPL: 1 {RATIO} (ref 1–2)
ALP LIVER SERPL-CCNC: 73 U/L
ALT SERPL-CCNC: 24 U/L
ANION GAP SERPL CALC-SCNC: 5 MMOL/L (ref 0–18)
AST SERPL-CCNC: 17 U/L (ref 15–37)
BASOPHILS # BLD AUTO: 0.03 X10(3) UL (ref 0–0.2)
BASOPHILS NFR BLD AUTO: 0.4 %
BILIRUB SERPL-MCNC: 0.5 MG/DL (ref 0.1–2)
BUN BLD-MCNC: 22 MG/DL (ref 7–18)
BUN/CREAT SERPL: 15.8 (ref 10–20)
CALCIUM BLD-MCNC: 9.6 MG/DL (ref 8.5–10.1)
CHLORIDE SERPL-SCNC: 108 MMOL/L (ref 98–112)
CO2 SERPL-SCNC: 30 MMOL/L (ref 21–32)
CREAT BLD-MCNC: 1.39 MG/DL
DEPRECATED RDW RBC AUTO: 40.9 FL (ref 35.1–46.3)
EOSINOPHIL # BLD AUTO: 0.17 X10(3) UL (ref 0–0.7)
EOSINOPHIL NFR BLD AUTO: 2.5 %
ERYTHROCYTE [DISTWIDTH] IN BLOOD BY AUTOMATED COUNT: 12.1 % (ref 11–15)
FASTING STATUS PATIENT QL REPORTED: NO
GLOBULIN PLAS-MCNC: 3.3 G/DL (ref 2.8–4.4)
GLUCOSE BLD-MCNC: 103 MG/DL (ref 70–99)
HCT VFR BLD AUTO: 44.9 %
HGB BLD-MCNC: 14.5 G/DL
IMM GRANULOCYTES # BLD AUTO: 0.02 X10(3) UL (ref 0–1)
IMM GRANULOCYTES NFR BLD: 0.3 %
LYMPHOCYTES # BLD AUTO: 1.35 X10(3) UL (ref 1–4)
LYMPHOCYTES NFR BLD AUTO: 20.1 %
MCH RBC QN AUTO: 29.8 PG (ref 26–34)
MCHC RBC AUTO-ENTMCNC: 32.3 G/DL (ref 31–37)
MCV RBC AUTO: 92.2 FL
MONOCYTES # BLD AUTO: 0.71 X10(3) UL (ref 0.1–1)
MONOCYTES NFR BLD AUTO: 10.6 %
NEUTROPHILS # BLD AUTO: 4.43 X10 (3) UL (ref 1.5–7.7)
NEUTROPHILS # BLD AUTO: 4.43 X10(3) UL (ref 1.5–7.7)
NEUTROPHILS NFR BLD AUTO: 66.1 %
OSMOLALITY SERPL CALC.SUM OF ELEC: 300 MOSM/KG (ref 275–295)
PLATELET # BLD AUTO: 221 10(3)UL (ref 150–450)
POTASSIUM SERPL-SCNC: 5.1 MMOL/L (ref 3.5–5.1)
PROT SERPL-MCNC: 6.7 G/DL (ref 6.4–8.2)
RBC # BLD AUTO: 4.87 X10(6)UL
SODIUM SERPL-SCNC: 143 MMOL/L (ref 136–145)
TSI SER-ACNC: 1.92 MIU/ML (ref 0.36–3.74)
VIT B12 SERPL-MCNC: 536 PG/ML (ref 193–986)
VIT D+METAB SERPL-MCNC: 23.9 NG/ML (ref 30–100)
WBC # BLD AUTO: 6.7 X10(3) UL (ref 4–11)

## 2022-07-29 PROCEDURE — 80053 COMPREHEN METABOLIC PANEL: CPT

## 2022-07-29 PROCEDURE — 82607 VITAMIN B-12: CPT

## 2022-07-29 PROCEDURE — 82306 VITAMIN D 25 HYDROXY: CPT

## 2022-07-29 PROCEDURE — 84443 ASSAY THYROID STIM HORMONE: CPT

## 2022-07-29 PROCEDURE — 85025 COMPLETE CBC W/AUTO DIFF WBC: CPT

## 2022-07-29 PROCEDURE — 36415 COLL VENOUS BLD VENIPUNCTURE: CPT

## 2022-07-29 PROCEDURE — 99214 OFFICE O/P EST MOD 30 MIN: CPT | Performed by: FAMILY MEDICINE

## 2022-07-29 RX ORDER — LISINOPRIL 10 MG/1
10 TABLET ORAL DAILY
Qty: 90 TABLET | Refills: 1 | Status: SHIPPED | OUTPATIENT
Start: 2022-07-29 | End: 2023-07-24

## 2022-07-30 ENCOUNTER — PATIENT MESSAGE (OUTPATIENT)
Dept: FAMILY MEDICINE CLINIC | Facility: CLINIC | Age: 87
End: 2022-07-30

## 2022-07-30 DIAGNOSIS — N28.9 RENAL INSUFFICIENCY: Primary | ICD-10-CM

## 2022-07-30 RX ORDER — METOPROLOL SUCCINATE 50 MG/1
TABLET, EXTENDED RELEASE ORAL
Qty: 90 TABLET | Refills: 0 | Status: SHIPPED | OUTPATIENT
Start: 2022-07-30

## 2022-07-30 NOTE — TELEPHONE ENCOUNTER
From: Jonathan Avila  To: Archie Morales MD  Sent: 7/30/2022 1:09 PM CDT  Subject: Erick Lo,  I managed to get my father to agree to get a hearing aid! We have an appointment on the 9th to go to [facility]. However, they told me that his ears need to be cleaned and if not they will not be able to perform the exam.   I cannot remember if you checked his ears yesterday. Is there a way we can come in before the exam to make sure his ears are free of wax? Any help would be appreciated. Thank you!   Sincerely,  Verna Moss

## 2022-08-02 RX ORDER — CHOLECALCIFEROL (VITAMIN D3) 50 MCG
1 TABLET ORAL DAILY
Qty: 90 TABLET | Refills: 3 | Status: SHIPPED | OUTPATIENT
Start: 2022-08-02 | End: 2022-09-01

## 2022-08-05 ENCOUNTER — OFFICE VISIT (OUTPATIENT)
Dept: FAMILY MEDICINE CLINIC | Facility: CLINIC | Age: 87
End: 2022-08-05
Payer: MEDICARE

## 2022-08-05 VITALS
WEIGHT: 220 LBS | HEIGHT: 68.5 IN | BODY MASS INDEX: 32.96 KG/M2 | HEART RATE: 49 BPM | DIASTOLIC BLOOD PRESSURE: 68 MMHG | TEMPERATURE: 98 F | SYSTOLIC BLOOD PRESSURE: 129 MMHG

## 2022-08-05 DIAGNOSIS — H61.23 IMPACTED CERUMEN OF BOTH EARS: Primary | ICD-10-CM

## 2022-08-05 DIAGNOSIS — H61.23 BILATERAL IMPACTED CERUMEN: ICD-10-CM

## 2022-08-05 PROCEDURE — 69209 REMOVE IMPACTED EAR WAX UNI: CPT | Performed by: FAMILY MEDICINE

## 2022-08-05 PROCEDURE — 99212 OFFICE O/P EST SF 10 MIN: CPT | Performed by: FAMILY MEDICINE

## 2022-08-17 ENCOUNTER — HOSPITAL ENCOUNTER (OUTPATIENT)
Dept: MRI IMAGING | Facility: HOSPITAL | Age: 87
Discharge: HOME | End: 2022-08-17
Attending: RADIOLOGY
Payer: MEDICARE

## 2022-08-17 DIAGNOSIS — C64.9 RENAL CELL CARCINOMA (HCC): ICD-10-CM

## 2022-08-17 PROCEDURE — A9575 INJ GADOTERATE MEGLUMI 0.1ML: HCPCS | Performed by: RADIOLOGY

## 2022-08-17 PROCEDURE — 74183 MRI ABD W/O CNTR FLWD CNTR: CPT | Performed by: RADIOLOGY

## 2022-08-19 RX ORDER — AMLODIPINE BESYLATE 5 MG/1
5 TABLET ORAL DAILY
Qty: 90 TABLET | Refills: 1 | Status: SHIPPED | OUTPATIENT
Start: 2022-08-19

## 2022-08-19 NOTE — TELEPHONE ENCOUNTER
Refill passed per [facility] protocol.     Requested Prescriptions   Pending Prescriptions Disp Refills    AMLODIPINE 5 MG Oral Tab [Pharmacy Med Name: Amlodipine Besylate 5 Mg Tab Unic] 90 tablet 0     Sig: TAKE ONE TABLET BY MOUTH ONE TIME DAILY        Hypertensive Medications Protocol Passed - 8/15/2022  4:44 PM        Passed - In person appointment in the past 12 or next 3 months       Recent Outpatient Visits              2 weeks ago Impacted cerumen of both ears    Janae Hirsch [place], MD    Office Visit    3 weeks ago Other fatigue    Janae Hirsch [place], MD    Office Visit    4 months ago Prostate cancer [facility])    [facility], [address], Renata Ornelas MD    Office Visit    5 months ago Essential hypertension    [facility], [address] [place], MD    Office Visit    7 months ago ARMD (age-related macular degeneration), bilateral    [place] [facility], [address] Candi [address] MD    Office Visit     Future Appointments         Provider Department Appt Notes    In 1 month Kolby Bond MD [facility], [place] [address] [place] [address] Renal insufficiency               Passed - Last BP reading less than 140/90     BP Readings from Last 1 Encounters:  08/05/22 : 129/68                Passed - CMP or BMP in past 6 months     Recent Results (from the past 4392 hour(s))   COMP METABOLIC PANEL (14)    Collection Time: 07/29/22 10:00 AM   Result Value Ref Range    Glucose 103 (H) 70 - 99 mg/dL    Sodium 143 136 - 145 mmol/L    Potassium 5.1 3.5 - 5.1 mmol/L    Chloride 108 98 - 112 mmol/L    CO2 30.0 21.0 - 32.0 mmol/L    Anion Gap 5 0 - 18 mmol/L    BUN 22 (H) 7 - 18 mg/dL    Creatinine 1.39 (H) 0.70 - 1.30 mg/dL    BUN/CREA Ratio 15.8 10.0 - 20.0    Calcium, Total 9.6 8.5 - 10.1 mg/dL    Calculated Osmolality 300 (H) 275 - 295 mOsm/kg    GFR, Non-African American 44 (L) >=60    GFR, -American 51 (L) >=60    ALT 24 16 - 61 U/L    AST 17 15 - 37 U/L    Alkaline Phosphatase 73 45 - 117 U/L    Bilirubin, Total 0.5 0.1 - 2.0 mg/dL    Total Protein 6.7 6.4 - 8.2 g/dL    Albumin 3.4 3.4 - 5.0 g/dL    Globulin  3.3 2.8 - 4.4 g/dL    A/G Ratio 1.0 1.0 - 2.0    Patient Fasting for CMP? No      *Note: Due to a large number of results and/or encounters for the requested time period, some results have not been displayed. A complete set of results can be found in Results Review.                  Passed - In person appointment or virtual visit in the past 6 months       Recent Outpatient Visits              2 weeks ago Impacted cerumen of both Nancy Hinton MD    Office Visit    3 weeks ago Other fatigue    [address], Mary Chaparro MD    Office Visit    4 months ago Prostate cancer [facility])    [facility], [address], Germania Lockhart MD    Office Visit    5 months ago Essential hypertension    [address], [address] Mary [address] MD    Office Visit    7 months ago ARMD (age-related macular degeneration), bilateral    [address], [address] Mary Hunter MD    Office Visit     Future Appointments         Provider Department Appt Notes    In 1 month Devin Burt, Sonja Calloway MD [address], [address] [facility] Renal insufficiency               Passed - GFR > 50     No results found for: [facility]                    Recent Outpatient Visits              2 weeks ago Impacted cerumen of both Nancy Hinton MD    Office Visit    3 weeks ago Other fatigue    Aura Leyden, MD    Office Visit    4 months ago Prostate cancer [facility])    [facility] Shailesh Bear MD    Office Visit    5 months ago Essential hypertension    [place] [employer] [place], [facility], [address] [facility] Meri Sorensen MD    Office Visit    7 months ago ARMD (age-related macular degeneration), bilateral    Ne Little MD    Office Visit            Future Appointments         Provider Department Appt Notes    In 1 month Ita Green MD [facility], [facility], [address] [place] [address] Renal insufficiency

## 2022-09-23 RX ORDER — LEVOTHYROXINE SODIUM 0.05 MG/1
50 TABLET ORAL
Qty: 90 TABLET | Refills: 1 | Status: SHIPPED | OUTPATIENT
Start: 2022-09-23 | End: 2023-03-19

## 2022-09-23 NOTE — TELEPHONE ENCOUNTER
Refill passed per [facility] protocol.   Requested Prescriptions   Pending Prescriptions Disp Refills    LEVOTHYROXINE 50 MCG Oral Tab [Pharmacy Med Name: Levothyroxine Sodium 50 Mcg Tab Lupi] 90 tablet 0     Sig: Take one tablet by mouth every morning before breakfast        Thyroid Medication Protocol Passed - 9/23/2022 11:39 AM        Passed - TSH in past 12 months        Passed - Last TSH value is normal     Lab Results   Component Value Date    TSH 1.920 07/29/2022                 Passed - In person appointment or virtual visit in the past 12 mos or appointment in next 3 mos       Recent Outpatient Visits              1 month ago Impacted cerumen of both Tim Castro MD    Office Visit    1 month ago Other fatigue    [employer] [facility], [address] MD    Office Visit    5 months ago Prostate cancer [facility])    [facility], [address], Shaggy Beyer MD    Office Visit    6 months ago Essential hypertension    [place] [employer] [facility], [facility] Janae Hunter [facility], MD Kalani    Office Visit    8 months ago ARMD (age-related macular degeneration), bilateral    [employer] [facility], [address] MD    Office Visit     Future Appointments         Provider Department Appt Notes    In 2 weeks Corona Dominique MD [employer] [facility], [address] Renal insufficiency                   Recent Outpatient Visits              1 month ago Impacted cerumen of both Tim Castro MD    Office Visit    1 month ago Other fatigue    Perry Cardoso MD    Office Visit    5 months ago Prostate cancer [facility])    [facility] Zaina Schilling [facility]    Office Visit    6 months ago Essential hypertension    Perry Cardoso MD Office Visit    8 months ago ARMD (age-related macular degeneration), bilateral    Mindi Noel MD    Office Visit          Future Appointments         Provider Department Appt Notes    In 2 weeks Apollo Garcia MD [address] [place] [address] Renal insufficiency

## 2022-10-13 ENCOUNTER — TELEPHONE (OUTPATIENT)
Dept: NEPHROLOGY | Facility: CLINIC | Age: 87
End: 2022-10-13

## 2022-10-13 ENCOUNTER — OFFICE VISIT (OUTPATIENT)
Dept: NEPHROLOGY | Facility: CLINIC | Age: 87
End: 2022-10-13
Payer: MEDICARE

## 2022-10-13 VITALS
WEIGHT: 222 LBS | HEIGHT: 68.5 IN | HEART RATE: 50 BPM | BODY MASS INDEX: 33.26 KG/M2 | SYSTOLIC BLOOD PRESSURE: 158 MMHG | DIASTOLIC BLOOD PRESSURE: 70 MMHG

## 2022-10-13 DIAGNOSIS — I10 PRIMARY HYPERTENSION: ICD-10-CM

## 2022-10-13 DIAGNOSIS — R56.9 SEIZURE (HCC): ICD-10-CM

## 2022-10-13 DIAGNOSIS — N18.2 CHRONIC KIDNEY DISEASE, STAGE II (MILD): Primary | ICD-10-CM

## 2022-10-13 DIAGNOSIS — C64.9 RENAL CELL CARCINOMA, UNSPECIFIED LATERALITY (HCC): ICD-10-CM

## 2022-10-13 PROCEDURE — 99205 OFFICE O/P NEW HI 60 MIN: CPT | Performed by: INTERNAL MEDICINE

## 2022-10-14 NOTE — PATIENT INSTRUCTIONS
Please increase lisinopril to twice a day    Please do blood test and urine test sometime in November I will call you with results    Please asked me when I need to see you again based on how your labs are    Try to keep blood pressure less than 130/80 please check daily and make a chart    If you are doing okay would like to see you sometime in early December    Nice to meet you Abhishek Wells and Darcie Santos    Please get a flu shot

## 2022-10-15 ENCOUNTER — PATIENT MESSAGE (OUTPATIENT)
Dept: NEPHROLOGY | Facility: CLINIC | Age: 87
End: 2022-10-15

## 2022-10-17 RX ORDER — METOPROLOL SUCCINATE 50 MG/1
50 TABLET, EXTENDED RELEASE ORAL DAILY
Qty: 90 TABLET | Refills: 1 | Status: SHIPPED | OUTPATIENT
Start: 2022-10-17

## 2022-10-17 RX ORDER — LISINOPRIL 20 MG/1
TABLET ORAL
Qty: 180 TABLET | Refills: 0 | OUTPATIENT
Start: 2022-10-17

## 2022-10-17 NOTE — TELEPHONE ENCOUNTER
Please review; protocol failed.     Requested Prescriptions   Pending Prescriptions Disp Refills    METOPROLOL SUCCINATE ER 50 MG Oral Tablet 24 Hr [Pharmacy Med Name: Metoprolol Succinate Er 24hr 50 Mg Tab Nort] 90 tablet 0     Sig: TAKE ONE TABLET BY MOUTH ONE TIME DAILY        Hypertensive Medications Protocol Failed - 10/15/2022 11:43 AM        Failed - Last BP reading less than 140/90     BP Readings from Last 1 Encounters:  10/13/22 : 158/70                Failed - EGFRCR or GFRNAA > 50     GFR Evaluation  GFRNAA: 44 , resulted on 7/29/2022            Passed - In person appointment in the past 12 or next 3 months       Recent Outpatient Visits              4 days ago Chronic kidney disease, stage II (mild)    [facility], Janine Snow Jamel Reeds, MD    Office Visit    2 months ago Impacted cerumen of both Prince Williams MD    Office Visit    2 months ago Other fatigue    [facility], Derrick Figueroa MD    Office Visit    6 months ago Prostate cancer [facility])    [facility], [address], Eulalio Olvera MD    Office Visit    7 months ago Essential hypertension    [employer], [address] [facility], MD Kalani    Office Visit                 Passed - CMP or BMP in past 6 months     Recent Results (from the past 4392 hour(s))   COMP METABOLIC PANEL (14)    Collection Time: 07/29/22 10:00 AM   Result Value Ref Range    Glucose 103 (H) 70 - 99 mg/dL    Sodium 143 136 - 145 mmol/L    Potassium 5.1 3.5 - 5.1 mmol/L    Chloride 108 98 - 112 mmol/L    CO2 30.0 21.0 - 32.0 mmol/L    Anion Gap 5 0 - 18 mmol/L    BUN 22 (H) 7 - 18 mg/dL    Creatinine 1.39 (H) 0.70 - 1.30 mg/dL    BUN/CREA Ratio 15.8 10.0 - 20.0    Calcium, Total 9.6 8.5 - 10.1 mg/dL    Calculated Osmolality 300 (H) 275 - 295 mOsm/kg    GFR, Non- 44 (L) >=60    GFR, -American 51 (L) >=60 ALT 24 16 - 61 U/L    AST 17 15 - 37 U/L    Alkaline Phosphatase 73 45 - 117 U/L    Bilirubin, Total 0.5 0.1 - 2.0 mg/dL    Total Protein 6.7 6.4 - 8.2 g/dL    Albumin 3.4 3.4 - 5.0 g/dL    Globulin  3.3 2.8 - 4.4 g/dL    A/G Ratio 1.0 1.0 - 2.0    Patient Fasting for CMP? No      *Note: Due to a large number of results and/or encounters for the requested time period, some results have not been displayed. A complete set of results can be found in Results Review.                  Passed - In person appointment or virtual visit in the past 6 months       Recent Outpatient Visits              4 days ago Chronic kidney disease, stage II (mild)    [facility], [address] [place] [address], Kendall Santillan MD    Office Visit    2 months ago Impacted cerumen of both Miranda Claudio MD    Office Visit    2 months ago Other fatigue    [address], Armando Bertrand MD    Office Visit    6 months ago Prostate cancer [facility])    [facility], [address], Gaby Ko MD    Office Visit    7 months ago Essential hypertension    [address] [place] [place], [address] MD    Office Visit                           Recent Outpatient Visits              4 days ago Chronic kidney disease, stage II (mild)    [facility], [address] [place] [address], Kendall Santillan MD    Office Visit    2 months ago Impacted cerumen of both Miranda Claudio MD    Office Visit    2 months ago Other fatigue    Moshe Aguillon MD    Office Visit    6 months ago Prostate cancer [facility])    [facility] for Jorge L Nix MD    Office Visit    7 months ago Essential hypertension    Moshe Aguillon MD    Office Visit

## 2022-10-18 NOTE — TELEPHONE ENCOUNTER
From: Erasto Gomez  Sent: 10/18/2022 10:18 AM CDT  To: [facility] Clinical Staff  Subject: Changed dosage refill    Please confirm that you contacted my pharmacy to fill the changed   prescription.   Roderick Stage 11/[birth year]

## 2022-10-19 ENCOUNTER — PATIENT MESSAGE (OUTPATIENT)
Dept: FAMILY MEDICINE CLINIC | Facility: CLINIC | Age: 87
End: 2022-10-19

## 2022-10-19 NOTE — TELEPHONE ENCOUNTER
Queried chart and immunization record updated. From: Julianna Butler  To: Bull Villalobos MD  Sent: 10/19/2022 10:12 AM CDT  Subject: Vacination    For the record, I received a flu vaccination at my [address] on 10/15/2022.   Rajesh Alvarado 11/21/1929

## 2022-11-05 ENCOUNTER — LAB ENCOUNTER (OUTPATIENT)
Dept: LAB | Facility: HOSPITAL | Age: 87
End: 2022-11-05
Attending: INTERNAL MEDICINE
Payer: MEDICARE

## 2022-11-05 DIAGNOSIS — C61 PROSTATE CANCER (HCC): ICD-10-CM

## 2022-11-05 DIAGNOSIS — I10 PRIMARY HYPERTENSION: ICD-10-CM

## 2022-11-05 DIAGNOSIS — N18.2 CHRONIC KIDNEY DISEASE, STAGE II (MILD): ICD-10-CM

## 2022-11-05 DIAGNOSIS — R56.9 SEIZURE (HCC): ICD-10-CM

## 2022-11-05 LAB
ALBUMIN SERPL-MCNC: 3.1 G/DL (ref 3.4–5)
ALBUMIN/GLOB SERPL: 1 {RATIO} (ref 1–2)
ALP LIVER SERPL-CCNC: 64 U/L
ALT SERPL-CCNC: 20 U/L
ANION GAP SERPL CALC-SCNC: 4 MMOL/L (ref 0–18)
AST SERPL-CCNC: 20 U/L (ref 15–37)
BASOPHILS # BLD AUTO: 0.04 X10(3) UL (ref 0–0.2)
BASOPHILS NFR BLD AUTO: 0.7 %
BILIRUB SERPL-MCNC: 0.5 MG/DL (ref 0.1–2)
BILIRUB UR QL: NEGATIVE
BUN BLD-MCNC: 20 MG/DL (ref 7–18)
BUN/CREAT SERPL: 15.7 (ref 10–20)
CALCIUM BLD-MCNC: 8.9 MG/DL (ref 8.5–10.1)
CHLORIDE SERPL-SCNC: 110 MMOL/L (ref 98–112)
CLARITY UR: CLEAR
CO2 SERPL-SCNC: 28 MMOL/L (ref 21–32)
COLOR UR: YELLOW
CREAT BLD-MCNC: 1.27 MG/DL
CREAT UR-SCNC: 258 MG/DL
DEPRECATED RDW RBC AUTO: 41.1 FL (ref 35.1–46.3)
EOSINOPHIL # BLD AUTO: 0.14 X10(3) UL (ref 0–0.7)
EOSINOPHIL NFR BLD AUTO: 2.6 %
ERYTHROCYTE [DISTWIDTH] IN BLOOD BY AUTOMATED COUNT: 12.3 % (ref 11–15)
FASTING STATUS PATIENT QL REPORTED: NO
GFR SERPLBLD BASED ON 1.73 SQ M-ARVRAT: 53 ML/MIN/1.73M2 (ref 60–?)
GLOBULIN PLAS-MCNC: 3.1 G/DL (ref 2.8–4.4)
GLUCOSE BLD-MCNC: 77 MG/DL (ref 70–99)
GLUCOSE UR-MCNC: NEGATIVE MG/DL
HCT VFR BLD AUTO: 41.6 %
HGB BLD-MCNC: 13.5 G/DL
HGB UR QL STRIP.AUTO: NEGATIVE
HYALINE CASTS #/AREA URNS AUTO: PRESENT /LPF
IMM GRANULOCYTES # BLD AUTO: 0.03 X10(3) UL (ref 0–1)
IMM GRANULOCYTES NFR BLD: 0.6 %
LEUKOCYTE ESTERASE UR QL STRIP.AUTO: NEGATIVE
LYMPHOCYTES # BLD AUTO: 1.29 X10(3) UL (ref 1–4)
LYMPHOCYTES NFR BLD AUTO: 24.1 %
MCH RBC QN AUTO: 29.7 PG (ref 26–34)
MCHC RBC AUTO-ENTMCNC: 32.5 G/DL (ref 31–37)
MCV RBC AUTO: 91.6 FL
MICROALBUMIN UR-MCNC: 81.1 MG/DL
MICROALBUMIN/CREAT 24H UR-RTO: 314.3 UG/MG (ref ?–30)
MONOCYTES # BLD AUTO: 0.66 X10(3) UL (ref 0.1–1)
MONOCYTES NFR BLD AUTO: 12.3 %
NEUTROPHILS # BLD AUTO: 3.19 X10 (3) UL (ref 1.5–7.7)
NEUTROPHILS # BLD AUTO: 3.19 X10(3) UL (ref 1.5–7.7)
NEUTROPHILS NFR BLD AUTO: 59.7 %
NITRITE UR QL STRIP.AUTO: NEGATIVE
OSMOLALITY SERPL CALC.SUM OF ELEC: 295 MOSM/KG (ref 275–295)
PH UR: 5 [PH] (ref 5–8)
PLATELET # BLD AUTO: 214 10(3)UL (ref 150–450)
POTASSIUM SERPL-SCNC: 4.3 MMOL/L (ref 3.5–5.1)
PROT SERPL-MCNC: 6.2 G/DL (ref 6.4–8.2)
PROT UR-MCNC: 100 MG/DL
PROT UR-MCNC: 120.2 MG/DL
PSA SERPL-MCNC: 0.07 NG/ML (ref ?–4)
PTH-INTACT SERPL-MCNC: 43.6 PG/ML (ref 18.5–88)
RBC # BLD AUTO: 4.54 X10(6)UL
SODIUM SERPL-SCNC: 142 MMOL/L (ref 136–145)
SP GR UR STRIP: 1.02 (ref 1–1.03)
UROBILINOGEN UR STRIP-ACNC: 2
VIT C UR-MCNC: NEGATIVE MG/DL
WBC # BLD AUTO: 5.4 X10(3) UL (ref 4–11)

## 2022-11-05 PROCEDURE — 80053 COMPREHEN METABOLIC PANEL: CPT

## 2022-11-05 PROCEDURE — 84165 PROTEIN E-PHORESIS SERUM: CPT

## 2022-11-05 PROCEDURE — 83970 ASSAY OF PARATHORMONE: CPT

## 2022-11-05 PROCEDURE — 86335 IMMUNFIX E-PHORSIS/URINE/CSF: CPT

## 2022-11-05 PROCEDURE — 81001 URINALYSIS AUTO W/SCOPE: CPT

## 2022-11-05 PROCEDURE — 84153 ASSAY OF PSA TOTAL: CPT

## 2022-11-05 PROCEDURE — 82570 ASSAY OF URINE CREATININE: CPT

## 2022-11-05 PROCEDURE — 82043 UR ALBUMIN QUANTITATIVE: CPT

## 2022-11-05 PROCEDURE — 84166 PROTEIN E-PHORESIS/URINE/CSF: CPT

## 2022-11-05 PROCEDURE — 85025 COMPLETE CBC W/AUTO DIFF WBC: CPT

## 2022-11-05 PROCEDURE — 84156 ASSAY OF PROTEIN URINE: CPT

## 2022-11-05 PROCEDURE — 36415 COLL VENOUS BLD VENIPUNCTURE: CPT

## 2022-11-07 LAB
ALBUMIN SERPL ELPH-MCNC: 3.5 G/DL (ref 3.75–5.21)
ALBUMIN/GLOB SERPL: 1.46 {RATIO} (ref 1–2)
ALPHA1 GLOB SERPL ELPH-MCNC: 0.25 G/DL (ref 0.19–0.46)
ALPHA2 GLOB SERPL ELPH-MCNC: 0.66 G/DL (ref 0.48–1.05)
B-GLOBULIN SERPL ELPH-MCNC: 0.69 G/DL (ref 0.68–1.23)
GAMMA GLOB SERPL ELPH-MCNC: 0.8 G/DL (ref 0.62–1.7)
PROT SERPL-MCNC: 5.9 G/DL (ref 6.4–8.2)

## 2022-11-08 ENCOUNTER — TELEPHONE (OUTPATIENT)
Dept: NEPHROLOGY | Facility: CLINIC | Age: 87
End: 2022-11-08

## 2022-11-08 ENCOUNTER — LAB ENCOUNTER (OUTPATIENT)
Dept: LAB | Facility: HOSPITAL | Age: 87
End: 2022-11-08
Attending: INTERNAL MEDICINE
Payer: MEDICARE

## 2022-11-08 DIAGNOSIS — R56.9 SEIZURE (HCC): ICD-10-CM

## 2022-11-08 DIAGNOSIS — I10 PRIMARY HYPERTENSION: ICD-10-CM

## 2022-11-08 DIAGNOSIS — N18.2 CHRONIC KIDNEY DISEASE, STAGE II (MILD): ICD-10-CM

## 2022-11-08 LAB — PROT UR-MCNC: 117.4 MG/DL

## 2022-11-08 PROCEDURE — 84156 ASSAY OF PROTEIN URINE: CPT

## 2022-11-08 PROCEDURE — 84166 PROTEIN E-PHORESIS/URINE/CSF: CPT

## 2022-11-08 PROCEDURE — 86335 IMMUNFIX E-PHORSIS/URINE/CSF: CPT

## 2022-11-15 ENCOUNTER — TELEPHONE (OUTPATIENT)
Dept: NEPHROLOGY | Facility: CLINIC | Age: 87
End: 2022-11-15

## 2023-01-02 ENCOUNTER — TELEPHONE (OUTPATIENT)
Dept: NEUROLOGY | Facility: CLINIC | Age: 88
End: 2023-01-02

## 2023-01-02 DIAGNOSIS — G40.909 SEIZURE DISORDER (HCC): ICD-10-CM

## 2023-01-03 RX ORDER — DIVALPROEX SODIUM 500 MG/1
TABLET, EXTENDED RELEASE ORAL
Qty: 90 TABLET | Refills: 0 | OUTPATIENT
Start: 2023-01-03

## 2023-01-04 ENCOUNTER — TELEPHONE (OUTPATIENT)
Dept: NEUROLOGY | Facility: CLINIC | Age: 88
End: 2023-01-04

## 2023-01-04 DIAGNOSIS — G40.909 SEIZURE DISORDER (HCC): Primary | ICD-10-CM

## 2023-01-05 ENCOUNTER — TELEPHONE (OUTPATIENT)
Dept: NEPHROLOGY | Facility: CLINIC | Age: 88
End: 2023-01-05

## 2023-01-05 ENCOUNTER — OFFICE VISIT (OUTPATIENT)
Dept: NEPHROLOGY | Facility: CLINIC | Age: 88
End: 2023-01-05
Payer: MEDICARE

## 2023-01-05 VITALS
HEIGHT: 68.5 IN | DIASTOLIC BLOOD PRESSURE: 80 MMHG | SYSTOLIC BLOOD PRESSURE: 158 MMHG | HEART RATE: 57 BPM | WEIGHT: 230 LBS | BODY MASS INDEX: 34.46 KG/M2

## 2023-01-05 DIAGNOSIS — E06.3 HYPOTHYROIDISM DUE TO HASHIMOTO'S THYROIDITIS: ICD-10-CM

## 2023-01-05 DIAGNOSIS — N18.32 STAGE 3B CHRONIC KIDNEY DISEASE (HCC): ICD-10-CM

## 2023-01-05 DIAGNOSIS — I10 PRIMARY HYPERTENSION: ICD-10-CM

## 2023-01-05 DIAGNOSIS — E03.8 HYPOTHYROIDISM DUE TO HASHIMOTO'S THYROIDITIS: ICD-10-CM

## 2023-01-05 DIAGNOSIS — C61 PROSTATE CANCER (HCC): Primary | ICD-10-CM

## 2023-01-05 DIAGNOSIS — C64.9 RENAL CELL CARCINOMA, UNSPECIFIED LATERALITY (HCC): ICD-10-CM

## 2023-01-05 DIAGNOSIS — G40.909 SEIZURE DISORDER (HCC): ICD-10-CM

## 2023-01-05 PROCEDURE — 1126F AMNT PAIN NOTED NONE PRSNT: CPT | Performed by: INTERNAL MEDICINE

## 2023-01-05 PROCEDURE — 99214 OFFICE O/P EST MOD 30 MIN: CPT | Performed by: INTERNAL MEDICINE

## 2023-01-05 RX ORDER — DIVALPROEX SODIUM 500 MG/1
TABLET, EXTENDED RELEASE ORAL
Qty: 90 TABLET | Refills: 0 | Status: SHIPPED | OUTPATIENT
Start: 2023-01-05

## 2023-01-05 RX ORDER — LEVETIRACETAM 750 MG/1
TABLET ORAL
Qty: 180 TABLET | Refills: 0 | Status: SHIPPED | OUTPATIENT
Start: 2023-01-05

## 2023-01-05 NOTE — TELEPHONE ENCOUNTER
Medication: levetiracetam 750 MG Oral Tab, Take 1 tablet (750 mg total) by mouth 2 (two) times daily.     LOV:11/11/21-Dr Wong Myers  NOV:3/8/23-Dr Whipple    [place]: 11/11/2021 (#180/3RF)

## 2023-01-05 NOTE — TELEPHONE ENCOUNTER
KONSTANTIN   Patient need a refill for divalproex Sodium  MG Oral Tablet 24 Hr as well .   Patient did selene a f/u with     Patient only has medication for 2 days

## 2023-01-05 NOTE — TELEPHONE ENCOUNTER
Patient's Daughter called to check on the status of this medication. Patient has made an appointment with Dr. Lyric Magaña. She is hoping that this prescription and the other one (waiting for Doctor's okay) can be sent in today as she doesn't live by her Father but is there today and will need to pick them up  for him.     Her name is Jovanni Stephenson and her # is [phone]

## 2023-01-05 NOTE — PATIENT INSTRUCTIONS
Please increase amlodipine to twice a day take 5 mg in the morning 5 at bedtime watch for worsening of your swelling    Call me in about 3 weeks by the end of January to let me know how you are doing    You need your MRI in March its in the computer    Good to see you both happy and healthy new year    To keep pressure under 135/80

## 2023-01-05 NOTE — TELEPHONE ENCOUNTER
Refill requests have been sent to Dr. Patrizia Maldonado. Reviewed and electronically signed by:  [address] [facility], [address] [place] [address], [facility]

## 2023-01-06 DIAGNOSIS — G40.909 SEIZURE DISORDER (HCC): ICD-10-CM

## 2023-01-06 RX ORDER — LEVETIRACETAM 750 MG/1
TABLET ORAL
Qty: 180 TABLET | Refills: 0 | OUTPATIENT
Start: 2023-01-06

## 2023-01-06 NOTE — TELEPHONE ENCOUNTER
Pt rod had thyroid checked laterly  Could you let his daughter Nancy Dorman know that I would like to check sometime in January the orders in the computer thank you

## 2023-01-06 NOTE — TELEPHONE ENCOUNTER
Patient's Daughter is saying Pharmacy is telling her they do not have this prescription  LEVETIRACETAM 750 MG Oral Tab  Please advise    Her info is: Natalia Gutierrez [phone]

## 2023-01-06 NOTE — TELEPHONE ENCOUNTER
Called & spoke to Jenny at pharmacy. She states pharmacy did not receive prescription for Levetiracetam 750mg.  Verbal given for the following     LEVETIRACETAM 750 MG Oral Tab 180 tablet 0 1/5/2023     Sig: TAKE ONE TABLET BY MOUTH TWICE DAILY    Sent to pharmacy as: levETIRAcetam 750 MG Oral Tablet (KEPPRA)      Read back performed

## 2023-01-10 RX ORDER — LISINOPRIL 10 MG/1
10 TABLET ORAL 2 TIMES DAILY
Qty: 180 TABLET | Refills: 0 | Status: SHIPPED | OUTPATIENT
Start: 2023-01-10 | End: 2024-01-05

## 2023-01-10 NOTE — TELEPHONE ENCOUNTER
LOV- 1/5/2023  RTC-  We will touch base with me in about 3 weeks we will repeat TSH  F/U- no appointment scheduled

## 2023-01-16 RX ORDER — AMLODIPINE BESYLATE 5 MG/1
5 TABLET ORAL DAILY
Qty: 90 TABLET | Refills: 1 | Status: SHIPPED | OUTPATIENT
Start: 2023-01-16

## 2023-02-08 ENCOUNTER — OFFICE VISIT (OUTPATIENT)
Dept: FAMILY MEDICINE CLINIC | Facility: CLINIC | Age: 88
End: 2023-02-08
Payer: MEDICARE

## 2023-02-08 ENCOUNTER — LAB ENCOUNTER (OUTPATIENT)
Dept: LAB | Age: 88
End: 2023-02-08
Attending: FAMILY MEDICINE
Payer: MEDICARE

## 2023-02-08 VITALS
RESPIRATION RATE: 18 BRPM | OXYGEN SATURATION: 97 % | SYSTOLIC BLOOD PRESSURE: 126 MMHG | BODY MASS INDEX: 33.41 KG/M2 | HEIGHT: 68.5 IN | WEIGHT: 223 LBS | HEART RATE: 50 BPM | DIASTOLIC BLOOD PRESSURE: 60 MMHG

## 2023-02-08 DIAGNOSIS — H35.89 RPE MOTTLING OF MACULA: ICD-10-CM

## 2023-02-08 DIAGNOSIS — I10 PRIMARY HYPERTENSION: ICD-10-CM

## 2023-02-08 DIAGNOSIS — R56.9 SEIZURE (HCC): ICD-10-CM

## 2023-02-08 DIAGNOSIS — C61 PROSTATE CANCER (HCC): ICD-10-CM

## 2023-02-08 DIAGNOSIS — N18.32 STAGE 3B CHRONIC KIDNEY DISEASE (HCC): ICD-10-CM

## 2023-02-08 DIAGNOSIS — C64.9 RENAL CELL CARCINOMA, UNSPECIFIED LATERALITY (HCC): ICD-10-CM

## 2023-02-08 DIAGNOSIS — H90.3 BILATERAL SENSORINEURAL HEARING LOSS: ICD-10-CM

## 2023-02-08 DIAGNOSIS — Z00.00 ENCOUNTER FOR ANNUAL HEALTH EXAMINATION: ICD-10-CM

## 2023-02-08 DIAGNOSIS — H35.30 ARMD (AGE-RELATED MACULAR DEGENERATION), BILATERAL: Primary | ICD-10-CM

## 2023-02-08 DIAGNOSIS — E06.3 HYPOTHYROIDISM DUE TO HASHIMOTO'S THYROIDITIS: ICD-10-CM

## 2023-02-08 DIAGNOSIS — E03.8 HYPOTHYROIDISM DUE TO HASHIMOTO'S THYROIDITIS: ICD-10-CM

## 2023-02-08 LAB
ANION GAP SERPL CALC-SCNC: 6 MMOL/L (ref 0–18)
BASOPHILS # BLD AUTO: 0.04 X10(3) UL (ref 0–0.2)
BASOPHILS NFR BLD AUTO: 0.6 %
BUN BLD-MCNC: 27 MG/DL (ref 7–18)
BUN/CREAT SERPL: 18.6 (ref 10–20)
CALCIUM BLD-MCNC: 9 MG/DL (ref 8.5–10.1)
CHLORIDE SERPL-SCNC: 111 MMOL/L (ref 98–112)
CO2 SERPL-SCNC: 28 MMOL/L (ref 21–32)
CREAT BLD-MCNC: 1.45 MG/DL
DEPRECATED RDW RBC AUTO: 40.6 FL (ref 35.1–46.3)
EOSINOPHIL # BLD AUTO: 0.12 X10(3) UL (ref 0–0.7)
EOSINOPHIL NFR BLD AUTO: 1.9 %
ERYTHROCYTE [DISTWIDTH] IN BLOOD BY AUTOMATED COUNT: 12.2 % (ref 11–15)
FASTING STATUS PATIENT QL REPORTED: YES
GFR SERPLBLD BASED ON 1.73 SQ M-ARVRAT: 45 ML/MIN/1.73M2 (ref 60–?)
GLUCOSE BLD-MCNC: 105 MG/DL (ref 70–99)
HCT VFR BLD AUTO: 42.9 %
HGB BLD-MCNC: 14.2 G/DL
IMM GRANULOCYTES # BLD AUTO: 0.02 X10(3) UL (ref 0–1)
IMM GRANULOCYTES NFR BLD: 0.3 %
LYMPHOCYTES # BLD AUTO: 1.22 X10(3) UL (ref 1–4)
LYMPHOCYTES NFR BLD AUTO: 19.6 %
MCH RBC QN AUTO: 30.1 PG (ref 26–34)
MCHC RBC AUTO-ENTMCNC: 33.1 G/DL (ref 31–37)
MCV RBC AUTO: 90.9 FL
MONOCYTES # BLD AUTO: 0.66 X10(3) UL (ref 0.1–1)
MONOCYTES NFR BLD AUTO: 10.6 %
NEUTROPHILS # BLD AUTO: 4.17 X10 (3) UL (ref 1.5–7.7)
NEUTROPHILS # BLD AUTO: 4.17 X10(3) UL (ref 1.5–7.7)
NEUTROPHILS NFR BLD AUTO: 67 %
OSMOLALITY SERPL CALC.SUM OF ELEC: 305 MOSM/KG (ref 275–295)
PLATELET # BLD AUTO: 227 10(3)UL (ref 150–450)
POTASSIUM SERPL-SCNC: 4.5 MMOL/L (ref 3.5–5.1)
PSA SERPL-MCNC: 0.07 NG/ML (ref ?–4)
RBC # BLD AUTO: 4.72 X10(6)UL
SODIUM SERPL-SCNC: 145 MMOL/L (ref 136–145)
TSI SER-ACNC: 2 MIU/ML (ref 0.36–3.74)
WBC # BLD AUTO: 6.2 X10(3) UL (ref 4–11)

## 2023-02-08 PROCEDURE — 85025 COMPLETE CBC W/AUTO DIFF WBC: CPT

## 2023-02-08 PROCEDURE — 80048 BASIC METABOLIC PNL TOTAL CA: CPT

## 2023-02-08 PROCEDURE — 84153 ASSAY OF PSA TOTAL: CPT

## 2023-02-08 PROCEDURE — 1126F AMNT PAIN NOTED NONE PRSNT: CPT | Performed by: FAMILY MEDICINE

## 2023-02-08 PROCEDURE — G0439 PPPS, SUBSEQ VISIT: HCPCS | Performed by: FAMILY MEDICINE

## 2023-02-08 PROCEDURE — 36415 COLL VENOUS BLD VENIPUNCTURE: CPT

## 2023-02-08 PROCEDURE — 84443 ASSAY THYROID STIM HORMONE: CPT

## 2023-02-08 RX ORDER — CHOLECALCIFEROL (VITAMIN D3) 125 MCG
2000 CAPSULE ORAL DAILY
COMMUNITY
Start: 2023-01-10

## 2023-02-23 ENCOUNTER — TELEPHONE (OUTPATIENT)
Dept: SURGERY | Facility: CLINIC | Age: 88
End: 2023-02-23

## 2023-02-23 NOTE — TELEPHONE ENCOUNTER
Called and left voicemail informing patient that his apt for today @ 2/23/23 @ 4pm has been cancelled. Ask patient to give our office a call back for assistance with rescheduling.

## 2023-03-15 ENCOUNTER — OFFICE VISIT (OUTPATIENT)
Dept: NEUROLOGY | Facility: CLINIC | Age: 88
End: 2023-03-15
Payer: MEDICARE

## 2023-03-15 ENCOUNTER — LAB ENCOUNTER (OUTPATIENT)
Dept: LAB | Facility: HOSPITAL | Age: 88
End: 2023-03-15
Attending: Other
Payer: MEDICARE

## 2023-03-15 VITALS — HEIGHT: 69 IN | WEIGHT: 220 LBS | BODY MASS INDEX: 32.58 KG/M2

## 2023-03-15 DIAGNOSIS — G40.909 SEIZURE DISORDER (HCC): ICD-10-CM

## 2023-03-15 LAB
ALBUMIN SERPL-MCNC: 3.2 G/DL (ref 3.4–5)
ALBUMIN/GLOB SERPL: 0.9 {RATIO} (ref 1–2)
ALP LIVER SERPL-CCNC: 75 U/L
ALT SERPL-CCNC: 28 U/L
ANION GAP SERPL CALC-SCNC: 5 MMOL/L (ref 0–18)
AST SERPL-CCNC: 21 U/L (ref 15–37)
BASOPHILS # BLD AUTO: 0.03 X10(3) UL (ref 0–0.2)
BASOPHILS NFR BLD AUTO: 0.4 %
BILIRUB SERPL-MCNC: 0.5 MG/DL (ref 0.1–2)
BUN BLD-MCNC: 25 MG/DL (ref 7–18)
BUN/CREAT SERPL: 16 (ref 10–20)
CALCIUM BLD-MCNC: 8.8 MG/DL (ref 8.5–10.1)
CHLORIDE SERPL-SCNC: 111 MMOL/L (ref 98–112)
CO2 SERPL-SCNC: 30 MMOL/L (ref 21–32)
CREAT BLD-MCNC: 1.56 MG/DL
DEPRECATED RDW RBC AUTO: 40 FL (ref 35.1–46.3)
EOSINOPHIL # BLD AUTO: 0.11 X10(3) UL (ref 0–0.7)
EOSINOPHIL NFR BLD AUTO: 1.5 %
ERYTHROCYTE [DISTWIDTH] IN BLOOD BY AUTOMATED COUNT: 12.3 % (ref 11–15)
FASTING STATUS PATIENT QL REPORTED: NO
GFR SERPLBLD BASED ON 1.73 SQ M-ARVRAT: 41 ML/MIN/1.73M2 (ref 60–?)
GLOBULIN PLAS-MCNC: 3.4 G/DL (ref 2.8–4.4)
GLUCOSE BLD-MCNC: 89 MG/DL (ref 70–99)
HCT VFR BLD AUTO: 41.7 %
HGB BLD-MCNC: 13.8 G/DL
IMM GRANULOCYTES # BLD AUTO: 0.04 X10(3) UL (ref 0–1)
IMM GRANULOCYTES NFR BLD: 0.5 %
LYMPHOCYTES # BLD AUTO: 1.42 X10(3) UL (ref 1–4)
LYMPHOCYTES NFR BLD AUTO: 19.4 %
MCH RBC QN AUTO: 29.7 PG (ref 26–34)
MCHC RBC AUTO-ENTMCNC: 33.1 G/DL (ref 31–37)
MCV RBC AUTO: 89.7 FL
MONOCYTES # BLD AUTO: 0.78 X10(3) UL (ref 0.1–1)
MONOCYTES NFR BLD AUTO: 10.7 %
NEUTROPHILS # BLD AUTO: 4.94 X10 (3) UL (ref 1.5–7.7)
NEUTROPHILS # BLD AUTO: 4.94 X10(3) UL (ref 1.5–7.7)
NEUTROPHILS NFR BLD AUTO: 67.5 %
OSMOLALITY SERPL CALC.SUM OF ELEC: 306 MOSM/KG (ref 275–295)
PLATELET # BLD AUTO: 226 10(3)UL (ref 150–450)
POTASSIUM SERPL-SCNC: 4.5 MMOL/L (ref 3.5–5.1)
PROT SERPL-MCNC: 6.6 G/DL (ref 6.4–8.2)
RBC # BLD AUTO: 4.65 X10(6)UL
SODIUM SERPL-SCNC: 146 MMOL/L (ref 136–145)
VALPROATE SERPL-MCNC: 37.7 UG/ML (ref 50–100)
WBC # BLD AUTO: 7.3 X10(3) UL (ref 4–11)

## 2023-03-15 PROCEDURE — 80053 COMPREHEN METABOLIC PANEL: CPT | Performed by: OTHER

## 2023-03-15 PROCEDURE — 80164 ASSAY DIPROPYLACETIC ACD TOT: CPT

## 2023-03-15 PROCEDURE — 80177 DRUG SCRN QUAN LEVETIRACETAM: CPT

## 2023-03-15 PROCEDURE — 36415 COLL VENOUS BLD VENIPUNCTURE: CPT

## 2023-03-15 PROCEDURE — 99214 OFFICE O/P EST MOD 30 MIN: CPT | Performed by: OTHER

## 2023-03-15 PROCEDURE — 85025 COMPLETE CBC W/AUTO DIFF WBC: CPT | Performed by: OTHER

## 2023-03-15 RX ORDER — DIVALPROEX SODIUM 500 MG/1
500 TABLET, EXTENDED RELEASE ORAL DAILY
Qty: 90 TABLET | Refills: 3 | Status: SHIPPED | OUTPATIENT
Start: 2023-03-15

## 2023-03-15 RX ORDER — LEVETIRACETAM 750 MG/1
750 TABLET ORAL 2 TIMES DAILY
Qty: 180 TABLET | Refills: 3 | Status: SHIPPED | OUTPATIENT
Start: 2023-03-15

## 2023-03-16 ENCOUNTER — TELEPHONE (OUTPATIENT)
Dept: NEUROLOGY | Facility: CLINIC | Age: 88
End: 2023-03-16

## 2023-03-16 LAB — LEVETIRACETAM (KEPPRA): 43 UG/ML

## 2023-03-16 NOTE — TELEPHONE ENCOUNTER
I spoke with Donovan Sterling and informed her of the results. Donovan Sterling verbalized understanding. Donovan Sterling stated that there have been no missed doses, and that the patient is vigilant about taking his meds daily. Message to Dr. Sylwia Avery as Tone Jeter. Reviewed and electronically signed by:  [address], Count includes the [facility]

## 2023-03-16 NOTE — TELEPHONE ENCOUNTER
----- Message from Mary Wood MD sent at 3/16/2023  8:34 AM CDT -----  Please let the patient know that r his Depakote level was slightly low. However but also please clarify if he is forgetting to take his medications any time. He still well controlled with seizures and therefore would not necessarily suggest to change the dose.   In the meantime his kidney function apparently is getting slightly worse, make sure he follows up with his primary care doctor about the    Thank you

## 2023-04-04 ENCOUNTER — PATIENT MESSAGE (OUTPATIENT)
Dept: NEUROLOGY | Facility: CLINIC | Age: 88
End: 2023-04-04

## 2023-04-04 NOTE — TELEPHONE ENCOUNTER
From: Nilsa Saxena  To: Rupinder Marmolejo MD  Sent: 4/4/2023 10:12 AM CDT  Subject: Ravi Haro for 3/15 visit    I received a bill for $26.23. This should be covered by my [employer] supplement policy #[id number]. Please contact them for payment.   Monisha Zhang 11/21/1929

## 2023-04-10 NOTE — TELEPHONE ENCOUNTER
Protocol failed or has No Protocol, please review  Requested Prescriptions   Pending Prescriptions Disp Refills    METOPROLOL SUCCINATE ER 50 MG Oral Tablet 24 Hr [Pharmacy Med Name: Metoprolol Succinate Er 24hr 50 Mg Tab Nort] 90 tablet 0     Sig: Take 1 tablet (50 mg total) by mouth daily. Hypertensive Medications Protocol Failed - 4/10/2023  1:31 AM        Failed - EGFRCR or GFRNAA > 50     GFR Evaluation  EGFRCR: 41 , resulted on 3/15/2023          Passed - In person appointment in the past 12 or next 3 months     Recent Outpatient Visits              3 weeks ago Seizure disorder [facility])    Evangelina Michelle, [address], Haroon Vazquez MD    Office Visit    2 months ago ARMD (age-related macular degeneration), bilateral    [facility], [address] [place] [address] Jimena Rojas MD    Office Visit    3 months ago Prostate cancer [facility])    Evangelina Michelle, [facility]    Office Visit    5 months ago Chronic kidney disease, stage II (mild)    [facility], [address] [place], [place] [address]Aureliano MD    Office Visit    8 months ago Impacted cerumen of both Yohana Pedroza MD    Office Visit          Future Appointments         Provider Department Appt Notes    In 4 days NEERAJ Alan Ashleyberg check dark spots    In 1 month MD Evangelina Mahmood, [address] [place] [address] Blood pressure. Kidney function. In 3 months MD Evangelina Fernández, [address], [place] Follow up prostrate concerns.                Passed - Last BP reading less than 140/90     BP Readings from Last 1 Encounters:  02/08/23 : 126/60              Passed - CMP or BMP in past 6 months     Recent Results (from the past 4392 hour(s))   COMP METABOLIC PANEL (14) Collection Time: 03/15/23 10:14 AM   Result Value Ref Range    Glucose 89 70 - 99 mg/dL    Sodium 146 (H) 136 - 145 mmol/L    Potassium 4.5 3.5 - 5.1 mmol/L    Chloride 111 98 - 112 mmol/L    CO2 30.0 21.0 - 32.0 mmol/L    Anion Gap 5 0 - 18 mmol/L    BUN 25 (H) 7 - 18 mg/dL    Creatinine 1.56 (H) 0.70 - 1.30 mg/dL    BUN/CREA Ratio 16.0 10.0 - 20.0    Calcium, Total 8.8 8.5 - 10.1 mg/dL    Calculated Osmolality 306 (H) 275 - 295 mOsm/kg    eGFR-Cr 41 (L) >=60 mL/min/1.73m2    ALT 28 16 - 61 U/L    AST 21 15 - 37 U/L    Alkaline Phosphatase 75 45 - 117 U/L    Bilirubin, Total 0.5 0.1 - 2.0 mg/dL    Total Protein 6.6 6.4 - 8.2 g/dL    Albumin 3.2 (L) 3.4 - 5.0 g/dL    Globulin  3.4 2.8 - 4.4 g/dL    A/G Ratio 0.9 (L) 1.0 - 2.0    Patient Fasting for CMP? No      *Note: Due to a large number of results and/or encounters for the requested time period, some results have not been displayed. A complete set of results can be found in Results Review.                Passed - In person appointment or virtual visit in the past 6 months     Recent Outpatient Visits              3 weeks ago Seizure disorder [facility])    Fern Rothman, [address] [facility],[address], Summer Landa MD    Office Visit    2 months ago ARMD (age-related macular degeneration), bilateral    Reynaldo Corral MD    Office Visit    3 months ago Prostate cancer [facility])    [facility], [address] [facility]    Office Visit    5 months ago Chronic kidney disease, stage II (mild)    [facility] [place] [address] [facility]    Office Visit    8 months ago Impacted cerumen of both ears    Fern Rothman, [address] [facility] Virginia Merrill MD    Office Visit          Future Appointments         Provider Department Appt Notes    In 4 days NEERAJ [facility] Luciano [place], [place] check dark spots    In 1 month Erick Bullock MD [facility], [facility] Blood pressure. Kidney function. In 3 months Hermann Menard MD [address], [address], [place] Follow up prostrate concerns. Future Appointments         Provider Department Appt Notes    In 4 days Norm Rosales PA-C [facility], [facility] check dark spots    In 1 month Erick Bullock MD [address], [facility] Blood pressure. Kidney function. In 3 months Hermann Menard MD [address], [address], [place] Follow up prostrate concerns.           Recent Outpatient Visits              3 weeks ago Seizure disorder [facility])    Marcio Denton, Pankaj Hastings MD    Office Visit    2 months ago ARMD (age-related macular degeneration), bilateral    Daya Villafuerte MD    Office Visit    3 months ago Prostate cancer [facility])    Melissa Oseguera MD    Office Visit    5 months ago Chronic kidney disease, stage II (mild)    Melissa Oseguera MD    Office Visit    8 months ago Impacted cerumen of both Devinyue Odell, Christiano Ch MD    Office Visit

## 2023-04-11 RX ORDER — METOPROLOL SUCCINATE 50 MG/1
50 TABLET, EXTENDED RELEASE ORAL DAILY
Qty: 90 TABLET | Refills: 0 | Status: SHIPPED | OUTPATIENT
Start: 2023-04-11

## 2023-04-14 ENCOUNTER — OFFICE VISIT (OUTPATIENT)
Dept: DERMATOLOGY CLINIC | Facility: CLINIC | Age: 88
End: 2023-04-14

## 2023-04-14 DIAGNOSIS — L82.1 SEBORRHEIC KERATOSIS: Primary | ICD-10-CM

## 2023-04-14 PROCEDURE — 17110 DESTRUCTION B9 LES UP TO 14: CPT | Performed by: PHYSICIAN ASSISTANT

## 2023-04-14 PROCEDURE — 99203 OFFICE O/P NEW LOW 30 MIN: CPT | Performed by: PHYSICIAN ASSISTANT

## 2023-05-11 ENCOUNTER — LAB ENCOUNTER (OUTPATIENT)
Dept: LAB | Age: 88
End: 2023-05-11
Attending: FAMILY MEDICINE
Payer: MEDICARE

## 2023-05-11 ENCOUNTER — OFFICE VISIT (OUTPATIENT)
Dept: FAMILY MEDICINE CLINIC | Facility: CLINIC | Age: 88
End: 2023-05-11

## 2023-05-11 VITALS
BODY MASS INDEX: 33.47 KG/M2 | HEIGHT: 69 IN | RESPIRATION RATE: 18 BRPM | OXYGEN SATURATION: 97 % | SYSTOLIC BLOOD PRESSURE: 130 MMHG | WEIGHT: 226 LBS | HEART RATE: 53 BPM | DIASTOLIC BLOOD PRESSURE: 60 MMHG

## 2023-05-11 DIAGNOSIS — N28.9 RENAL INSUFFICIENCY: ICD-10-CM

## 2023-05-11 DIAGNOSIS — N28.9 RENAL INSUFFICIENCY: Primary | ICD-10-CM

## 2023-05-11 LAB
ANION GAP SERPL CALC-SCNC: 6 MMOL/L (ref 0–18)
BUN BLD-MCNC: 29 MG/DL (ref 7–18)
BUN/CREAT SERPL: 19.3 (ref 10–20)
CALCIUM BLD-MCNC: 9.1 MG/DL (ref 8.5–10.1)
CHLORIDE SERPL-SCNC: 112 MMOL/L (ref 98–112)
CO2 SERPL-SCNC: 27 MMOL/L (ref 21–32)
CREAT BLD-MCNC: 1.5 MG/DL
FASTING STATUS PATIENT QL REPORTED: NO
GFR SERPLBLD BASED ON 1.73 SQ M-ARVRAT: 43 ML/MIN/1.73M2 (ref 60–?)
GLUCOSE BLD-MCNC: 105 MG/DL (ref 70–99)
OSMOLALITY SERPL CALC.SUM OF ELEC: 306 MOSM/KG (ref 275–295)
POTASSIUM SERPL-SCNC: 3.8 MMOL/L (ref 3.5–5.1)
SODIUM SERPL-SCNC: 145 MMOL/L (ref 136–145)

## 2023-05-11 PROCEDURE — 36415 COLL VENOUS BLD VENIPUNCTURE: CPT

## 2023-05-11 PROCEDURE — 1126F AMNT PAIN NOTED NONE PRSNT: CPT | Performed by: FAMILY MEDICINE

## 2023-05-11 PROCEDURE — 80048 BASIC METABOLIC PNL TOTAL CA: CPT

## 2023-05-11 PROCEDURE — 99213 OFFICE O/P EST LOW 20 MIN: CPT | Performed by: FAMILY MEDICINE

## 2023-07-06 RX ORDER — CHOLECALCIFEROL (VITAMIN D3) 125 MCG
2000 CAPSULE ORAL DAILY
Qty: 90 TABLET | Refills: 3 | Status: SHIPPED | OUTPATIENT
Start: 2023-07-06

## 2023-07-06 NOTE — TELEPHONE ENCOUNTER
Please review. Protocol failed / No protocol. Reported external by patient. Requested Prescriptions   Pending Prescriptions Disp Refills    CHOLECALCIFEROL 50 MCG (2000 UT) Oral Tab [Pharmacy Med Name: Vitamin D3 50 Mcg Tab Rugb] 90 tablet 0     Sig: TAKE ONE TABLET BY MOUTH ONE TIME DAILY       There is no refill protocol information for this order         Recent Outpatient Visits              1 month ago Renal insufficiency    Levar Grijalva MD    Office Visit    2 months ago Seborrheic keratosis    Nayan Ottoniel Forbes Paphos, PA-C    Office Visit    3 months ago Seizure disorder [facility])    [address], [address], Maryhelen Alpers, MD    Office Visit    4 months ago ARMD (age-related macular degeneration), bilateral    Levar Grijalva MD    Office Visit    6 months ago Prostate cancer [facility])    Rosemarie Davenport MD    Office Visit           Future Appointments         Provider Department Appt Notes    In 4 days Radha Carroll MD [address], [address], [place] Follow up prostrate concerns.

## 2023-07-08 RX ORDER — METOPROLOL SUCCINATE 50 MG/1
50 TABLET, EXTENDED RELEASE ORAL DAILY
Qty: 90 TABLET | Refills: 0 | Status: SHIPPED | OUTPATIENT
Start: 2023-07-08

## 2023-07-08 NOTE — TELEPHONE ENCOUNTER
Please review. Protocol Failed or has no Protocol    Requested Prescriptions   Pending Prescriptions Disp Refills    METOPROLOL SUCCINATE ER 50 MG Oral Tablet 24 Hr [Pharmacy Med Name: Metoprolol Succinate Er 24hr 50 Mg Tab Nort] 90 tablet 0     Sig: Take 1 tablet (50 mg total) by mouth daily. Hypertensive Medications Protocol Failed - 7/8/2023 10:00 AM        Failed - EGFRCR or GFRNAA > 50     GFR Evaluation  EGFRCR: 43 , resulted on 5/11/2023          Passed - In person appointment in the past 12 or next 3 months     Recent Outpatient Visits              1 month ago Renal insufficiency    David Sahni MD    Office Visit    2 months ago Seborrheic keratosis    Ottoniel Abdalla Paphos, PA-C    Office Visit    3 months ago Seizure disorder [facility])    [address], [address] [facility],[address], Tiffany Odom MD    Office Visit    5 months ago ARMD (age-related macular degeneration), bilateral    David Sahni MD    Office Visit    6 months ago Prostate cancer [facility])    [address], [address] [facility], Elvis Garcia MD    Office Visit          Future Appointments         Provider Department Appt Notes    In 2 days Alexandria Mcgowan MD [address], [address], [place] Follow up prostrate concerns.                Passed - Last BP reading less than 140/90     BP Readings from Last 1 Encounters:  05/11/23 : 130/60              Passed - CMP or BMP in past 6 months     Recent Results (from the past 4392 hour(s))   BASIC METABOLIC PANEL (8)    Collection Time: 05/11/23  8:19 AM   Result Value Ref Range    Glucose 105 (H) 70 - 99 mg/dL    Sodium 145 136 - 145 mmol/L    Potassium 3.8 3.5 - 5.1 mmol/L    Chloride 112 98 - 112 mmol/L    CO2 27.0 21.0 - 32.0 mmol/L    Anion Gap 6 0 - 18 mmol/L    BUN 29 (H) 7 - 18 mg/dL    Creatinine 1.50 (H) 0.70 - 1.30 mg/dL    BUN/CREA Ratio 19.3 10.0 - 20.0    Calcium, Total 9.1 8.5 - 10.1 mg/dL    Calculated Osmolality 306 (H) 275 - 295 mOsm/kg    eGFR-Cr 43 (L) >=60 mL/min/1.73m2    Patient Fasting for BMP? No      *Note: Due to a large number of results and/or encounters for the requested time period, some results have not been displayed. A complete set of results can be found in Results Review. Passed - In person appointment or virtual visit in the past 6 months     Recent Outpatient Visits              1 month ago Renal insufficiency    Abbey Sutherland MD    Office Visit    2 months ago Seborrheic keratosis    [address] [facility], Cristiano Baeza PA-C    Office Visit    3 months ago Seizure disorder [facility])    [address], [address] [facility],[address], Lashon Lazo MD    Office Visit    5 months ago ARMD (age-related macular degeneration), bilateral    Abbey Sutherland MD    Office Visit    6 months ago Prostate cancer [facility])    Tatum Husain MD    Office Visit          Future Appointments         Provider Department Appt Notes    In 2 days Dontae Sutherland MD [address], [address], [place] Follow up prostrate concerns.

## 2023-07-09 NOTE — TELEPHONE ENCOUNTER
Please review. Protocol Failed or has No Protocol. Requested Prescriptions   Pending Prescriptions Disp Refills    AMLODIPINE 5 MG Oral Tab [Pharmacy Med Name: Amlodipine Besylate 5 Mg Tab Unic] 90 tablet 0     Sig: Take 1 tablet (5 mg total) by mouth daily. Hypertensive Medications Protocol Failed - 7/8/2023  1:03 PM        Failed - EGFRCR or GFRNAA > 50     GFR Evaluation  EGFRCR: 43 , resulted on 5/11/2023          Passed - In person appointment in the past 12 or next 3 months     Recent Outpatient Visits              1 month ago Renal insufficiency    Curt Laguna MD    Office Visit    2 months ago Seborrheic keratosis    [address] [facility], Cristiano Baeza PA-C    Office Visit    3 months ago Seizure disorder [facility])    [address], [address] [facility],[address], Dipikajoanne Sanchez MD    Office Visit    5 months ago ARMD (age-related macular degeneration), bilateral    Curt Laguna MD    Office Visit    6 months ago Prostate cancer [facility])    [address], [address] [facility], Heraclio Santillan MD    Office Visit          Future Appointments         Provider Department Appt Notes    Tomorrow Skye Cassidy MD [address], [address], [place] Follow up prostrate concerns.                Passed - Last BP reading less than 140/90     BP Readings from Last 1 Encounters:  05/11/23 : 130/60              Passed - CMP or BMP in past 6 months     Recent Results (from the past 4392 hour(s))   BASIC METABOLIC PANEL (8)    Collection Time: 05/11/23  8:19 AM   Result Value Ref Range    Glucose 105 (H) 70 - 99 mg/dL    Sodium 145 136 - 145 mmol/L    Potassium 3.8 3.5 - 5.1 mmol/L    Chloride 112 98 - 112 mmol/L    CO2 27.0 21.0 - 32.0 mmol/L    Anion Gap 6 0 - 18 mmol/L    BUN 29 (H) 7 - 18 mg/dL    Creatinine 1.50 (H) 0.70 - 1.30 mg/dL    BUN/CREA Ratio 19.3 10.0 - 20.0    Calcium, Total 9.1 8.5 - 10.1 mg/dL    Calculated Osmolality 306 (H) 275 - 295 mOsm/kg    eGFR-Cr 43 (L) >=60 mL/min/1.73m2    Patient Fasting for BMP? No      *Note: Due to a large number of results and/or encounters for the requested time period, some results have not been displayed. A complete set of results can be found in Results Review. Passed - In person appointment or virtual visit in the past 6 months     Recent Outpatient Visits              1 month ago Renal insufficiency    Ella Thompson MD    Office Visit    2 months ago Seborrheic keratosis    [address] [facility], Cristiano Baeza PARADHA    Office Visit    3 months ago Seizure disorder [facility])    [address], [address] [facility],3R[address] Floor, Madi Morelos MD    Office Visit    5 months ago ARMD (age-related macular degeneration), bilateral    Ella Thompson MD    Office Visit    6 months ago Prostate cancer [facility])    [address], [address] [facility], Janine Cowan, Sonja Calloway MD    Office Visit          Future Appointments         Provider Department Appt Notes    Tomorrow Susan Roa MD [address], [address], [place] Follow up prostrate concerns.                     Recent Outpatient Visits              1 month ago Renal insufficiency    Ella Thompson MD    Office Visit    2 months ago Seborrheic keratosis    [address] [facility], Cristiano Baeza, [employer]    Office Visit    3 months ago Seizure disorder [facility])    [address] [facility], Madi Morelos MD    Office Visit    5 months ago ARMD (age-related macular degeneration), bilateral    [facility]    Office Visit    6 months ago Prostate cancer [facility])    [address], [address] [facility], Penelope Jessica MD    Office Visit            Future Appointments         Provider Department Appt Notes    Tomorrow Dontae Sutherland MD [address], [address], [place] Follow up prostrate concerns.

## 2023-07-10 ENCOUNTER — OFFICE VISIT (OUTPATIENT)
Dept: SURGERY | Facility: CLINIC | Age: 88
End: 2023-07-10

## 2023-07-10 DIAGNOSIS — C61 PROSTATE CANCER (HCC): ICD-10-CM

## 2023-07-10 DIAGNOSIS — D49.519 RENAL NEOPLASM: Primary | ICD-10-CM

## 2023-07-10 PROCEDURE — 99213 OFFICE O/P EST LOW 20 MIN: CPT | Performed by: UROLOGY

## 2023-07-10 NOTE — PROGRESS NOTES
Mary Jane Santos is a 80year old male. HPI:   Patient presents with: Follow - Up      80year-old male in follow-up to a visit April 5, 2022. Has a history of prostate cancer favorable risk diagnosed 2011 treated with CyberKnife radiation therapy most recent PSA February 8, 2023 0.07, history of left renal cell carcinoma clear type nuclear grade 1 status post left cryoablation August 2021 most recent MRI August 2022 showing mostly stable findings with plans for repeat MRI August of this year. Feels well. No problems or concerns. IPSS score 6 quality-of-life index. HISTORY:  Past Medical History:   Diagnosis Date    Arthritis     Cancer ([address]     CME (cystoid macular edema) 4/18/14    IVK injections by Dr. Julieta Mckeon of eyelid 12/4/2014    H/O prostate biopsy 2011    Hearing impairment     High cholesterol     History of vitrectomy 9/21/2016    Hyperlipidemia     Macular degeneration     Prostate cancer ([address]     Pseudophakia of both eyes 12/4/2014    Seizure disorder ([address]     TIA (transient ischemic attack)     Unspecified essential hypertension     Vitreous floaters of right eye 12/4/2014      Past Surgical History:   Procedure Laterality Date    APPENDECTOMY      BEVACIZUMAB, 10MG Bilateral 2021    Avastin injection 9/16/21 OU by Dr. Chaim Nieves Right 2/17/14    RJM    CATARACT EXTRACTION W/  INTRAOCULAR LENS IMPLANT Left 5/3/10    RJM    CHOLECYSTECTOMY      COLONOSCOPY      Per NG: adenoma    KNEE REPLACEMENT SURGERY Left approx.  2006    OTHER SURGICAL HISTORY Right 2014    Per NG: Post op CME -- IVKcryo kidney tumor    VITRECTOMY,MECHANICAL Left 9/1/10    for macular pucker with CME with Dr. Iris Spatz Right 5/18/16    Dr. Annalisa Jones / 2010     Lily Mis with CME      Family History   Problem Relation Age of Onset    Diabetes Neg     Glaucoma Neg     Macular degeneration Neg       Social History:   Social History     Socioeconomic History    Marital status:    Tobacco Use    Smoking status: Former     Types: Cigarettes     Quit date: 1960     Years since quittin.4    Smokeless tobacco: Never   Vaping Use    Vaping Use: Never used   Substance and Sexual Activity    Alcohol use: No     Alcohol/week: 0.0 standard drinks of alcohol    Drug use: No   Other Topics Concern    Caffeine Concern Yes     Comment: 1-2 cups coffee per day    Exercise Yes     Comment: Walks to the store, 1 mile, twice per week    Pt has a pacemaker No    Pt has a defibrillator No    Reaction to local anesthetic No   Social History Narrative    The patient does not use an assistive device. .      The patient does live in a home with stairs. Medications (Active prior to today's visit):  Current Outpatient Medications   Medication Sig Dispense Refill    metoprolol succinate ER 50 MG Oral Tablet 24 Hr Take 1 tablet (50 mg total) by mouth daily. 90 tablet 0    cholecalciferol 50 MCG (2000 UT) Oral Tab Take 1 tablet (2,000 Units total) by mouth daily. 90 tablet 3    levothyroxine 50 MCG Oral Tab Take 1 tablet (50 mcg total) by mouth before breakfast. 90 tablet 3    divalproex  MG Oral Tablet 24 Hr Take 1 tablet (500 mg total) by mouth daily. 90 tablet 3    levetiracetam 750 MG Oral Tab Take 1 tablet (750 mg total) by mouth 2 (two) times daily. 180 tablet 3    amLODIPine 5 MG Oral Tab Take 1 tablet (5 mg total) by mouth daily. 90 tablet 1    LISINOPRIL 10 MG Oral Tab Take 1 tablet (10 mg total) by mouth 2 (two) times a day. 180 tablet 0    Magnesium 100 MG Oral Tab Take by mouth. aspirin 81 MG Oral Tab EC Take 1 tablet (81 mg total) by mouth daily.          Allergies:  No Known Allergies      ROS:       PHYSICAL EXAM:        ASSESSMENT/PLAN:   Assessment   Renal neoplasm  (primary encounter diagnosis)  Prostate cancer (hcc)    Recommend:  - Continue to follow-up with interventional radiology for previous renal cell carcinoma. - Can follow-up with us otherwise on a as needed basis. Orders This Visit:  No orders of the defined types were placed in this encounter.       Meds This Visit:  Requested Prescriptions      No prescriptions requested or ordered in this encounter       Imaging & Referrals:  None     7/10/2023  Li Nichols MD

## 2023-07-12 RX ORDER — AMLODIPINE BESYLATE 5 MG/1
5 TABLET ORAL DAILY
Qty: 90 TABLET | Refills: 0 | Status: SHIPPED | OUTPATIENT
Start: 2023-07-12

## 2023-07-12 NOTE — TELEPHONE ENCOUNTER
Please review. Protocol Failed or has No Protocol. Requested Prescriptions   Pending Prescriptions Disp Refills    amLODIPine 5 MG Oral Tab 90 tablet 1     Sig: Take 1 tablet (5 mg total) by mouth daily.        Hypertensive Medications Protocol Failed - 7/11/2023  7:27 PM        Failed - EGFRCR or GFRNAA > 50     GFR Evaluation  EGFRCR: 43 , resulted on 5/11/2023          Passed - In person appointment in the past 12 or next 3 months     Recent Outpatient Visits              2 days ago Renal neoplasm    Yennifer Villalpando, [address], Trinidad Corado MD    Office Visit    2 months ago Renal insufficiency    Josefina Romero MD    Office Visit    2 months ago Seborrheic keratosis    [address] [facility], Cristiano Baeza PA-C    Office Visit    3 months ago Seizure disorder [facility])    Yennifer Villalpando, [address], Amirah Carrasco MD    Office Visit    5 months ago ARMD (age-related macular degeneration), bilateral    [facility]    Office Visit          Future Appointments         Provider Department Appt Notes    In 3 weeks Chi Bell DPM [facility], [address], [place] toenail issues, scheduled by daughter Izaiah Mendoza    In 1 month [facility] MRI RM1 (1.5T) [id number] [facility]                Passed - Last BP reading less than 140/90     BP Readings from Last 1 Encounters:  05/11/23 : 130/60              Passed - CMP or BMP in past 6 months     Recent Results (from the past 4392 hour(s))   BASIC METABOLIC PANEL (8)    Collection Time: 05/11/23  8:19 AM   Result Value Ref Range    Glucose 105 (H) 70 - 99 mg/dL    Sodium 145 136 - 145 mmol/L    Potassium 3.8 3.5 - 5.1 mmol/L    Chloride 112 98 - 112 mmol/L    CO2 27.0 21.0 - 32.0 mmol/L    Anion Gap 6 0 - 18 mmol/L    BUN 29 (H) 7 - 18 mg/dL    Creatinine 1.50 (H) 0.70 - 1.30 mg/dL    BUN/CREA Ratio 19.3 10.0 - 20.0    Calcium, Total 9.1 8.5 - 10.1 mg/dL    Calculated Osmolality 306 (H) 275 - 295 mOsm/kg    eGFR-Cr 43 (L) >=60 mL/min/1.73m2    Patient Fasting for BMP? No      *Note: Due to a large number of results and/or encounters for the requested time period, some results have not been displayed. A complete set of results can be found in Results Review.                Passed - In person appointment or virtual visit in the past 6 months     Recent Outpatient Visits              2 days ago Renal neoplasm    [address], [address], Trinidad Corado MD    Office Visit    2 months ago Renal insufficiency    Josefina Romero MD    Office Visit    2 months ago Seborrheic keratosis    Ottoniel Carcamo Paphos, PA-C    Office Visit    3 months ago Seizure disorder [facility])    [address], [address], Amirah Carrasco MD    Office Visit    5 months ago ARMD (age-related macular degeneration), bilateral    Josefina Romero MD    Office Visit          Future Appointments         Provider Department Appt Notes    In 3 weeks Chi Bell DPM [facility], [address], [place] toenail issues, scheduled by daughter Izaiah Mendoza    In 1 month [facility] MRI RM1 (1.5T) [id number] [facility]                     Recent Outpatient Visits              2 days ago Renal neoplasm    Indu Lock MD    Office Visit    2 months ago Renal insufficiency    Josefina Romero MD    Office Visit    2 months ago Seborrheic keratosis    [address], [address], [place], [place], Donnald Sark Office Visit    3 months ago Seizure disorder [facility])    [facility], [address], Tiffany Odom MD    Office Visit    5 months ago ARMD (age-related macular degeneration), bilateral    David Sahni MD    Office Visit            Future Appointments         Provider Department Appt Notes    In 3 weeks Geoffrey Huerta DPM [facility], [address], [place] toenail issues, scheduled by daughter Donovan Sterling    In 1 month [facility] MRI RM1 (1.5T) [id number] [facility]

## 2023-08-08 ENCOUNTER — OFFICE VISIT (OUTPATIENT)
Dept: PODIATRY CLINIC | Facility: CLINIC | Age: 88
End: 2023-08-08

## 2023-08-08 DIAGNOSIS — M79.671 PAIN IN BOTH FEET: ICD-10-CM

## 2023-08-08 DIAGNOSIS — L60.3 NAIL DYSTROPHY: Primary | ICD-10-CM

## 2023-08-08 DIAGNOSIS — H35.30 ARMD (AGE-RELATED MACULAR DEGENERATION), BILATERAL: ICD-10-CM

## 2023-08-08 DIAGNOSIS — B35.1 ONYCHOMYCOSIS: ICD-10-CM

## 2023-08-08 DIAGNOSIS — M79.672 PAIN IN BOTH FEET: ICD-10-CM

## 2023-08-08 PROCEDURE — 99213 OFFICE O/P EST LOW 20 MIN: CPT | Performed by: STUDENT IN AN ORGANIZED HEALTH CARE EDUCATION/TRAINING PROGRAM

## 2023-08-08 PROCEDURE — 1126F AMNT PAIN NOTED NONE PRSNT: CPT | Performed by: STUDENT IN AN ORGANIZED HEALTH CARE EDUCATION/TRAINING PROGRAM

## 2023-08-08 NOTE — PROGRESS NOTES
[facility], [facility]  Progress Note      Arvin Salcido is a 80year old male. Patient presents with:  Toenail Care: Patient is here for toenail care. Patient denies any pain at this time. HPI:       Pt is a 79 y/o male with macular degeneration PTC for yuridia foot evaluation. Pt is unable to trim his own toenails due to macular degeneration. He presents to clinic today accompanied by his daughter. Allergies: Patient has no known allergies. Current Outpatient Medications   Medication Sig Dispense Refill    amLODIPine 5 MG Oral Tab Take 1 tablet (5 mg total) by mouth daily. 90 tablet 0    amLODIPine 5 MG Oral Tab Take 1 tablet (5 mg total) by mouth daily. 90 tablet 0    metoprolol succinate ER 50 MG Oral Tablet 24 Hr Take 1 tablet (50 mg total) by mouth daily. 90 tablet 0    cholecalciferol 50 MCG (2000 UT) Oral Tab Take 1 tablet (2,000 Units total) by mouth daily. 90 tablet 3    levothyroxine 50 MCG Oral Tab Take 1 tablet (50 mcg total) by mouth before breakfast. 90 tablet 3    divalproex  MG Oral Tablet 24 Hr Take 1 tablet (500 mg total) by mouth daily. 90 tablet 3    levetiracetam 750 MG Oral Tab Take 1 tablet (750 mg total) by mouth 2 (two) times daily. 180 tablet 3    LISINOPRIL 10 MG Oral Tab Take 1 tablet (10 mg total) by mouth 2 (two) times a day. 180 tablet 0    Magnesium 100 MG Oral Tab Take by mouth. aspirin 81 MG Oral Tab EC Take 1 tablet (81 mg total) by mouth daily.         Past Medical History:   Diagnosis Date    Arthritis     Cancer [facility])     CME (cystoid macular edema) 4/18/14    IVK injections by Dr. Belkis Echeverria of eyelid 12/4/2014    H/O prostate biopsy 2011    Hearing impairment     High cholesterol     History of vitrectomy 9/21/2016    Hyperlipidemia     Macular degeneration     Prostate cancer ([facility] [address]     Pseudophakia of both eyes 12/4/2014    Seizure disorder ([facility] [address]     TIA (transient ischemic attack)     Unspecified essential hypertension Vitreous floaters of right eye 2014      Past Surgical History:   Procedure Laterality Date    APPENDECTOMY      BEVACIZUMAB, 10MG Bilateral     Avastin injection 21 OU by Dr. Kyaw Renteria Right 14    RJM    CATARACT EXTRACTION W/  INTRAOCULAR LENS IMPLANT Left 5/3/10    RJM    CHOLECYSTECTOMY      COLONOSCOPY      Per NG: adenoma    KNEE REPLACEMENT SURGERY Left approx.     OTHER SURGICAL HISTORY Right     Per NG: Post op CME -- IVKcryo kidney tumor    VITRECTOMY,MECHANICAL Left 9/1/10    for macular pucker with CME with Dr. Betty Aj Right 16    Dr. Betty Aj Left 1997     Ana Null with CME      Family History   Problem Relation Age of Onset    Diabetes Neg     Glaucoma Neg     Macular degeneration Neg       Social History    Socioeconomic History      Marital status:     Tobacco Use      Smoking status: Former        Types: Cigarettes        Quit date: 1960        Years since quittin.5      Smokeless tobacco: Never    Vaping Use      Vaping Use: Never used    Substance and Sexual Activity      Alcohol use: No        Alcohol/week: 0.0 standard drinks of alcohol      Drug use: No    Other Topics      Concerns:        Caffeine Concern: Yes          1-2 cups coffee per day        Exercise: Yes          Walks to the store, 1 mile, twice per week        Pt has a pacemaker: No        Pt has a defibrillator: No        Reaction to local anesthetic: No          REVIEW OF SYSTEMS:     Denies nause, fever, chills  No calf pain  Denies chest pain or SOB      EXAM:   There were no vitals taken for this visit. GENERAL: well developed, well nourished, in no apparent distress  EXTREMITIES:   1. Integument: Normal skin temperature and turgor. Toenails x10 are elongated, thickened and discolored with subungal derbi.      2. Vascular: Dorsalis pedis two out of four bilateral and posterior tibial pulses two out of   four bilateral, capillary refill normal.   3. Musculoskeletal: All muscle groups are graded 5 out of 5 in the foot and ankle. 4. Neurological: Normal sharp dull sensation; reflexes normal.             ASSESSMENT AND PLAN:   Diagnoses and all orders for this visit:    Nail dystrophy    ARMD (age-related macular degeneration), bilateral    Onychomycosis    Pain in both feet        Plan:       -Patient examined, chart history reviewed. -Discussed importance of proper pedal hygiene, regular foot checks, and tight glucose control.  -Sharply debrided nails x10 with a sterile nail nipper achieving a 20% reduction in thickness and length. Mild bleeding to left 5th digit. Hemostasis achieved with pressure and digit dresssed with neosporin and bandaid.  -Ambulate with supportive shoes and inserts and avoid walking barefoot.  -Educated patient on acute signs of infection advised patient to seek immediate medical attention if symptoms arise. The patient indicates understanding of these issues and agrees to the plan.         Alejandra Winchester DPM

## 2023-08-15 ENCOUNTER — HOSPITAL ENCOUNTER (OUTPATIENT)
Dept: MRI IMAGING | Facility: HOSPITAL | Age: 88
Discharge: HOME | End: 2023-08-15
Attending: RADIOLOGY
Payer: MEDICARE

## 2023-08-15 DIAGNOSIS — C64.9 RENAL CELL CARCINOMA (HCC): ICD-10-CM

## 2023-08-15 PROCEDURE — 74183 MRI ABD W/O CNTR FLWD CNTR: CPT | Performed by: RADIOLOGY

## 2023-08-15 PROCEDURE — A9575 INJ GADOTERATE MEGLUMI 0.1ML: HCPCS | Performed by: RADIOLOGY

## 2023-08-15 RX ORDER — GADOTERATE MEGLUMINE 376.9 MG/ML
20 INJECTION INTRAVENOUS
Status: COMPLETED | OUTPATIENT
Start: 2023-08-15 | End: 2023-08-15

## 2023-08-15 RX ADMIN — GADOTERATE MEGLUMINE 20 ML: 376.9 INJECTION INTRAVENOUS at 09:32:00

## 2023-08-21 ENCOUNTER — NURSE TRIAGE (OUTPATIENT)
Dept: FAMILY MEDICINE CLINIC | Facility: CLINIC | Age: 88
End: 2023-08-21

## 2023-08-21 NOTE — TELEPHONE ENCOUNTER
Daughter Popeye Quiñones notified and verbalized understanding. (Verbal release confirmed) She will be taking patient to [facility], still asymptomatic at this time.

## 2023-08-21 NOTE — TELEPHONE ENCOUNTER
If persistent elevation of blood pressure should go to immediate care today or if sig symptoms, ER for evaluation of sig elevation of blood pressure.

## 2023-08-21 NOTE — TELEPHONE ENCOUNTER
Action Requested: Summary for Provider     []  Critical Lab, Recommendations Needed  [x] Need Additional Advice  []   FYI    []   Need Orders  [] Need Medications Sent to Pharmacy  []  Other     SUMMARY: Advised per protocol:  See in office today. Daughter asking message to be sent to provider since patient is not having any symptoms and no available appointments for today. Would like to get a callback before she leaves him for the evening since he will be home by himself. Please advise. Routing to on-site provider for Dr. Cole Marin     Reason for call: Hypertension  Onset: Today     Patient's daughter Eugenia Miller calling - on MERLIN (patient name and  verified)  states patient was at a surgeon's appointment at [facility] for a routine visit and his blood pressure was 197/87 in left arm and 204/69 in the right arm in the office. Patient had told her that his BP usually runs around 150/low 70's and was also high this morning at 180/?. Denies blurry vision, dizziness, difficulty breathing, chest pains, weakness. Denies feeling anything different out of the ordinary. No missed medications.     Current blood pressure medications:  Amlodpine 5 mg Daily  Lisinopril 10 mg BID  Metoprolol ER 50 mg Daily  Aspirin 81 mg daily    Best callback number: Eugenia Miller at  [phone]     Reason for Disposition   Systolic BP >= 852 OR Diastolic >= 261    Protocols used: Blood Pressure - High-A-OH

## 2023-08-22 RX ORDER — LISINOPRIL 10 MG/1
10 TABLET ORAL 2 TIMES DAILY
Qty: 180 TABLET | Refills: 0 | OUTPATIENT
Start: 2023-08-22

## 2023-08-22 NOTE — TELEPHONE ENCOUNTER
LMTCB to ask patient about the refill request to confirm if pt still taking the medication noted last refill was on 1/10/23.

## 2023-09-05 ENCOUNTER — LAB ENCOUNTER (OUTPATIENT)
Dept: LAB | Age: 88
End: 2023-09-05
Attending: FAMILY MEDICINE
Payer: MEDICARE

## 2023-09-05 ENCOUNTER — OFFICE VISIT (OUTPATIENT)
Dept: FAMILY MEDICINE CLINIC | Facility: CLINIC | Age: 88
End: 2023-09-05

## 2023-09-05 VITALS
RESPIRATION RATE: 18 BRPM | HEART RATE: 50 BPM | DIASTOLIC BLOOD PRESSURE: 70 MMHG | SYSTOLIC BLOOD PRESSURE: 144 MMHG | WEIGHT: 223.81 LBS | BODY MASS INDEX: 33.15 KG/M2 | HEIGHT: 69 IN | OXYGEN SATURATION: 97 %

## 2023-09-05 DIAGNOSIS — N28.9 RENAL INSUFFICIENCY: Primary | ICD-10-CM

## 2023-09-05 DIAGNOSIS — N28.9 RENAL INSUFFICIENCY: ICD-10-CM

## 2023-09-05 LAB
ANION GAP SERPL CALC-SCNC: 5 MMOL/L (ref 0–18)
BUN BLD-MCNC: 22 MG/DL (ref 7–18)
BUN/CREAT SERPL: 15.6 (ref 10–20)
CALCIUM BLD-MCNC: 9 MG/DL (ref 8.5–10.1)
CHLORIDE SERPL-SCNC: 109 MMOL/L (ref 98–112)
CO2 SERPL-SCNC: 28 MMOL/L (ref 21–32)
CREAT BLD-MCNC: 1.41 MG/DL
EGFRCR SERPLBLD CKD-EPI 2021: 46 ML/MIN/1.73M2 (ref 60–?)
FASTING STATUS PATIENT QL REPORTED: NO
GLUCOSE BLD-MCNC: 102 MG/DL (ref 70–99)
OSMOLALITY SERPL CALC.SUM OF ELEC: 298 MOSM/KG (ref 275–295)
POTASSIUM SERPL-SCNC: 4.2 MMOL/L (ref 3.5–5.1)
SODIUM SERPL-SCNC: 142 MMOL/L (ref 136–145)

## 2023-09-05 PROCEDURE — 36415 COLL VENOUS BLD VENIPUNCTURE: CPT

## 2023-09-05 PROCEDURE — 1126F AMNT PAIN NOTED NONE PRSNT: CPT | Performed by: FAMILY MEDICINE

## 2023-09-05 PROCEDURE — 80048 BASIC METABOLIC PNL TOTAL CA: CPT

## 2023-09-05 PROCEDURE — 99213 OFFICE O/P EST LOW 20 MIN: CPT | Performed by: FAMILY MEDICINE

## 2023-09-05 RX ORDER — LISINOPRIL 10 MG/1
10 TABLET ORAL 2 TIMES DAILY
Qty: 180 TABLET | Refills: 1 | Status: SHIPPED | OUTPATIENT
Start: 2023-09-05 | End: 2024-08-30

## 2023-09-05 NOTE — PROGRESS NOTES
Subjective:   Patient ID: Mary Jane Santos is a 80year old male. HPI  Patient for f/u hypertension   Denies any chest pain shortness of breath or headaches. Monitoring blood pressure at home and is below 135/85 most of the time . Had one episode taht his bp was 193 in the doctor's office        History/Other:   Review of Systems  Constitutional: Negative. Negative for activity change, appetite change, diaphoresis and fatigue. Respiratory: Negative. Negative for apnea, cough, chest tightness and shortness of breath. Cardiovascular: Negative. Negative for chest pain, palpitations and leg swelling. Gastrointestinal: Negative. Negative for abdominal pain. Current Outpatient Medications   Medication Sig Dispense Refill    lisinopril 10 MG Oral Tab Take 1 tablet (10 mg total) by mouth in the morning and 1 tablet (10 mg total) before bedtime. 180 tablet 1    amLODIPine 5 MG Oral Tab Take 1 tablet (5 mg total) by mouth daily. 90 tablet 0    metoprolol succinate ER 50 MG Oral Tablet 24 Hr Take 1 tablet (50 mg total) by mouth daily. 90 tablet 0    cholecalciferol 50 MCG (2000 UT) Oral Tab Take 1 tablet (2,000 Units total) by mouth daily. 90 tablet 3    levothyroxine 50 MCG Oral Tab Take 1 tablet (50 mcg total) by mouth before breakfast. 90 tablet 3    divalproex  MG Oral Tablet 24 Hr Take 1 tablet (500 mg total) by mouth daily. 90 tablet 3    levetiracetam 750 MG Oral Tab Take 1 tablet (750 mg total) by mouth 2 (two) times daily. 180 tablet 3    Magnesium 100 MG Oral Tab Take by mouth. aspirin 81 MG Oral Tab EC Take 1 tablet (81 mg total) by mouth daily. amLODIPine 5 MG Oral Tab Take 1 tablet (5 mg total) by mouth daily. (Patient not taking: Reported on 9/5/2023) 90 tablet 0     Allergies:No Known Allergies    Objective:   Physical Exam  Constitutional:       Appearance: He is well-developed. Cardiovascular:      Rate and Rhythm: Normal rate and regular rhythm.       Heart sounds: Normal heart sounds. Pulmonary:      Effort: Pulmonary effort is normal.      Breath sounds: Normal breath sounds. Neurological:      Mental Status: He is alert. Deep Tendon Reflexes: Reflexes are normal and symmetric. Assessment & Plan:   Renal insufficiency  (primary encounter diagnosis)  Hypertension cpm   F/u in 3-4 months   Orders Placed This Encounter      Basic Metabolic Panel (8)      Meds This Visit:  Requested Prescriptions     Signed Prescriptions Disp Refills    lisinopril 10 MG Oral Tab 180 tablet 1     Sig: Take 1 tablet (10 mg total) by mouth in the morning and 1 tablet (10 mg total) before bedtime.        Imaging & Referrals:  None

## 2023-10-03 RX ORDER — METOPROLOL SUCCINATE 50 MG/1
50 TABLET, EXTENDED RELEASE ORAL DAILY
Qty: 90 TABLET | Refills: 3 | Status: SHIPPED | OUTPATIENT
Start: 2023-10-03

## 2023-10-03 NOTE — TELEPHONE ENCOUNTER
Please review refill protocol failed/ no protocol  Requested Prescriptions   Pending Prescriptions Disp Refills    METOPROLOL SUCCINATE ER 50 MG Oral Tablet 24 Hr [Pharmacy Med Name: Metoprolol Succinate Er 24hr 50 Mg Tab Nort] 90 tablet 0     Sig: Take 1 tablet by mouth daily.        Hypertensive Medications Protocol Failed - 10/2/2023  1:32 AM        Failed - Last BP reading less than 140/90     BP Readings from Last 1 Encounters:  09/05/23 : 144/70              Failed - EGFRCR or GFRNAA > 50     GFR Evaluation  EGFRCR: 46 , resulted on 9/5/2023          Passed - In person appointment in the past 12 or next 3 months     Recent Outpatient Visits              3 weeks ago Renal insufficiency    Hoda Roberto MD    Office Visit    1 month ago [facility] dyst[facility] [place]    Office Visit    2 months ago Renal neoplasm    [address], [address], Mary Sellers MD    Office Visit    4 months ago Renal insufficiency    Hoda Roberto MD    Office Visit    5 months ago Seborrheic keratosis    [facility], [facility] Cristiano Thompson PA-C    Office Visit          Future Appointments         Provider Department Appt Notes    In 1 month Krunal Mazariegos [facility], [address], [address] UP/pt offered 10/10 appt, preferred later appt    In 2 months Irma Wallace MD [address], [facility] follow up 3 months                    Passed - CMP or BMP in past 6 months     Recent Results (from the past 4392 hour(s))   Basic Metabolic Panel (8)    Collection Time: 09/05/23 12:02 PM   Result Value Ref Range    Glucose 102 (H) 70 - 99 mg/dL    Sodium 142 136 - 145 mmol/L    Potassium 4.2 3.5 - 5.1 mmol/L    Chloride 109 98 - 112 mmol/L    CO2 28.0 21.0 - 32.0 mmol/L    Anion Gap 5 0 - 18 mmol/L    BUN 22 (H) 7 - 18 mg/dL    Creatinine 1.41 (H) 0.70 - 1.30 mg/dL    BUN/CREA Ratio 15.6 10.0 - 20.0    Calcium, Total 9.0 8.5 - 10.1 mg/dL    Calculated Osmolality 298 (H) 275 - 295 mOsm/kg    eGFR-Cr 46 (L) >=60 mL/min/1.73m2    Patient Fasting for BMP? No      *Note: Due to a large number of results and/or encounters for the requested time period, some results have not been displayed. A complete set of results can be found in Results Review.                Passed - In person appointment or virtual visit in the past 6 months     Recent Outpatient Visits              3 weeks ago Renal insufficiency    Henrry Gibbs MD    Office Visit    1 month ago Nail dystrophy    [address] [place] [address] Jesus Farr [place]    Office Visit    2 months ago Renal neoplasm    [facility], [address] [facility], Delonte Duque MD    Office Visit    4 months ago Renal insufficiency    Henrry Gibbs MD    Office Visit    5 months ago Seborrheic keratosis    [address],[place] [address], [facility] Callie Nogueira PA-C    Office Visit          Future Appointments         Provider Department Appt Notes    In 1 month Jesus Farr DPM [facility], [address], [address] [place]/pt offered 10/10 appt, preferred later appt    In 2 months Filipe Rodriguez MD [address],[facility], [facility] follow up 3 months

## 2023-11-02 ENCOUNTER — OFFICE VISIT (OUTPATIENT)
Dept: PODIATRY CLINIC | Facility: CLINIC | Age: 88
End: 2023-11-02
Payer: MEDICARE

## 2023-11-02 DIAGNOSIS — M79.671 PAIN IN BOTH FEET: Primary | ICD-10-CM

## 2023-11-02 DIAGNOSIS — B35.1 ONYCHOMYCOSIS: ICD-10-CM

## 2023-11-02 DIAGNOSIS — M79.672 PAIN IN BOTH FEET: Primary | ICD-10-CM

## 2023-11-02 DIAGNOSIS — L60.3 NAIL DYSTROPHY: ICD-10-CM

## 2023-11-02 PROCEDURE — 99213 OFFICE O/P EST LOW 20 MIN: CPT | Performed by: STUDENT IN AN ORGANIZED HEALTH CARE EDUCATION/TRAINING PROGRAM

## 2023-11-02 PROCEDURE — 1126F AMNT PAIN NOTED NONE PRSNT: CPT | Performed by: STUDENT IN AN ORGANIZED HEALTH CARE EDUCATION/TRAINING PROGRAM

## 2023-11-02 NOTE — PROGRESS NOTES
[facility], [facility]iatry  Progress Note      Ángel Laird is a 80year old male. Patient presents with:  Toenail Care: Nail trim and foot check f/u- rates pain 1-2/10 only when putting pressure on the toes            HPI:     Patient is patient is a pleasant 24-year-old male who presents to clinic today accompanied by his daughter for bilateral foot evaluation. Patient admits to pain to  his right hallux toenail. Allergies: Patient has no known allergies. Current Outpatient Medications   Medication Sig Dispense Refill    metoprolol succinate ER 50 MG Oral Tablet 24 Hr Take 1 tablet (50 mg total) by mouth daily. 90 tablet 3    lisinopril 10 MG Oral Tab Take 1 tablet (10 mg total) by mouth in the morning and 1 tablet (10 mg total) before bedtime. 180 tablet 1    amLODIPine 5 MG Oral Tab Take 1 tablet (5 mg total) by mouth daily. 90 tablet 0    amLODIPine 5 MG Oral Tab Take 1 tablet (5 mg total) by mouth daily. (Patient not taking: Reported on 9/5/2023) 90 tablet 0    cholecalciferol 50 MCG (2000 UT) Oral Tab Take 1 tablet (2,000 Units total) by mouth daily. 90 tablet 3    levothyroxine 50 MCG Oral Tab Take 1 tablet (50 mcg total) by mouth before breakfast. 90 tablet 3    divalproex  MG Oral Tablet 24 Hr Take 1 tablet (500 mg total) by mouth daily. 90 tablet 3    levetiracetam 750 MG Oral Tab Take 1 tablet (750 mg total) by mouth 2 (two) times daily. 180 tablet 3    Magnesium 100 MG Oral Tab Take by mouth. aspirin 81 MG Oral Tab EC Take 1 tablet (81 mg total) by mouth daily.         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 Jenny Husain of eyelid 12/4/2014    H/O prostate biopsy 2011    Hearing impairment     High cholesterol     History of vitrectomy 9/21/2016    Hyperlipidemia     Macular degeneration     Prostate cancer ([facility] [address]     Pseudophakia of both eyes 12/4/2014    Seizure disorder ([facility] [address] TIA (transient ischemic attack)     Unspecified essential hypertension     Vitreous floaters of right eye 2014      Past Surgical History:   Procedure Laterality Date    APPENDECTOMY      BEVACIZUMAB, 10MG Bilateral     Avastin injection 21 OU by Dr. Haseeb Gentile Right 14    RJM    CATARACT EXTRACTION W/  INTRAOCULAR LENS IMPLANT Left 5/3/10    RJM    CHOLECYSTECTOMY      COLONOSCOPY      Per NG: adenoma    KNEE REPLACEMENT SURGERY Left approx. 2006    OTHER SURGICAL HISTORY Right     Per NG: Post op CME -- IVKcryo kidney tumor    VITRECTOMY,MECHANICAL Left 9/1/10    for macular pucker with CME with Dr. Liliam Goldberg Right 16    Dr. Liliam Goldberg Left 1997     Lonell Crea with CME      Family History   Problem Relation Age of Onset    Diabetes Neg     Glaucoma Neg     Macular degeneration Neg       Social History    Socioeconomic History      Marital status:     Tobacco Use      Smoking status: Former        Types: Cigarettes        Quit date: 1960        Years since quittin.8      Smokeless tobacco: Never    Vaping Use      Vaping Use: Never used    Substance and Sexual Activity      Alcohol use: No        Alcohol/week: 0.0 standard drinks of alcohol      Drug use: No    Other Topics      Concerns:        Caffeine Concern: Yes          1-2 cups coffee per day        Exercise: Yes          Walks to the store, 1 mile, twice per week        Pt has a pacemaker: No        Pt has a defibrillator: No        Reaction to local anesthetic: No          REVIEW OF SYSTEMS:     Denies nause, fever, chills  No calf pain  Denies chest pain or SOB      EXAM:   There were no vitals taken for this visit. GENERAL: well developed, well nourished, in no apparent distress  EXTREMITIES:   1. Integument: Normal skin temperature and turgor.  Toenails x10 are elongated, thickened and discolored with subungal derbi. Incurvated right medial hallux nail plate with no acute signs of infeciton    2. Vascular: Dorsalis pedis two out of four bilateral and posterior tibial pulses two out of   four bilateral, capillary refill normal.   3. Musculoskeletal: All muscle groups are graded 5 out of 5 in the foot and ankle. 4. Neurological: Normal sharp dull sensation; reflexes normal.             ASSESSMENT AND PLAN:   Diagnoses and all orders for this visit:    Pain in both feet    Onychomycosis    Nail dystrophy        Plan:       -Patient examined, chart history reviewed. -Discussed importance of proper pedal hygiene, regular foot checks, and tight glucose control.  -Sharply debrided nails x10 with a sterile nail nipper achieving a 20% reduction in thickness and length, without incident. Slant back to right medial hallux nail fold providing relief. Mild bleeding noted, hemostasis achieved with silver nitrate and toe dressed with bandaid.   -Recommend foot soaks in 48 hours with warm water.   -Ambulate with supportive shoes and inserts and avoid walking barefoot.  -Educated patient on acute signs of infection advised patient to seek immediate medical attention if symptoms arise. RTC in 9 weeks      The patient indicates understanding of these issues and agrees to the plan.         Soledad Cormier DPM

## 2023-12-05 ENCOUNTER — LAB ENCOUNTER (OUTPATIENT)
Dept: LAB | Age: 88
End: 2023-12-05
Attending: FAMILY MEDICINE
Payer: MEDICARE

## 2023-12-05 ENCOUNTER — OFFICE VISIT (OUTPATIENT)
Dept: FAMILY MEDICINE CLINIC | Facility: CLINIC | Age: 88
End: 2023-12-05

## 2023-12-05 VITALS
SYSTOLIC BLOOD PRESSURE: 156 MMHG | OXYGEN SATURATION: 98 % | HEIGHT: 69 IN | BODY MASS INDEX: 33.89 KG/M2 | WEIGHT: 228.81 LBS | HEART RATE: 46 BPM | DIASTOLIC BLOOD PRESSURE: 75 MMHG | RESPIRATION RATE: 14 BRPM

## 2023-12-05 DIAGNOSIS — N28.9 RENAL INSUFFICIENCY: ICD-10-CM

## 2023-12-05 DIAGNOSIS — I10 ESSENTIAL HYPERTENSION: Primary | ICD-10-CM

## 2023-12-05 LAB
ANION GAP SERPL CALC-SCNC: 4 MMOL/L (ref 0–18)
BUN BLD-MCNC: 22 MG/DL (ref 9–23)
BUN/CREAT SERPL: 16.3 (ref 10–20)
CALCIUM BLD-MCNC: 9.4 MG/DL (ref 8.7–10.4)
CHLORIDE SERPL-SCNC: 108 MMOL/L (ref 98–112)
CO2 SERPL-SCNC: 30 MMOL/L (ref 21–32)
CREAT BLD-MCNC: 1.35 MG/DL
EGFRCR SERPLBLD CKD-EPI 2021: 49 ML/MIN/1.73M2 (ref 60–?)
FASTING STATUS PATIENT QL REPORTED: NO
GLUCOSE BLD-MCNC: 85 MG/DL (ref 70–99)
OSMOLALITY SERPL CALC.SUM OF ELEC: 297 MOSM/KG (ref 275–295)
POTASSIUM SERPL-SCNC: 4.2 MMOL/L (ref 3.5–5.1)
SODIUM SERPL-SCNC: 142 MMOL/L (ref 136–145)

## 2023-12-05 PROCEDURE — 36415 COLL VENOUS BLD VENIPUNCTURE: CPT

## 2023-12-05 PROCEDURE — 80048 BASIC METABOLIC PNL TOTAL CA: CPT

## 2023-12-05 PROCEDURE — 1126F AMNT PAIN NOTED NONE PRSNT: CPT | Performed by: FAMILY MEDICINE

## 2023-12-05 PROCEDURE — 90662 IIV NO PRSV INCREASED AG IM: CPT | Performed by: FAMILY MEDICINE

## 2023-12-05 PROCEDURE — 99213 OFFICE O/P EST LOW 20 MIN: CPT | Performed by: FAMILY MEDICINE

## 2023-12-05 PROCEDURE — G0008 ADMIN INFLUENZA VIRUS VAC: HCPCS | Performed by: FAMILY MEDICINE

## 2023-12-05 NOTE — PROGRESS NOTES
Subjective:   Patient ID: Sanjeev Hall is a 80year old male. Patient for f/u hypertension   Denies any chest pain shortness of breath or headaches. Monitoring blood pressure at home and is below 135/85. Needs refill of medications. History/Other:   Review of Systems   Constitutional: Negative. Respiratory: Negative. Cardiovascular: Negative. Current Outpatient Medications   Medication Sig Dispense Refill    metoprolol succinate ER 50 MG Oral Tablet 24 Hr Take 1 tablet (50 mg total) by mouth daily. 90 tablet 3    lisinopril 10 MG Oral Tab Take 1 tablet (10 mg total) by mouth in the morning and 1 tablet (10 mg total) before bedtime. 180 tablet 1    amLODIPine 5 MG Oral Tab Take 1 tablet (5 mg total) by mouth daily. 90 tablet 0    amLODIPine 5 MG Oral Tab Take 1 tablet (5 mg total) by mouth daily. 90 tablet 0    cholecalciferol 50 MCG (2000 UT) Oral Tab Take 1 tablet (2,000 Units total) by mouth daily. 90 tablet 3    levothyroxine 50 MCG Oral Tab Take 1 tablet (50 mcg total) by mouth before breakfast. 90 tablet 3    divalproex  MG Oral Tablet 24 Hr Take 1 tablet (500 mg total) by mouth daily. 90 tablet 3    levetiracetam 750 MG Oral Tab Take 1 tablet (750 mg total) by mouth 2 (two) times daily. 180 tablet 3    Magnesium 100 MG Oral Tab Take by mouth. aspirin 81 MG Oral Tab EC Take 1 tablet (81 mg total) by mouth daily. Allergies:No Known Allergies    Objective:   Physical Exam  Constitutional:       Appearance: He is well-developed. Cardiovascular:      Rate and Rhythm: Normal rate and regular rhythm. Heart sounds: Normal heart sounds. Pulmonary:      Effort: Pulmonary effort is normal.      Breath sounds: Normal breath sounds. Neurological:      Mental Status: He is alert. Deep Tendon Reflexes: Reflexes are normal and symmetric. Assessment & Plan:     ICD-10-CM    1.  Essential hypertension  I10         Cpm     Orders Placed This Encounter Procedures    High Dose Fluzone 65 yr and older PFS [07950]       Meds This Visit:  Requested Prescriptions      No prescriptions requested or ordered in this encounter       Imaging & Referrals:  FLU VACC HIGH DOSE PRSV FREE

## 2023-12-27 RX ORDER — LEVOTHYROXINE SODIUM 0.05 MG/1
50 TABLET ORAL
Qty: 90 TABLET | Refills: 3 | Status: SHIPPED | OUTPATIENT
Start: 2023-12-27

## 2023-12-27 NOTE — TELEPHONE ENCOUNTER
Please review; protocol failed/ has no protocol    Requested Prescriptions   Pending Prescriptions Disp Refills    amLODIPine 5 MG Oral Tab [Pharmacy Med Name: Amlodipine Besylate 5 Mg Tab Unic] 90 tablet 0     Sig: Take 1 tablet (5 mg total) by mouth daily.        Hypertensive Medications Protocol Failed - 12/25/2023  3:49 PM        Failed - Last BP reading less than 140/90     BP Readings from Last 1 Encounters:   12/05/23 156/75               Failed - EGFRCR or GFRNAA > 50     GFR Evaluation  EGFRCR: 49 , resulted on 12/5/2023          Passed - In person appointment in the past 12 or next 3 months     Recent Outpatient Visits              3 weeks ago Essential hypertension    Hui Hendricks MD    Office Visit    1 month ago Pain in both feet    Chavo Kim [place], [place]    Office Visit    3 months ago Renal insufficiency    Brenda Brower MD    Office Visit    4 months ago Nail dystrophy    Laina Thomas AlexanderGabi Padgett    Office Visit    5 months ago Renal neoplasm    Alondra Antonio, [address], Viji Chaudhry MD    Office Visit          Future Appointments         Provider Department Appt Notes    In 1 week Dinh Hernandez DPM [facility], [address], [address] or BMP in past 6 months     Recent Results (from the past 4392 hour(s))   Basic Metabolic Panel (8) [E]    Collection Time: 12/05/23 12:14 PM   Result Value Ref Range    Glucose 85 70 - 99 mg/dL    Sodium 142 136 - 145 mmol/L    Potassium 4.2 3.5 - 5.1 mmol/L    Chloride 108 98 - 112 mmol/L    CO2 30.0 21.0 - 32.0 mmol/L    Anion Gap 4 0 - 18 mmol/L    BUN 22 9 - 23 mg/dL    Creatinine 1.35 (H) 0.70 - 1.30 mg/dL    BUN/CREA Ratio 16.3 10.0 - 20.0    Calcium, Total 9.4 8.7 - 10.4 mg/dL    Calculated Osmolality 297 (H) 275 - 295 mOsm/kg    eGFR-Cr 49 (L) >=60 mL/min/1.73m2    Patient Fasting for BMP? No      *Note: Due to a large number of results and/or encounters for the requested time period, some results have not been displayed. A complete set of results can be found in Results Review.                Passed - In person appointment or virtual visit in the past 6 months     Recent Outpatient Visits              3 weeks ago Essential hypertension    [address], [address] [facility]    Office Visit    1 month ago Pain in both [address], [place] [facility]    Office Visit    3 months ago Renal insufficiency    Hoda Roberto MD    Office Visit    4 months ago Nail dystrophy    [facility]an Velez[facility]    Office Visit    5 months ago Renal neoplasm    [address], [address], Noy Traylor MD    Office Visit          Future Appointments         Provider Department Appt Notes    In 1 week Krunal Mazariegos DPM [facility], [address], [place] 9 [place] FOLLOW UP                Signed Prescriptions Disp Refills    levothyroxine 50 MCG Oral Tab 90 tablet 3     Sig: Take 1 tablet (50 mcg total) by mouth before breakfast.       Thyroid Medication Protocol Passed - 12/25/2023  3:49 PM        Passed - TSH in past 12 months        Passed - Last TSH value is normal     Lab Results   Component Value Date    TSH 2.000 02/08/2023                 Passed - In person appointment or virtual visit in the past 12 mos or appointment in next 3 mos     Recent Outpatient Visits              3 weeks ago Essential hypertension    Hoda Roberto MD    Office Visit 1 month ago Pain in both feet    [facility], [address], [place] Wanda Wells [place]    Office Visit    3 months ago Renal insufficiency    Curt Laguna MD    Office Visit    4 months ago Nail dystrophy    Susan OrangeWenceslao [place]    Office Visit    5 months ago Renal neoplasm    Susan Briseno, Eulalio Olvera MD    Office Visit          Future Appointments         Provider Department Appt Notes    In 1 week Wanda Wells DPM [facility], [address], [place] 9 [facility]                  Recent Outpatient Visits              3 weeks ago Essential hypertension    Curt Laguna MD    Office Visit    1 month ago Pain in both feet    Wenceslao [place]    Office Visit    3 months ago Renal insufficiency    Curt Laguna MD    Office Visit    4 months ago Nail dystrophy    Susan Orange [place]    Office Visit    5 months ago Renal neoplasm    [address] [place],[address], [address], Eulalio Olvera MD    Office Visit          Future Appointments         Provider Department Appt Notes    In 1 week Wanda Wells DPM [facility], [address], [place] 9 Moisésa Youssef Renzo [address]

## 2023-12-27 NOTE — TELEPHONE ENCOUNTER
Refill passed per [place] [facility] [place], [facility] protocol. Requested Prescriptions   Pending Prescriptions Disp Refills    LEVOTHYROXINE 50 MCG Oral Tab [Pharmacy Med Name: Levothyroxine Sodium 50 Mcg Tab Lupi] 90 tablet 0     Sig: Take 1 tablet (50 mcg total) by mouth before breakfast.       Thyroid Medication Protocol Passed - 12/25/2023  3:49 PM        Passed - TSH in past 12 months        Passed - Last TSH value is normal     Lab Results   Component Value Date    TSH 2.000 02/08/2023                 Passed - In person appointment or virtual visit in the past 12 mos or appointment in next 3 mos     Recent Outpatient Visits              3 weeks ago Essential hypertension    Miguel Merrill MD    Office Visit    1 month ago Pain in both feet    [address] [facility], [place], [place]    Office Visit    3 months ago Renal insufficiency    Deon Limon MD    Office Visit    4 months ago Nail dystrophy    [address], [address] [facility],[address], [place], [place]    Office Visit    5 months ago Renal neoplasm    [address], [address], Roxanna Keyes MD    Office Visit          Future Appointments         Provider Department Appt Notes    In 1 week Juventinonita Malik DPM [facility], [address] [facility],[address], [place] 9 [address] [facility]                 AMLODIPINE 5 MG Oral Tab [Pharmacy Med Name: Amlodipine Besylate 5 Mg Tab Unic] 90 tablet 0     Sig: Take 1 tablet by mouth daily.        Hypertensive Medications Protocol Failed - 12/25/2023  3:49 PM        Failed - Last BP reading less than 140/90     BP Readings from Last 1 Encounters:   12/05/23 156/75               Failed - EGFRCR or GFRNAA > 50     GFR Evaluation  EGFRCR: 49 , resulted on 12/5/2023          Passed - In person appointment in the past 12 or next 3 months     Recent Outpatient Visits              3 weeks ago Essential hypertension    [facility], [address] [place]    Office Visit    1 month ago Pain in both feet    [address] [facility],  Shari Y[place], [place]    Office Visit    3 months ago Renal insufficiency    Gilbert Nuñez, [place] Shahrzad Blackburn MD    Office Visit    4 months ago Nail dystrophy    [address] [facility], [place] Feli Torres [place]    Office Visit    5 months ago Renal neoplasm    [address], [address], Getachew Lobo MD    Office Visit          Future Appointments         Provider Department Appt Notes    In 1 week Feli Torres [facility], [address], [place] [address] [place] [address] Dr or BMP in past 6 months     Recent Results (from the past 4392 hour(s))   Basic Metabolic Panel (8) [E]    Collection Time: 12/05/23 12:14 PM   Result Value Ref Range    Glucose 85 70 - 99 mg/dL    Sodium 142 136 - 145 mmol/L    Potassium 4.2 3.5 - 5.1 mmol/L    Chloride 108 98 - 112 mmol/L    CO2 30.0 21.0 - 32.0 mmol/L    Anion Gap 4 0 - 18 mmol/L    BUN 22 9 - 23 mg/dL    Creatinine 1.35 (H) 0.70 - 1.30 mg/dL    BUN/CREA Ratio 16.3 10.0 - 20.0    Calcium, Total 9.4 8.7 - 10.4 mg/dL    Calculated Osmolality 297 (H) 275 - 295 mOsm/kg    eGFR-Cr 49 (L) >=60 mL/min/1.73m2    Patient Fasting for BMP? No      *Note: Due to a large number of results and/or encounters for the requested time period, some results have not been displayed. A complete set of results can be found in Results Review.                Passed - In person appointment or virtual visit in the past 6 months     Recent Outpatient Visits              3 weeks ago Essential hypertension    Yolanda Phan MD    Office Visit    1 month ago Pain in both feet    [facility], [address], [place], [place]    Office Visit    3 months ago Renal insufficiency    Josefina Romero MD    Office Visit    4 months ago Nail dystrophy    [address] [facility], [place], [place]    Office Visit    5 months ago Renal neoplasm    [address] [facility], [place] Corado MD    Office Visit          Future Appointments         Provider Department Appt Notes    In 1 week Chi Bell DPM [facility], [address], [facility] 9 [address] [facility]                  Recent Outpatient Visits              3 weeks ago Essential hypertension    Josefina Romero MD    Office Visit    1 month ago Pain in both feet    [address] [facility], [place], [place]    Office Visit    3 months ago Renal insufficiency    Josefina Romero MD    Office Visit    4 months ago Nail dystrophy    [address] [facility], [place], [place]    Office Visit    5 months ago Renal neoplasm    [address] [facility],[address], [address], Trin[place] Corado MD    Office Visit          Future Appointments         Provider Department Appt Notes    In 1 week Chi Bell DPM [facility], [address], [place] 9 [facility] Youssef [address]

## 2023-12-28 RX ORDER — AMLODIPINE BESYLATE 5 MG/1
5 TABLET ORAL DAILY
Qty: 90 TABLET | Refills: 0 | Status: SHIPPED | OUTPATIENT
Start: 2023-12-28

## 2024-01-04 ENCOUNTER — OFFICE VISIT (OUTPATIENT)
Dept: PODIATRY CLINIC | Facility: CLINIC | Age: 89
End: 2024-01-04

## 2024-01-04 DIAGNOSIS — M79.671 PAIN IN BOTH FEET: Primary | ICD-10-CM

## 2024-01-04 DIAGNOSIS — M79.672 PAIN IN BOTH FEET: Primary | ICD-10-CM

## 2024-01-04 DIAGNOSIS — B35.1 ONYCHOMYCOSIS: ICD-10-CM

## 2024-01-04 PROCEDURE — 99213 OFFICE O/P EST LOW 20 MIN: CPT | Performed by: STUDENT IN AN ORGANIZED HEALTH CARE EDUCATION/TRAINING PROGRAM

## 2024-01-04 NOTE — PROGRESS NOTES
[facility]  Progress Note      Casa Lozano is a 89 year old male.   Chief Complaint   Patient presents with    Toenail Care     9wk toenail care- no pain or concerns - here with his daughter              HPI:     Patient presents to clinic today for toenail care to bilateral feet.  He is accompanied by his daughter.    Allergies: Patient has no known allergies.    Current Outpatient Medications   Medication Sig Dispense Refill    amLODIPine 5 MG Oral Tab Take 1 tablet (5 mg total) by mouth daily. 90 tablet 0    levothyroxine 50 MCG Oral Tab Take 1 tablet (50 mcg total) by mouth before breakfast. 90 tablet 3    metoprolol succinate ER 50 MG Oral Tablet 24 Hr Take 1 tablet (50 mg total) by mouth daily. 90 tablet 3    lisinopril 10 MG Oral Tab Take 1 tablet (10 mg total) by mouth in the morning and 1 tablet (10 mg total) before bedtime. 180 tablet 1    amLODIPine 5 MG Oral Tab Take 1 tablet (5 mg total) by mouth daily. 90 tablet 0    cholecalciferol 50 MCG (2000 UT) Oral Tab Take 1 tablet (2,000 Units total) by mouth daily. 90 tablet 3    divalproex  MG Oral Tablet 24 Hr Take 1 tablet (500 mg total) by mouth daily. 90 tablet 3    levetiracetam 750 MG Oral Tab Take 1 tablet (750 mg total) by mouth 2 (two) times daily. 180 tablet 3    Magnesium 100 MG Oral Tab Take by mouth.      aspirin 81 MG Oral Tab EC Take 1 tablet (81 mg total) by mouth daily.        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 Nava    Dermatochalasis of eyelid 12/4/2014    H/O prostate biopsy 2011    Hearing impairment     High cholesterol     History of vitrectomy 9/21/2016    Hyperlipidemia     Macular degeneration     Prostate cancer (HCC)     Pseudophakia of both eyes 12/4/2014    Seizure disorder (HCC)     TIA (transient ischemic attack)     Unspecified essential hypertension     Vitreous floaters of right eye 12/4/2014       Past Surgical History:   Procedure Laterality Date    APPENDECTOMY      BEVACIZUMAB, 10MG Bilateral     Avastin injection 21 OU by Dr. Magdaleno     CATARACT EXTRACTION W/  INTRAOCULAR LENS IMPLANT Right 14    RJM    CATARACT EXTRACTION W/  INTRAOCULAR LENS IMPLANT Left 5/3/10    RJM    CHOLECYSTECTOMY      COLONOSCOPY      Per NG: adenoma    KNEE REPLACEMENT SURGERY Left approx.     OTHER SURGICAL HISTORY Right     Per NG: Post op CME -- IVKcryo kidney tumor    VITRECTOMY,MECHANICAL Left 9/1/10    for macular pucker with CME with Dr. Magdaleno    VITRECTOMY,MECHANICAL Right 16    Dr. Magdaleno    VITRECTOMY,MECHANICAL Left 1997     Macular Puckering with CME      Family History   Problem Relation Age of Onset    Diabetes Neg     Glaucoma Neg     Macular degeneration Neg       Social History     Socioeconomic History    Marital status:    Tobacco Use    Smoking status: Former     Types: Cigarettes     Quit date: 1960     Years since quittin.9    Smokeless tobacco: Never   Vaping Use    Vaping Use: Never used   Substance and Sexual Activity    Alcohol use: No     Alcohol/week: 0.0 standard drinks of alcohol    Drug use: No   Other Topics Concern    Caffeine Concern Yes     Comment: 1-2 cups coffee per day    Exercise Yes     Comment: Walks to the store, 1 mile, twice per week    Pt has a pacemaker No    Pt has a defibrillator No    Reaction to local anesthetic No           REVIEW OF SYSTEMS:     Denies nause, fever, chills  No calf pain  Denies chest pain or SOB      EXAM:   There were no vitals taken for this visit.  GENERAL: well developed, well nourished, in no apparent distress  EXTREMITIES:   1. Integument: Normal skin temperature and turgor. Toenails x10 are elongated, thickened and discolored with subungal derbi.     2. Vascular: Dorsalis pedis two out of four bilateral and posterior tibial pulses two out of   four bilateral, capillary refill normal.   3.  Musculoskeletal: All muscle groups are graded 5 out of 5 in the foot and ankle.   4. Neurological: Normal sharp dull sensation; reflexes normal.             ASSESSMENT AND PLAN:   Diagnoses and all orders for this visit:    Pain in both feet    Onychomycosis        Plan:       -Patient examined, chart history reviewed.  -Discussed importance of proper pedal hygiene and regular foot checks  -Sharply debrided nails x10 with a sterile nail nipper achieving a 20% reduction in thickness and length, without incident.   -Use vicks vapor rub to toenails once daily.  -Ambulate with supportive shoes and inserts and avoid walking barefoot.  -Educated patient on acute signs of infection advised patient to seek immediate medical attention if symptoms arise.    RTC in 3 months       The patient indicates understanding of these issues and agrees to the plan.        Alicia Chin DPM

## 2024-02-06 ENCOUNTER — TELEPHONE (OUTPATIENT)
Dept: FAMILY MEDICINE CLINIC | Facility: CLINIC | Age: 89
End: 2024-02-06

## 2024-02-06 NOTE — TELEPHONE ENCOUNTER
Spoke with patients daughter who says patient will make his appointment for his medicare physical online

## 2024-02-23 ENCOUNTER — HOSPITAL ENCOUNTER (EMERGENCY)
Facility: HOSPITAL | Age: 89
Discharge: HOME | End: 2024-02-23
Attending: EMERGENCY MEDICINE
Payer: MEDICARE

## 2024-02-23 VITALS
OXYGEN SATURATION: 96 % | RESPIRATION RATE: 20 BRPM | HEART RATE: 65 BPM | BODY MASS INDEX: 32 KG/M2 | WEIGHT: 220 LBS | TEMPERATURE: 97 F | SYSTOLIC BLOOD PRESSURE: 164 MMHG | DIASTOLIC BLOOD PRESSURE: 89 MMHG

## 2024-02-23 DIAGNOSIS — S81.812A LACERATION OF LEFT LOWER EXTREMITY, INITIAL ENCOUNTER: Primary | ICD-10-CM

## 2024-02-23 PROCEDURE — 90471 IMMUNIZATION ADMIN: CPT

## 2024-02-23 PROCEDURE — 12002 RPR S/N/AX/GEN/TRNK2.6-7.5CM: CPT

## 2024-02-23 PROCEDURE — 99283 EMERGENCY DEPT VISIT LOW MDM: CPT

## 2024-02-23 RX ORDER — LIDOCAINE HCL/EPINEPHRINE/PF 2%-1:200K
20 VIAL (ML) INJECTION ONCE
Status: COMPLETED | OUTPATIENT
Start: 2024-02-23 | End: 2024-02-23

## 2024-02-23 NOTE — ED PROVIDER NOTES
Patient Seen in: [facility] Emergency Department      History     Chief Complaint   Patient presents with    Laceration/Abrasion     Stated Complaint: Skin tear to L lower leg.    Subjective:   HPI    Patient presents the emergency department after sustaining a laceration to his left lower leg from a plastic box.  He opened up a morocho back of a truck and a plastic box filled with papers fell and struck his lower leg sustaining a laceration.  There was a significant amount of bleeding according to the daughter but this seems to have resolved on its own.  He takes aspirin daily.    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 Nava    Dermatochalasis of eyelid 12/4/2014    H/O prostate biopsy 2011    Hearing impairment     High cholesterol     History of vitrectomy 9/21/2016    Hyperlipidemia     Macular degeneration     Prostate cancer (HCC)     Pseudophakia of both eyes 12/4/2014    Seizure disorder (HCC)     TIA (transient ischemic attack)     Unspecified essential hypertension     Vitreous floaters of right eye 12/4/2014              Past Surgical History:   Procedure Laterality Date    APPENDECTOMY      BEVACIZUMAB, 10MG Bilateral 2021    Avastin injection 9/16/21 OU by Dr. Magdaleno     CATARACT EXTRACTION W/  INTRAOCULAR LENS IMPLANT Right 2/17/14    RJM    CATARACT EXTRACTION W/  INTRAOCULAR LENS IMPLANT Left 5/3/10    RJM    CHOLECYSTECTOMY      COLONOSCOPY      Per NG: adenoma    KNEE REPLACEMENT SURGERY Left approx. 2006    OTHER SURGICAL HISTORY Right 2014    Per NG: Post op CME -- IVKcryo kidney tumor    VITRECTOMY,MECHANICAL Left 9/1/10    for macular pucker with CME with Dr. Sharla COX,MECHANICAL Right 5/18/16    Dr. Sharla COX,MECHANICAL Left 1997 / 2010     Macular Puckering with CME                Social History     Socioeconomic History    Marital status:     Tobacco Use    Smoking status: Former     Types: Cigarettes     Quit date: 1960     Years since quittin.1    Smokeless tobacco: Never   Vaping Use    Vaping Use: Never used   Substance and Sexual Activity    Alcohol use: No     Alcohol/week: 0.0 standard drinks of alcohol    Drug use: No   Other Topics Concern    Caffeine Concern Yes     Comment: 1-2 cups coffee per day    Exercise Yes     Comment: Walks to the store, 1 mile, twice per week    Pt has a pacemaker No    Pt has a defibrillator No    Reaction to local anesthetic No   Social History Narrative    The patient does not use an assistive device..      The patient does live in a home with stairs.              Review of Systems    Positive for stated complaint: Skin tear to L lower leg.  Other systems are as noted in HPI.  Constitutional and vital signs reviewed.      All other systems reviewed and negative except as noted above.    Physical Exam     ED Triage Vitals [24 1332]   BP (!) 164/89   Pulse 65   Resp 20   Temp 97.3 °F (36.3 °C)   Temp src Temporal   SpO2 96 %   O2 Device None (Room air)       Current:BP (!) 164/89   Pulse 65   Temp 97.3 °F (36.3 °C) (Temporal)   Resp 20   Wt 99.8 kg   SpO2 96%   BMI 32.49 kg/m²         Physical Exam  Vitals and nursing note reviewed.   Constitutional:       General: He is not in acute distress.     Appearance: He is well-developed.   Pulmonary:      Effort: No respiratory distress.   Musculoskeletal:      Comments: Left lower extremity was examined.  There are strong pulses in the foot and ankle with normal sensation light touch and cap refill brisk and less than 2 seconds.  There is a V-shaped avulsion laceration with the tip of the V shaped tissue pointing towards the patient's head.  There is no active bleeding.               ED Course   Labs Reviewed - No data to display                   MDM                   Medical Decision Making  Problems Addressed:  Laceration of left lower  extremity, initial encounter: acute illness or injury    Amount and/or Complexity of Data Reviewed  Discussion of management or test interpretation with external provider(s): Boostrix was given in the emergency department as patient's last tetanus shot was 20 years ago.    Laceration Repair    Injury:  Body area:  left lower leg  Laceration length (cm):  6cm  Foreign bodies:  None  Tendon involvement:  None  Nerve involvement:  None  Vascular damage?  None    Anesthesia:   Local anesthetic:  Lidocaine 1%c epi    Procedure:  Patient was prepped and draped in usual sterile fashion.    The wound was irrigated copiously with normal saline prior to closure.  Skin closure:  4-0 nylon #6    Patient tolerated the procedure well with no immediate complications.          Disposition and Plan     Clinical Impression:  1. Laceration of left lower extremity, initial encounter         Disposition:  There is no disposition on file for this visit.  There is no disposition time on file for this visit.    Follow-up:  Khushboo Anne MD  [address] [place] [id number]  [phone]    Schedule an appointment as soon as possible for a visit  or return here for sutre removal, For suture removal    We recommend that you schedule follow up care with a primary care provider within the next three months to obtain basic health screening including reassessment of your blood pressure.      Medications Prescribed:  Current Discharge Medication List

## 2024-02-23 NOTE — ED INITIAL ASSESSMENT (HPI)
Patient arrives ambulatory through triage c/o skin tear to the left lower leg that occurred about 1 hour ago while lifting a box. On ASA.  Unsure of last Tdap shot.

## 2024-02-28 ENCOUNTER — TELEPHONE (OUTPATIENT)
Dept: FAMILY MEDICINE CLINIC | Facility: CLINIC | Age: 89
End: 2024-02-28

## 2024-02-28 NOTE — TELEPHONE ENCOUNTER
Patient recently received stitches in ER, wants to know if Dr Anne can removed them at his 3/5 MA Supervisit.    Policy advised, daughter doesn't want to expose patient to all the germs and illnesses that the ER has.       Please call daughter Vashti to let her know. May leave voice mail if she doesn't answer.

## 2024-03-05 ENCOUNTER — OFFICE VISIT (OUTPATIENT)
Dept: FAMILY MEDICINE CLINIC | Facility: CLINIC | Age: 89
End: 2024-03-05
Payer: MEDICARE

## 2024-03-05 VITALS
HEART RATE: 57 BPM | BODY MASS INDEX: 33.38 KG/M2 | HEIGHT: 69 IN | OXYGEN SATURATION: 96 % | RESPIRATION RATE: 14 BRPM | DIASTOLIC BLOOD PRESSURE: 66 MMHG | WEIGHT: 225.38 LBS | SYSTOLIC BLOOD PRESSURE: 124 MMHG

## 2024-03-05 DIAGNOSIS — H35.30 ARMD (AGE-RELATED MACULAR DEGENERATION), BILATERAL: ICD-10-CM

## 2024-03-05 DIAGNOSIS — C61 PROSTATE CANCER (HCC): ICD-10-CM

## 2024-03-05 DIAGNOSIS — Z00.00 ENCOUNTER FOR ANNUAL HEALTH EXAMINATION: ICD-10-CM

## 2024-03-05 DIAGNOSIS — C64.9 RENAL CELL CARCINOMA, UNSPECIFIED LATERALITY (HCC): ICD-10-CM

## 2024-03-05 DIAGNOSIS — R56.9 SEIZURE (HCC): ICD-10-CM

## 2024-03-05 DIAGNOSIS — E03.9 HYPOTHYROIDISM, UNSPECIFIED TYPE: ICD-10-CM

## 2024-03-05 DIAGNOSIS — T81.30XA WOUND DEHISCENCE: ICD-10-CM

## 2024-03-05 DIAGNOSIS — I10 PRIMARY HYPERTENSION: ICD-10-CM

## 2024-03-05 DIAGNOSIS — H90.3 BILATERAL SENSORINEURAL HEARING LOSS: ICD-10-CM

## 2024-03-05 DIAGNOSIS — N18.32 STAGE 3B CHRONIC KIDNEY DISEASE (HCC): Primary | ICD-10-CM

## 2024-03-05 RX ORDER — LISINOPRIL 10 MG/1
10 TABLET ORAL 2 TIMES DAILY
Qty: 180 TABLET | Refills: 3 | Status: SHIPPED | OUTPATIENT
Start: 2024-03-05

## 2024-03-05 NOTE — TELEPHONE ENCOUNTER
Please review; protocol failed/no protocol.     Requested Prescriptions   Pending Prescriptions Disp Refills    LISINOPRIL 10 MG Oral Tab [Pharmacy Med Name: Lisinopril 10 Mg Tab Lupi] 180 tablet 0     Sig: Take 1 tablet by mouth in the morning and 1 tablet before bedtime.       Hypertension Medications Protocol Failed - 3/2/2024  3:36 AM        Failed - Last BP reading less than 140/90     BP Readings from Last 1 Encounters:   02/23/24 (!) 164/89               Failed - EGFRCR or GFRNAA > 50     GFR Evaluation  EGFRCR: 49 , resulted on 12/5/2023          Passed - CMP or BMP in past 12 months        Passed - In person appointment or virtual visit in the past 12 mos or appointment in next 3 mos     Recent Outpatient Visits              [facility]    [facility]    3 months ago Essential hypertension    [facility] Khushboo Anne MD    Office Visit    4 months ago [facility] in both feet    [facility]    [facility] Visit    6 months ago [facility]    [facility]    6 months ago Nail dystrophy    [facility] Alicia Chin DPM    Office Visit          Future Appointments         Provider Department Appt Notes    Tomorrow Khushboo Anne MD [facility] Blood pressure. Requesting stitch removal as well    In 1 week Carroll Whipple MD [facility] Follow up.  Update medication.    In 1 month Alicia Chin DPM [facility] 3 MOS NAIL CARE                     Recent Outpatient Visits              2 months ago Pain in both feet    [place]  [facility] Alicia Chin DPM    Office Visit    3 months ago Essential hypertension    [facility] Khushboo Anne MD    Office Visit    4 months ago [facility] both [facility]    [facility]    6 months ago Renal insufficiency    [facility] Khushboo Anne MD    Office Visit    6 months ago [facility]    [facility]             Future Appointments         Provider Department Appt Notes    Tomorrow Khushboo Anne MD [facility] Blood pressure. Requesting stitch removal as well    In 1 week Carroll Whipple MD [facility] [place] Follow up.  Update medication.    In 1 month Alicia Chin DPM [facility] [place] 3 MOS NAIL CARE

## 2024-03-06 NOTE — PROGRESS NOTES
Subjective:   Casa Lozano is a 89 year old male who presents for a Medicare Subsequent Annual Wellness visit (Pt already had Initial Annual Wellness) and scheduled follow up of multiple significant but stable problems.       History/Other:   Fall Risk Assessment:   He has been screened for Falls and is low risk.      Cognitive Assessment:   He had a completely normal cognitive assessment - see flowsheet entries     Functional Ability/Status:   Casa Lozano has some abnormal functions as listed below:  He has Driving difficulties based on screening of functional status. He has Hearing problems based on screening of functional status.He has Vision problems based on screening of functional status.       Depression Screening (PHQ-2/PHQ-9):    negatve  5 minutes spent screening and counseling for depression    Advanced Directives:   He does have a Living Will but we do NOT have it on file in Epic.    He has a Power of  for Health Care on file in [place].  Discussed Advance Care Planning with patient (and family/surrogate if present). Standard forms made available to patient in After Visit Summary.      Patient Active Problem List   Diagnosis    Pseudophakia of both eyes    RPE mottling of macula left eye    Dermatochalasis of eyelid    Vitreous floaters of right eye    Seizure (HCC)    Prostate cancer (HCC)    Hypertension    History of vitrectomy    Bilateral sensorineural hearing loss    ARMD (age-related macular degeneration), bilateral    Renal cell carcinoma, unspecified laterality (HCC)    Stage 3b chronic kidney disease (HCC)     Allergies:  He has No Known Allergies.    Current Medications:  Outpatient Medications Marked as Taking for the 3/5/24 encounter (Office Visit) with Khushboo Anne MD   Medication Sig    amLODIPine 5 MG Oral Tab Take 1 tablet (5 mg total) by mouth daily.    levothyroxine 50 MCG Oral Tab Take 1 tablet (50 mcg total) by mouth before breakfast.    metoprolol succinate ER 50 MG  Oral Tablet 24 Hr Take 1 tablet (50 mg total) by mouth daily.    [DISCONTINUED] lisinopril 10 MG Oral Tab Take 1 tablet (10 mg total) by mouth in the morning and 1 tablet (10 mg total) before bedtime.    amLODIPine 5 MG Oral Tab Take 1 tablet (5 mg total) by mouth daily.    cholecalciferol 50 MCG (2000 UT) Oral Tab Take 1 tablet (2,000 Units total) by mouth daily.    divalproex  MG Oral Tablet 24 Hr Take 1 tablet (500 mg total) by mouth daily.    levetiracetam 750 MG Oral Tab Take 1 tablet (750 mg total) by mouth 2 (two) times daily.    Magnesium 100 MG Oral Tab Take by mouth.    aspirin 81 MG Oral Tab EC Take 1 tablet (81 mg total) by mouth daily.       Medical History:  He  has a past medical history of Arthritis, Cancer (HCC), CME (cystoid macular edema) (04/18/2014), Dermatochalasis of eyelid (12/04/2014), Dermatochalasis of eyelid (12/04/2014), H/O prostate biopsy (2011), Hearing impairment, High cholesterol, History of vitrectomy (09/21/2016), History of vitrectomy (09/21/2016), Hyperlipidemia, Macular degeneration, Prostate cancer ([facility]), Pseudophakia of both eyes (12/04/2014), Pseudophakia of both eyes (12/04/2014), RPE mottling of macula left eye (12/04/2014), Seizure disorder ([facility]), TIA (transient ischemic attack), Unspecified essential hypertension, Vitreous floaters of right eye (12/04/2014), and Vitreous floaters of right eye (12/04/2014).  Surgical History:  He  has a past surgical history that includes appendectomy; cholecystectomy; colonoscopy; Cataract extraction w/  intraocular lens implant (Right, 2/17/14); Cataract extraction w/  intraocular lens implant (Left, 5/3/10); vitrectomy,mechanical (Left, 9/1/10); knee replacement surgery (Left, approx. 2006); vitrectomy,mechanical (Right, 5/18/16); bevacizumab, 10mg (Bilateral, 2021); vitrectomy,mechanical (Left, 1997 / 2010); and other surgical history (Right, 2014).   Family History:  His family history is not on file.  Social History:  He   reports that he quit smoking about 64 years ago. His smoking use included cigarettes. He has never used smokeless tobacco. He reports that he does not drink alcohol and does not use drugs.    Tobacco:  He smoked tobacco in the past but quit greater than 12 months ago.  Social History    Tobacco Use      Smoking status: Former        Types: Cigarettes        Quit date: 1960        Years since quittin.1      Smokeless tobacco: Never       CAGE Alcohol Screen:   CAGE screening score of 0 on 3/2/2024, showing low risk of alcohol abuse.      Patient Care Team:  Khushboo Anne MD as PCP - General (Family Practice)  Casas, Сергей Zuniga MD (Radiation Oncology)  Romero, Griselle, RN [place]ville, Mary, RN Lussky, Donald, MD (NEUROLOGY)    Review of Systems  GENERAL: feels well otherwise  SKIN: denies any unusual skin lesions  EYES: denies blurred vision or double vision  HEENT: denies nasal congestion, sinus pain or ST  LUNGS: denies shortness of breath with exertion  CARDIOVASCULAR: denies chest pain on exertion  GI: denies abdominal pain, denies heartburn  : 2 per night nocturia, no complaint of urinary incontinence  MUSCULOSKELETAL: denies back pain  NEURO: denies headaches  PSYCHE: denies depression or anxiety  HEMATOLOGIC: denies hx of anemia  ENDOCRINE: denies thyroid history  ALL/ASTHMA: denies hx of allergy or asthma    Objective:   Physical Exam  General Appearance:  Alert, cooperative, no distress, appears stated age   Head:  Normocephalic, without obvious abnormality, atraumatic   Eyes:  PERRL, conjunctiva/corneas clear, EOM's intact, both eyes   Ears:  Normal TM's and external ear canals, both ears   Nose: Nares normal, septum midline, mucosa normal, no drainage or sinus tenderness   Throat: Lips, mucosa, and tongue normal; teeth and gums normal   Neck: Supple, symmetrical, trachea midline, no adenopathy, thyroid: not enlarged, symmetric, no tenderness/mass/nodules, no carotid bruit or JVD   Back:    Symmetric, no curvature, ROM normal, no CVA tenderness   Lungs:   Clear to auscultation bilaterally, respirations unlabored   Chest Wall:  No tenderness or deformity   Heart:  Regular rate and rhythm, S1, S2 normal, no murmur, rub or gallop   Abdomen:   Soft, non-tender, bowel sounds active all four quadrants,  no masses, no organomegaly           Extremities: Extremities normal, atraumatic, no cyanosis or edema   Pulses: 2+ and symmetric   Skin: Had stitches and nonhealing wound stitches removed Skin color, texture, turgor normal, no rashes or lesions   Lymph nodes: Cervical, supraclavicular, and axillary nodes normal   Neurologic: Normal     /66   Pulse 57   Resp 14   Ht 5' 9\" (1.753 m)   Wt 225 lb 6.4 oz (102.2 kg)   SpO2 96%   BMI 33.29 kg/m²  Estimated body mass index is 33.29 kg/m² as calculated from the following:    Height as of this encounter: 5' 9\" (1.753 m).    Weight as of this encounter: 225 lb 6.4 oz (102.2 kg).    Medicare Hearing Assessment:   Hearing Screening    Time taken: 3/5/2024 10:08 AM  Screening Method: Finger Rub  Finger Rub Result: Fail (Comment: has hearing aids)               Visual Acuity:   Right Eye Visual Acuity: Corrected Right Eye Chart Acuity: 20/40   Left Eye Visual Acuity: Corrected Left Eye Chart Acuity: 20/40   Both Eyes Visual Acuity: Corrected Both Eyes Chart Acuity: 20/40   Able To Tolerate Visual Acuity: Yes        Assessment & Plan:   Casa Lozano is a 89 year old male who presents for a Medicare Assessment.     1. Stage 3b chronic kidney disease (HCC) (Primary)  2. Seizure (HCC)  3. Renal cell carcinoma, unspecified laterality (HCC)  4. Prostate cancer (HCC)  5. Primary hypertension  6. Bilateral sensorineural hearing loss  7. ARMD (age-related macular degeneration), bilateral  8. Hypothyroidism, unspecified type  -     Comp Metabolic Panel (14); Future; Expected date: 03/05/2024  -     Assay, Thyroid Stim Hormone; Future; Expected date: 03/05/2024  -      CBC With Differential With Platelet; Future; Expected date: 03/05/2024  9. Wound dehiscence  -     Plastic Surgery Referral - In Network    The patient indicates understanding of these issues and agrees to the plan.  Reinforced healthy diet, lifestyle, and exercise.      No follow-ups on file.     Khushboo Anne MD, 3/5/2024     Supplementary Documentation:   General Health:  In the past six months, have you lost more than 10 pounds without trying?: 2 - No  Has your appetite been poor?: No  Type of Diet: Balanced  How does the patient maintain a good energy level?: Other  How would you describe your daily physical activity?: Light  How would you describe your current health state?: Good  How do you maintain positive mental well-being?: Social Interaction;Visiting Family  On a scale of 0 to 10, with 0 being no pain and 10 being severe pain, what is your pain level?: 0 - (None)  In the past six months, have you experienced urine leakage?: 0-No  At any time do you feel concerned for the safety/well-being of yourself and/or your children, in your home or elsewhere?: No  Have you had any immunizations at another office such as Influenza, Hepatitis B, Tetanus, or Pneumococcal?: Yes        Casa Lozano's SCREENING SCHEDULE   Tests on this list are recommended by your physician but may not be covered, or covered at this frequency, by your insurer.   Please check with your insurance carrier before scheduling to verify coverage.   PREVENTATIVE SERVICES FREQUENCY &  COVERAGE DETAILS LAST COMPLETION DATE   Diabetes Screening    Fasting Blood Sugar / Glucose    One screening every 12 months if never tested or if previously tested but not diagnosed with pre-diabetes   One screening every 6 months if diagnosed with pre-diabetes Lab Results   Component Value Date    GLU 85 12/05/2023        Cardiovascular Disease Screening    Lipid Panel  Cholesterol  Lipoprotein (HDL)  Triglycerides Covered every 5 years for all Medicare  beneficiaries without apparent signs or symptoms of cardiovascular disease Lab Results   Component Value Date    CHOLEST 171 09/10/2018    HDL 33 09/10/2018     (H) 09/10/2018    TRIG 85 09/10/2018         Electrocardiogram (EKG)   Covered if needed at Welcome to Medicare, and non-screening if indicated for medical reasons 09/09/2018      Ultrasound Screening for Abdominal Aortic Aneurysm (AAA) Covered once in a lifetime for one of the following risk factors    Men who are 65-75 years old and have ever smoked    Anyone with a family history -     Colorectal Cancer Screening  Covered for ages 50-85; only need ONE of the following:    Colonoscopy   Covered every 10 years    Covered every 2 years if patient is at high risk or previous colonoscopy was abnormal -    No recommendations at this time    Flexible Sigmoidoscopy   Covered every 4 years -    Fecal Occult Blood Test Covered annually -   Prostate Cancer Screening    Prostate-Specific Antigen (PSA) Annually Lab Results   Component Value Date    PSA 0.07 02/08/2023     Health Maintenance   Topic Date Due    PSA  02/08/2024      Immunizations    Influenza Covered once per flu season  Please get every year 12/05/2023  No recommendations at this time    Pneumococcal Each vaccine ([id number] & [id number]) covered once after 65 Prevnar 13: 06/07/2019    Pwnjigvea39: 06/26/2020     No recommendations at this time    Hepatitis B One screening covered for patients with certain risk factors   -  No recommendations at this time    Tetanus Toxoid Not covered by Medicare Part B unless medically necessary (cut with metal); may be covered with your pharmacy prescription benefits -    Tetanus, Diptheria and Pertusis TD and TDaP Not covered by Medicare Part B -  No recommendations at this time    Zoster Not covered by Medicare Part B; may be covered with your pharmacy  prescription benefits -  Zoster Vaccines(1 of 2) Never done     Annual Monitoring of Persistent  Medications (ACE/ARB, digoxin diuretics, anticonvulsants)    Potassium Annually Lab Results   Component Value Date    K 4.2 12/05/2023         Creatinine   Annually Lab Results   Component Value Date    CREATSERUM 1.35 (H) 12/05/2023         BUN Annually Lab Results   Component Value Date    BUN 22 12/05/2023       Drug Serum Conc Annually Lab Results   Component Value Date    VALP 37.7 (L) 03/15/2023

## 2024-03-06 NOTE — PATIENT INSTRUCTIONS
Casa Lozano's SCREENING SCHEDULE   Tests on this list are recommended by your physician but may not be covered, or covered at this frequency, by your insurer.   Please check with your insurance carrier before scheduling to verify coverage.   PREVENTATIVE SERVICES FREQUENCY &  COVERAGE DETAILS LAST COMPLETION DATE   Diabetes Screening    Fasting Blood Sugar / Glucose    One screening every 12 months if never tested or if previously tested but not diagnosed with pre-diabetes   One screening every 6 months if diagnosed with pre-diabetes Lab Results   Component Value Date    GLU 85 12/05/2023        Cardiovascular Disease Screening    Lipid Panel  Cholesterol  Lipoprotein (HDL)  Triglycerides Covered every 5 years for all Medicare beneficiaries without apparent signs or symptoms of cardiovascular disease Lab Results   Component Value Date    CHOLEST 171 09/10/2018    HDL 33 09/10/2018     (H) 09/10/2018    TRIG 85 09/10/2018         Electrocardiogram (EKG)   Covered if needed at Welcome to Medicare, and non-screening if indicated for medical reasons 09/09/2018      Ultrasound Screening for Abdominal Aortic Aneurysm (AAA) Covered once in a lifetime for one of the following risk factors   • Men who are 65-75 years old and have ever smoked   • Anyone with a family history -     Colorectal Cancer Screening  Covered for ages 50-85; only need ONE of the following:    Colonoscopy   Covered every 10 years    Covered every 2 years if patient is at high risk or previous colonoscopy was abnormal -    No recommendations at this time    Flexible Sigmoidoscopy   Covered every 4 years -    Fecal Occult Blood Test Covered annually -   Prostate Cancer Screening    Prostate-Specific Antigen (PSA) Annually Lab Results   Component Value Date    PSA 0.07 02/08/2023     Health Maintenance   Topic Date Due   • PSA  02/08/2024      Immunizations    Influenza Covered once per flu season  Please get every year 12/05/2023  No  recommendations at this time    Pneumococcal Each vaccine (Qzyhtid16 & [id number]) covered once after 65 Prevnar 13: 06/07/2019    Smhjzydnf59: 06/26/2020     No recommendations at this time    Hepatitis B One screening covered for patients with certain risk factors   -  No recommendations at this time    Tetanus Toxoid Not covered by Medicare Part B unless medically necessary (cut with metal); may be covered with your pharmacy prescription benefits -    Tetanus, Diptheria and Pertusis TD and TDaP Not covered by Medicare Part B -  No recommendations at this time    Zoster Not covered by Medicare Part B; may be covered with your pharmacy  prescription benefits -  Zoster Vaccines(1 of 2) Never done     Annual Monitoring of Persistent Medications (ACE/ARB, digoxin diuretics, anticonvulsants)    Potassium Annually Lab Results   Component Value Date    K 4.2 12/05/2023         Creatinine   Annually Lab Results   Component Value Date    CREATSERUM 1.35 (H) 12/05/2023         BUN Annually Lab Results   Component Value Date    BUN 22 12/05/2023       Drug Serum Conc Annually Lab Results   Component Value Date    VALP 37.7 (L) 03/15/2023

## 2024-03-07 ENCOUNTER — OFFICE VISIT (OUTPATIENT)
Dept: SURGERY | Facility: CLINIC | Age: 89
End: 2024-03-07
Payer: MEDICARE

## 2024-03-07 DIAGNOSIS — S81.802A UNSPECIFIED OPEN WOUND, LEFT LOWER LEG, INITIAL ENCOUNTER: Primary | ICD-10-CM

## 2024-03-07 PROCEDURE — 99204 OFFICE O/P NEW MOD 45 MIN: CPT | Performed by: PLASTIC SURGERY

## 2024-03-07 NOTE — H&P
Injury 1: Left lower leg laceration  - Date: 02/23/24  - Days Since: 13     Casa Lozano is a 89 year old male that presents with   Chief Complaint   Patient presents with    Injury     L lower leg laceration   .    REFERRED BY:  Khushboo Anne      Pacemaker: No  Latex Allergy: no  Coumadin: No  Plavix: No  Other anticoagulants: No  Diet medication: No  Cardiac stents: No    HAND DOMINANCE:  Right    Profession: retired    RECONSTRUCTIVE HISTORY    SUN EXPOSURE   Current no   Past no   Sunburns no   Tanning salons current no   Tanning salons past no     SKIN CANCER    Personal history of skin cancer: none      HPI:       Injury 1: Left leg avulsion flap, sutured, seen 13 days post injury  - Date: 02/23/24  - Days Since: 13      89-year-old male with a left leg avulsion flap    A box fell of the back of the truck onto his leg    ER, sutured    Applying Neosporin and gauze daily, not washing    3/5 Dr. Anne removed sutures    Normal saline and peroxide once since sutures removed, Neosporin               Review of Systems:   Constitutional: No change in appetite, chill/rigors, or fatigue  GI: No jaundice  Endocrine: No generalized weakness  Neurological: No aphasia, loss of consciousness, or seizures      Integumentary:     WOUND     Duration Lacerated 2/23/24    Location L lower leg    Mechanism Plastic box with papers in it fell out of back of truck on to pt's L leg    Pain  Intermittent \"stinging\" worse to touch. Rates pain 5/10.  Not taking anagesics.    Treatment Seen in ER 2/23/24 sutured. Then daily was applying neosporin, gauze and tape.  Not washing.                           3/5/24 Dr Anne removed sutures.     Local             Cleaned with normal saline and peroxide X 1 since sutures out, applying neosporin,gauze and new tape daily.  Walking without difficulty.  L lower anterior leg with gauze and tape CDI,removed.  Wound with flap damage,small amount of bloody drainage.        PMH:     MEDICAL  Past  Medical History:   Diagnosis Date    Arthritis     Cancer (HCC)     CME (cystoid macular edema) 04/18/2014    IVK injections by Dr. Nava    Dermatochalasis of eyelid 12/04/2014    Dermatochalasis of eyelid 12/04/2014    H/O prostate biopsy 2011    Hearing impairment     High cholesterol     History of vitrectomy 09/21/2016    History of vitrectomy 09/21/2016    Hyperlipidemia     Macular degeneration     Prostate cancer (HCC)     Pseudophakia of both eyes 12/04/2014    Pseudophakia of both eyes 12/04/2014    RPE mottling of macula left eye 12/04/2014    Seizure disorder (HCC)     TIA (transient ischemic attack)     Unspecified essential hypertension     Vitreous floaters of right eye 12/04/2014    Vitreous floaters of right eye 12/04/2014        SURGICAL  Past Surgical History:   Procedure Laterality Date    APPENDECTOMY      BEVACIZUMAB, 10MG Bilateral 2021    Avastin injection 9/16/21 OU by Dr. Magdaleno     CATARACT EXTRACTION W/  INTRAOCULAR LENS IMPLANT Right 2/17/14    RJM    CATARACT EXTRACTION W/  INTRAOCULAR LENS IMPLANT Left 5/3/10    RJM    CHOLECYSTECTOMY      COLONOSCOPY      Per NG: adenoma    KNEE REPLACEMENT SURGERY Left approx. 2006    OTHER SURGICAL HISTORY Right 2014    Per NG: Post op CME -- IVKcryo kidney tumor    VITRECTOMY,MECHANICAL Left 9/1/10    for macular pucker with CME with Dr. Magdaleno    VITRECTOMY,MECHANICAL Right 5/18/16    Dr. Magdaleno    VITRECTOMY,MECHANICAL Left 1997 / 2010     Macular Puckering with CME        ALLERIGIES  No Known Allergies     MEDICATIONS  Current Outpatient Medications   Medication Sig Dispense Refill    lisinopril 10 MG Oral Tab Take 1 tablet (10 mg total) by mouth 2 (two) times daily. 180 tablet 3    amLODIPine 5 MG Oral Tab Take 1 tablet (5 mg total) by mouth daily. 90 tablet 0    levothyroxine 50 MCG Oral Tab Take 1 tablet (50 mcg total) by mouth before breakfast. 90 tablet 3    metoprolol succinate ER 50 MG Oral Tablet 24 Hr Take 1 tablet (50 mg  total) by mouth daily. 90 tablet 3    amLODIPine 5 MG Oral Tab Take 1 tablet (5 mg total) by mouth daily. 90 tablet 0    cholecalciferol 50 MCG (2000 UT) Oral Tab Take 1 tablet (2,000 Units total) by mouth daily. 90 tablet 3    divalproex  MG Oral Tablet 24 Hr Take 1 tablet (500 mg total) by mouth daily. 90 tablet 3    levetiracetam 750 MG Oral Tab Take 1 tablet (750 mg total) by mouth 2 (two) times daily. 180 tablet 3    Magnesium 100 MG Oral Tab Take by mouth.      aspirin 81 MG Oral Tab EC Take 1 tablet (81 mg total) by mouth daily.          SOCIAL HISTORY  Social History     Socioeconomic History    Marital status:    Tobacco Use    Smoking status: Former     Types: Cigarettes     Quit date: 1960     Years since quittin.1    Smokeless tobacco: Never   Vaping Use    Vaping Use: Never used   Substance and Sexual Activity    Alcohol use: No     Alcohol/week: 0.0 standard drinks of alcohol    Drug use: No   Other Topics Concern    Caffeine Concern Yes     Comment: 1-2 cups coffee per day    Exercise Yes     Comment: Walks to the store, 1 mile, twice per week    Pt has a pacemaker No    Pt has a defibrillator No    Reaction to local anesthetic No   Social History Narrative    The patient does not use an assistive device..      The patient does live in a home with stairs.        FAMILY HISTORY  Family History   Problem Relation Age of Onset    Diabetes Neg     Glaucoma Neg     Macular degeneration Neg           PHYSICAL EXAM:     CONSTITUTIONAL: Overall appearance - Normal  HEENT: Normocephalic  EYES: Conjunctiva - Right: Normal, Left: Normal; EOMI  EARS: Inspection - Right: Normal, Left: Normal  NECK/THYROID: Inspection - Normal, Palpation - Normal, Thyroid gland - Normal, No adenopathy  RESPIRATORY: Inspection - Normal, Effort - Normal  CARDIOVASCULAR: Regular rhythm, No murmurs  ABDOMEN: Inspection - Normal, No abdominal tenderness  NEURO: Memory intact  PSYCH: Oriented to person, place,  time, and situation, Appropriate mood and affect      Physical Exam:       Left foot dorsalis pedis 1+, posterior tibial not palpable  Moderate edema ankle and foot    4 x 3 cm triangular-shaped avulsion flap with chronic flap  Underlying granulation  No infection        ASSESSMENT/PLAN:       WOUND(S) left leg avulsion flap with flap necrosis        Risk Factors which may Negatively Impact Healing    Age and Anatomic location      We had a long discussion.   I explained to the patient the nature of the wound(s) and we had a long discussion regarding management of this wound(s).  I explained treatment options and answered the patient's questions.  The patient wishes to proceed with wound management.  If this heals, the area will have a permanent scar.    If this does not epithelialize, he will need a skin graft    TREATMENT:    Wash daily  Silvadene, gauze, Spandage    1 week      3/7/2024  Guillaume Gregory MD          +++++++++++++++++++++++++++++++++++++++++      MEDICAL DECISION MAKING    PROBLEMS      MODERATE    (number / complexity)          Acute complicated injury    DATA         STRAIGHTFORWARD    (amount / complexity)           MANAGEMENT RISK  MODERATE    (complications/ morbidity)       Silvadene                  MDM LEVEL    MODERATE

## 2024-03-07 NOTE — PROGRESS NOTES
Per Dr Gregory pt and niece given written, verbal and demonstration of wound care.  Wash daily with soap and water in the shower then apply silvadene,gauze and spandage.  Given extra spandage for home  Verbalized understanding.

## 2024-03-14 ENCOUNTER — OFFICE VISIT (OUTPATIENT)
Dept: SURGERY | Facility: CLINIC | Age: 89
End: 2024-03-14
Payer: MEDICARE

## 2024-03-14 DIAGNOSIS — S81.802A UNSPECIFIED OPEN WOUND, LEFT LOWER LEG, INITIAL ENCOUNTER: Primary | ICD-10-CM

## 2024-03-14 PROCEDURE — 99214 OFFICE O/P EST MOD 30 MIN: CPT | Performed by: PLASTIC SURGERY

## 2024-03-14 NOTE — PROGRESS NOTES
Injury 1: Left leg avulsion flap, sutured, seen 13 days post injury  - Date: 02/23/24  - Days Since: 20     Washing daily with soap and water, silvadene, gauze, and spandage     20 days post injury  No complaints.  No pain.    Peripheral granulations, clean, noninfected    The central flap which was dusky, is now adherent and may well take.    No surgery at this point    Wash daily, Silvadene, gauze, Spandage    2 weeks      +++++++++++++++++++++++++++++++++++++++++      MEDICAL DECISION MAKING    PROBLEMS      MODERATE    (number / complexity)          Acute complicated injury    DATA         STRAIGHTFORWARD    (amount / complexity)              MANAGEMENT RISK  MODERATE    (complications/ morbidity)       Silvadene                  [facility] LEVEL    MODERATE

## 2024-03-15 ENCOUNTER — TELEPHONE (OUTPATIENT)
Dept: NEUROLOGY | Facility: CLINIC | Age: 89
End: 2024-03-15

## 2024-03-15 DIAGNOSIS — G40.909 SEIZURE DISORDER (HCC): ICD-10-CM

## 2024-03-15 NOTE — TELEPHONE ENCOUNTER
Pt was scheduled to see provider initially on 3/15. Pt cancelled apt via [facility] on 3/13. Pt ended up still showing up for the apt on 3/15, and stated no one within the family cancelled the apt. They are looking to hopefully be seen sooner than later by dr Whipple. Please advise where pt can be seen.

## 2024-03-16 NOTE — TELEPHONE ENCOUNTER
Protocol Failed/ No Protocol    Requested Prescriptions   Pending Prescriptions Disp Refills    amLODIPine 5 MG Oral Tab 90 tablet 0     Sig: Take 1 tablet (5 mg total) by mouth daily.       Hypertension Medications Protocol Failed - 3/14/2024 12:38 PM        Failed - EGFRCR or GFRNAA > 50     GFR Evaluation  EGFRCR: 49 , resulted on 12/5/2023          Passed - CMP or BMP in past 12 months        Passed - Last BP reading less than 140/90     BP Readings from Last 1 Encounters:   03/05/24 124/66               Passed - In person appointment or virtual visit in the past 12 mos or appointment in next 3 mos     Recent Outpatient Visits              2 days ago Unspecified open wound, left lower leg, initial encounter    [facility]    Office Visit    1 week ago Unspecified open wound, left lower leg, initial encounter    [facility]    Office Visit    1 week ago Stage 3b chronic kidney disease (HCC)    [facility]    Office Visit    2 months ago Pain in both feet    [facility]    Office Visit    3 months ago Essential hypertension    [facility], Khushboo Auguste MD    Office Visit          Future Appointments         Provider Department Appt Notes    In 1 week [facility] Ryder MD [facility]    In 3 weeks [facility]                    Future Appointments         Provider Department Appt Notes    In 1 week [facility]AC[facility]    In 3 weeks Alicia Chin DPM [facility]          Recent Outpatient Visits              2 days ago Unspecified open wound, left lower leg, initial encounter    [facility], Guillaume Vazquez MD    Office Visit    1 week ago Unspecified open wound, left lower leg, initial encounter    [facility], Guillaume Vazquez MD    Office Visit    1 week ago Stage 3b chronic kidney disease (HCC)    [facility]    Office Visit    2 months ago Pain in both feet    [facility]    Office Visit    3 months ago Essential hypertension    [facility], Khushboo Auguste MD    Office Visit

## 2024-03-17 NOTE — TELEPHONE ENCOUNTER
Protocol Failed/ No Protocol    Requested Prescriptions   Pending Prescriptions Disp Refills    AMLODIPINE 5 MG Oral Tab [Pharmacy Med Name: Amlodipine Besylate 5 Mg Tab Unic] 90 tablet 0     Sig: TAKE ONE TABLET BY MOUTH ONE TIME DAILY       Hypertension Medications Protocol Failed - 3/15/2024 12:07 PM        Failed - EGFRCR or GFRNAA > 50     GFR Evaluation  EGFRCR: 49 , resulted on 12/5/2023          Passed - CMP or BMP in past 12 months        Passed - Last BP reading less than 140/90     BP Readings from Last 1 Encounters:   03/05/24 124/66               Passed - In person appointment or virtual visit in the past 12 mos or appointment in next 3 mos     Recent Outpatient Visits              3 days ago Unspecified open wound, left lower leg, initial encounter    [facility]    [facility] Visit    1 week ago Unspecified open wound, left lower leg, initial encounter    [facility]    Office Visit    1 week ago Stage 3b chronic kidney disease (HCC)    [facility]    Office Visit    2 months ago [facility] in both feet    [facility]    Office Visit    3 months ago Essential hypertension    [facility]    Office Visit          Future Appointments         Provider Department Appt Notes    In 1 week [facility]    In 3 weeks [facility]                    Future Appointments         Provider Department Appt Notes    In 1 week [facility]    In  3 weeks [facility]          Recent Outpatient Visits              3 days ago Unspecified open wound, left lower leg, initial encounter    [facility], Guillaume Vazquez MD    Office Visit    1 week ago Unspecified open wound, left lower leg, initial encounter    [facility], Guillaume Vazquez MD    Office Visit    1 week ago Stage 3b chronic kidney disease (HCC)    [facility], Khushboo Auguste MD    Office Visit    2 months ago Pain in both feet    [facility]    Office Visit    3 months ago Essential hypertension    [facility] [place]    Office Visit

## 2024-03-18 ENCOUNTER — TELEPHONE (OUTPATIENT)
Dept: NEUROLOGY | Facility: CLINIC | Age: 89
End: 2024-03-18

## 2024-03-18 NOTE — TELEPHONE ENCOUNTER
Medication Detail    Medication Quantity Refills Start End   divalproex  MG Oral Tablet 24 Hr 90 tablet 3 3/15/2023 --   Sig:   Take 1 tablet (500 mg total) by mouth daily.     Route:   Oral     Note to Pharmacy:   new dr     Order #:   [id number]             Medication Quantity Refills Start End   levetiracetam 750 MG Oral Tab 180 tablet 3 3/15/2023 --   Sig:   Take 1 tablet (750 mg total) by mouth 2 (two) times daily.     Route:   Oral     Order #:   [id number]           Please review and sign if appropriate       LOV:03/15/2023  NOV:  offering date 03/25/24     Last refill/[place]: 03/15/23(#90 / 3 refills)(#180/3 refills)

## 2024-03-18 NOTE — TELEPHONE ENCOUNTER
Called and spoke to patient daughter Vashti. Patient Identifier acknowledged. Patient daughter confirmed appointment date 3/25/2025 at 11 am at [facility]. She thanked us for calling .

## 2024-03-19 RX ORDER — AMLODIPINE BESYLATE 5 MG/1
5 TABLET ORAL DAILY
Qty: 90 TABLET | Refills: 3 | Status: SHIPPED | OUTPATIENT
Start: 2024-03-19

## 2024-03-19 RX ORDER — LEVETIRACETAM 750 MG/1
750 TABLET ORAL 2 TIMES DAILY
Qty: 180 TABLET | Refills: 0 | Status: SHIPPED | OUTPATIENT
Start: 2024-03-19 | End: 2024-03-25

## 2024-03-19 RX ORDER — DIVALPROEX SODIUM 500 MG/1
500 TABLET, EXTENDED RELEASE ORAL DAILY
Qty: 90 TABLET | Refills: 0 | Status: SHIPPED | OUTPATIENT
Start: 2024-03-19 | End: 2024-03-25

## 2024-03-25 ENCOUNTER — OFFICE VISIT (OUTPATIENT)
Dept: NEUROLOGY | Facility: CLINIC | Age: 89
End: 2024-03-25
Payer: MEDICARE

## 2024-03-25 ENCOUNTER — LAB ENCOUNTER (OUTPATIENT)
Dept: LAB | Facility: HOSPITAL | Age: 89
End: 2024-03-25
Attending: Other
Payer: MEDICARE

## 2024-03-25 VITALS — HEIGHT: 69 IN | WEIGHT: 225 LBS | BODY MASS INDEX: 33.33 KG/M2

## 2024-03-25 DIAGNOSIS — G40.909 SEIZURE DISORDER (HCC): ICD-10-CM

## 2024-03-25 DIAGNOSIS — G40.909 SEIZURE DISORDER (HCC): Primary | ICD-10-CM

## 2024-03-25 LAB
ALBUMIN SERPL-MCNC: 3.8 G/DL (ref 3.2–4.8)
ALBUMIN/GLOB SERPL: 1.5 {RATIO} (ref 1–2)
ALP LIVER SERPL-CCNC: 73 U/L
ALT SERPL-CCNC: 25 U/L
ANION GAP SERPL CALC-SCNC: 5 MMOL/L (ref 0–18)
AST SERPL-CCNC: 23 U/L (ref ?–34)
BASOPHILS # BLD AUTO: 0.04 X10(3) UL (ref 0–0.2)
BASOPHILS NFR BLD AUTO: 0.6 %
BILIRUB SERPL-MCNC: 0.4 MG/DL (ref 0.2–0.9)
BUN BLD-MCNC: 19 MG/DL (ref 9–23)
BUN/CREAT SERPL: 14.2 (ref 10–20)
CALCIUM BLD-MCNC: 9.4 MG/DL (ref 8.7–10.4)
CHLORIDE SERPL-SCNC: 111 MMOL/L (ref 98–112)
CO2 SERPL-SCNC: 29 MMOL/L (ref 21–32)
CREAT BLD-MCNC: 1.34 MG/DL
DEPRECATED RDW RBC AUTO: 40.2 FL (ref 35.1–46.3)
EGFRCR SERPLBLD CKD-EPI 2021: 49 ML/MIN/1.73M2 (ref 60–?)
EOSINOPHIL # BLD AUTO: 0.12 X10(3) UL (ref 0–0.7)
EOSINOPHIL NFR BLD AUTO: 1.8 %
ERYTHROCYTE [DISTWIDTH] IN BLOOD BY AUTOMATED COUNT: 12.3 % (ref 11–15)
FASTING STATUS PATIENT QL REPORTED: NO
GLOBULIN PLAS-MCNC: 2.6 G/DL (ref 2.8–4.4)
GLUCOSE BLD-MCNC: 85 MG/DL (ref 70–99)
HCT VFR BLD AUTO: 41.8 %
HGB BLD-MCNC: 14.1 G/DL
IMM GRANULOCYTES # BLD AUTO: 0.02 X10(3) UL (ref 0–1)
IMM GRANULOCYTES NFR BLD: 0.3 %
LYMPHOCYTES # BLD AUTO: 1.43 X10(3) UL (ref 1–4)
LYMPHOCYTES NFR BLD AUTO: 21.1 %
MCH RBC QN AUTO: 29.9 PG (ref 26–34)
MCHC RBC AUTO-ENTMCNC: 33.7 G/DL (ref 31–37)
MCV RBC AUTO: 88.7 FL
MONOCYTES # BLD AUTO: 0.71 X10(3) UL (ref 0.1–1)
MONOCYTES NFR BLD AUTO: 10.5 %
NEUTROPHILS # BLD AUTO: 4.46 X10 (3) UL (ref 1.5–7.7)
NEUTROPHILS # BLD AUTO: 4.46 X10(3) UL (ref 1.5–7.7)
NEUTROPHILS NFR BLD AUTO: 65.7 %
OSMOLALITY SERPL CALC.SUM OF ELEC: 302 MOSM/KG (ref 275–295)
PLATELET # BLD AUTO: 233 10(3)UL (ref 150–450)
POTASSIUM SERPL-SCNC: 4.8 MMOL/L (ref 3.5–5.1)
PROT SERPL-MCNC: 6.4 G/DL (ref 5.7–8.2)
RBC # BLD AUTO: 4.71 X10(6)UL
SODIUM SERPL-SCNC: 145 MMOL/L (ref 136–145)
VALPROATE SERPL-MCNC: 27.4 UG/ML (ref 50–100)
WBC # BLD AUTO: 6.8 X10(3) UL (ref 4–11)

## 2024-03-25 PROCEDURE — 80177 DRUG SCRN QUAN LEVETIRACETAM: CPT

## 2024-03-25 PROCEDURE — 85025 COMPLETE CBC W/AUTO DIFF WBC: CPT | Performed by: OTHER

## 2024-03-25 PROCEDURE — 80164 ASSAY DIPROPYLACETIC ACD TOT: CPT

## 2024-03-25 PROCEDURE — 36415 COLL VENOUS BLD VENIPUNCTURE: CPT | Performed by: OTHER

## 2024-03-25 PROCEDURE — 80053 COMPREHEN METABOLIC PANEL: CPT | Performed by: OTHER

## 2024-03-25 PROCEDURE — 99214 OFFICE O/P EST MOD 30 MIN: CPT | Performed by: OTHER

## 2024-03-25 RX ORDER — DIVALPROEX SODIUM 500 MG/1
500 TABLET, EXTENDED RELEASE ORAL DAILY
Qty: 90 TABLET | Refills: 3 | Status: SHIPPED | OUTPATIENT
Start: 2024-03-25

## 2024-03-25 RX ORDER — LEVETIRACETAM 750 MG/1
750 TABLET ORAL 2 TIMES DAILY
Qty: 180 TABLET | Refills: 3 | Status: SHIPPED | OUTPATIENT
Start: 2024-03-25

## 2024-03-25 NOTE — PROGRESS NOTES
Neurology Follow up Visit     Referred By: Dr. Rosales ref. provider found    Chief Complaint:   Chief Complaint   Patient presents with    Follow - Up     LOV 3/15/23 - The pt presents as a yearly follow up for history of seizures. Currently taking Keppra 750bid and Depakote 500mg every day. Pt has been seizure free since last seen. Pt denies any issues at this time.       HPI:     Casa Lozano is a 89 year old male, who presents for follow-up of epilepsy.  Patient with a history of epilepsy for many years.  He typically starts with unusual distinct smell sensation,  warm sensation coming over him, sometimes sweating.  After that he gets dazed and occasionally he will lose consciousness and has a staring episode.  He does not have typically any tonic-clonic events.  He has been seeing Dr. Escobar in the past.  He has been on Keppra 750 mg, at one point in the past he was in the thousand milligrams, and apparently while it did control his epilepsy he might have felt more groggy and drowsiness and therefore the dose was decreased in September 2017 back to 750 mg twice a day.  He has done well with it except in September 2018 when he had at least 3 or 4 staring episodes and was brought to the hospital.  He does live on his own and that particular day he was surrounded by family due to some event planning.  Patient denied any focal neurological symptoms.  He does have history of poor sleep, frequent and vivid dreams, he feels tired and not rested and takes frequent naps in the daytime.    At this point continues EEG was done for 24 hours.  No obvious seizure activity but sleep was clearly disturbed.  Additionally patient was started on Depakote, later the dose of Keppra was decreased from 1000 mg down to 750 mg twice a day.  He is has done quite well in terms of his seizures since then as reported in March 2023.  He was seen Dr Pearce and Dr. Miner in the interim.    Patient also has had sleep study done in September  2018, that showed moderate if not severe obstructive sleep apnea, patient however he declined to be treated at that time with a CPAP machine.  He was reporting significant tiredness in the follow-up visit in March 2023.  Level of Depakote was slightly low, level of Keppra was slightly high.    Patient came back for follow-up in March 2024 no seizures, still feeling tired.  Doing relatively well otherwise.  .     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 Nava    Dermatochalasis of eyelid 12/04/2014    Dermatochalasis of eyelid 12/04/2014    H/O prostate biopsy 2011    Hearing impairment     High cholesterol     History of vitrectomy 09/21/2016    History of vitrectomy 09/21/2016    Hyperlipidemia     Macular degeneration     Prostate cancer (HCC)     Pseudophakia of both eyes 12/04/2014    Pseudophakia of both eyes 12/04/2014    RPE mottling of macula left eye 12/04/2014    Seizure disorder (HCC)     TIA (transient ischemic attack)     Unspecified essential hypertension     Vitreous floaters of right eye 12/04/2014    Vitreous floaters of right eye 12/04/2014       Past Surgical History:   Procedure Laterality Date    APPENDECTOMY      BEVACIZUMAB, 10MG Bilateral 2021    Avastin injection 9/16/21 OU by Dr. Magdaleno     CATARACT EXTRACTION W/  INTRAOCULAR LENS IMPLANT Right 2/17/14    RJM    CATARACT EXTRACTION W/  INTRAOCULAR LENS IMPLANT Left 5/3/10    RJM    CHOLECYSTECTOMY      COLONOSCOPY      Per NG: adenoma    KNEE REPLACEMENT SURGERY Left approx. 2006    OTHER SURGICAL HISTORY Right 2014    Per NG: Post op CME -- IVKcryo kidney tumor    VITRECTOMY,MECHANICAL Left 9/1/10    for macular pucker with CME with Dr. Sharla COX,MECHANICAL Right 5/18/16    Dr. Magdaleno    VITRECTOMY,MECHANICAL Left 1997 / 2010     Macular Puckering with CME       Social history:  History   Smoking Status    Former    Types:  Cigarettes    Quit date: 1/26/1960   Smokeless Tobacco    Never     History   Alcohol Use No     History   Drug Use No       Family History   Problem Relation Age of Onset    Diabetes Neg     Glaucoma Neg     Macular degeneration Neg          Current Outpatient Medications:     amLODIPine 5 MG Oral Tab, Take 1 tablet (5 mg total) by mouth daily., Disp: 90 tablet, Rfl: 3    amLODIPine 5 MG Oral Tab, Take 1 tablet (5 mg total) by mouth daily., Disp: 90 tablet, Rfl: 3    divalproex  MG Oral Tablet 24 Hr, Take 1 tablet (500 mg total) by mouth daily., Disp: 90 tablet, Rfl: 0    levetiracetam 750 MG Oral Tab, Take 1 tablet (750 mg total) by mouth 2 (two) times daily., Disp: 180 tablet, Rfl: 0    silver sulfADIAZINE 1 % External Cream, Apply 25 g topically daily., Disp: , Rfl:     lisinopril 10 MG Oral Tab, Take 1 tablet (10 mg total) by mouth 2 (two) times daily., Disp: 180 tablet, Rfl: 3    levothyroxine 50 MCG Oral Tab, Take 1 tablet (50 mcg total) by mouth before breakfast., Disp: 90 tablet, Rfl: 3    metoprolol succinate ER 50 MG Oral Tablet 24 Hr, Take 1 tablet (50 mg total) by mouth daily., Disp: 90 tablet, Rfl: 3    amLODIPine 5 MG Oral Tab, Take 1 tablet (5 mg total) by mouth daily., Disp: 90 tablet, Rfl: 0    cholecalciferol 50 MCG (2000 UT) Oral Tab, Take 1 tablet (2,000 Units total) by mouth daily., Disp: 90 tablet, Rfl: 3    Magnesium 100 MG Oral Tab, Take by mouth., Disp: , Rfl:     aspirin 81 MG Oral Tab EC, Take 1 tablet (81 mg total) by mouth daily., Disp: , Rfl:     No Known Allergies    ROS:   As in HPI, the rest of the 14 system review was done and was negative      Physical Exam:  Vitals:    03/25/24 1101   Weight: 225 lb (102.1 kg)   Height: 69\"       General: No apparent distress, well nourished, well groomed.  Head- Normocephalic, atraumatic  Eyes- No redness or swelling  ENT- Hearing intake, normal glutition  Neck- No masses or adenopathy    Neurological:     Mental Status- Alert and oriented  x3.  Normal attention span and concentration  Thought process intact  Memory intact- recent and remote  Mood intact  Fund of knowledge appropriate for education and age    Language intact including: comprehension, naming, repetition, vocabulary    Cranial Nerves:    VII. Face symmetric, no facial weakness  VIII. Hearing intact to whisper.  IX. Pallet elevates symmetrically.  XI. Shoulder shrug is intact  XII. Tongue is midline    Motor Exam:  Muscle tone normal  No atrophy or fasciculations  Strength- upper extremities 5/5 proximally and distally                    Rapid alternating movements intact    Gait:  Normal posture  Normal physiologic      Labs:    Lab Results   Component Value Date    TSH 2.000 02/08/2023     Lab Results   Component Value Date    HDL 33 09/10/2018     (H) 09/10/2018    TRIG 85 09/10/2018     Lab Results   Component Value Date    HGB 13.8 03/15/2023    HCT 41.7 03/15/2023    MCV 89.7 03/15/2023    WBC 7.3 03/15/2023    .0 03/15/2023      Lab Results   Component Value Date    BUN 22 12/05/2023    CA 9.4 12/05/2023    ALT 28 03/15/2023    AST 21 03/15/2023    ALKPHOS 82 01/15/2016    ALB 3.2 (L) 03/15/2023     12/05/2023    K 4.2 12/05/2023     12/05/2023    CO2 30.0 12/05/2023      I have reviewed labs.      Assessment   1. Seizure disorder (HCC)  Well-controlled with current combination of Depakote and Keppra.  We talked about expectations and prognosis.  Importance of treating sleep apnea was emphasized.  In the meantime medications dosing will be refilled the same dose, but levels will be done.    - divalproex  MG Oral Tablet 24 Hr; Take 1 tablet (500 mg total) by mouth daily.  Dispense: 90 tablet; Refill: 3  - levetiracetam 750 MG Oral Tab; Take 1 tablet (750 mg total) by mouth 2 (two) times daily.  Dispense: 180 tablet; Refill: 3  - CBC WITH DIFFERENTIAL WITH PLATELET  - COMP METABOLIC PANEL (14)  - LEVETIRACETAM, S; Future  - VALPROIC ACID, (DEPAKENE);  Future           Education and counseling provided to patient. Instructed patient to call my office or seek medical attention immediately if symptoms worsen.  Patient verbalized understanding of information given. All questions were answered. All side effects of drugs were discussed.     Return to clinic in: No follow-ups on file.    Carroll Whipple MD

## 2024-03-27 LAB — LEVETIRACETAM LVL: 40.9 UG/ML

## 2024-03-28 ENCOUNTER — OFFICE VISIT (OUTPATIENT)
Dept: SURGERY | Facility: CLINIC | Age: 89
End: 2024-03-28

## 2024-03-28 DIAGNOSIS — S81.802A UNSPECIFIED OPEN WOUND, LEFT LOWER LEG, INITIAL ENCOUNTER: Primary | ICD-10-CM

## 2024-03-28 PROCEDURE — 99214 OFFICE O/P EST MOD 30 MIN: CPT | Performed by: PLASTIC SURGERY

## 2024-03-28 NOTE — PROGRESS NOTES
Injury 1: Left leg avulsion flap, sutured, seen 13 days post injury  - Date: 02/23/24  - Days Since: 34    Washing daily soap and water applying silvadene gauze and spandage    34 days postinjury  No complaints.  No pain.    Eschar is soft and slowly , but still adherent  Peripheral granulation  Peripheral epithelialization  No infection    Will continue Silvadene, but this may ultimately require debridement and skin grafting    Wash daily, Silvadene, gauze, Spandage    2 weeks      +++++++++++++++++++++++++++++++++++++++++      MEDICAL DECISION MAKING    PROBLEMS      MODERATE    (number / complexity)          Acute complicated injury    DATA         STRAIGHTFORWARD    (amount / complexity)              MANAGEMENT RISK  MODERATE    (complications/ morbidity)       Silvadene                  [facility] LEVEL    MODERATE

## 2024-04-11 ENCOUNTER — OFFICE VISIT (OUTPATIENT)
Dept: SURGERY | Facility: CLINIC | Age: 89
End: 2024-04-11
Payer: MEDICARE

## 2024-04-11 ENCOUNTER — OFFICE VISIT (OUTPATIENT)
Dept: PODIATRY CLINIC | Facility: CLINIC | Age: 89
End: 2024-04-11
Payer: MEDICARE

## 2024-04-11 DIAGNOSIS — B35.1 ONYCHOMYCOSIS: ICD-10-CM

## 2024-04-11 DIAGNOSIS — M79.672 PAIN IN BOTH FEET: Primary | ICD-10-CM

## 2024-04-11 DIAGNOSIS — S81.802A UNSPECIFIED OPEN WOUND, LEFT LOWER LEG, INITIAL ENCOUNTER: Primary | ICD-10-CM

## 2024-04-11 DIAGNOSIS — M79.671 PAIN IN BOTH FEET: Primary | ICD-10-CM

## 2024-04-11 PROCEDURE — 99213 OFFICE O/P EST LOW 20 MIN: CPT | Performed by: STUDENT IN AN ORGANIZED HEALTH CARE EDUCATION/TRAINING PROGRAM

## 2024-04-11 PROCEDURE — 99214 OFFICE O/P EST MOD 30 MIN: CPT | Performed by: PLASTIC SURGERY

## 2024-04-11 NOTE — PROGRESS NOTES
Per Dr Gregory evaluation, pt to continue washing L leg wound daily w/ soap and water, applying Silvadene, gauze, and Spandage.  Re-dressed pt's L lower leg wound as listed above.  Pt stated dressing fits comfortably.  Pt given extra gauze and Spandage for home use.  Per Dr Gregory, pt will need to make a FU appt to be seen in one month.  Instructed pt to call the office w/ any further questions or concerns.  Pt verbalized understanding of instructions.

## 2024-04-11 NOTE — PROGRESS NOTES
[facility] Podiatry  Progress Note      Casa Lozano is a 89 year old male.   Chief Complaint   Patient presents with    Toenail Care     F/u Toenail Care. No pain              HPI:     Patient presents to clinic today for toenail care to bilateral feet.  He is accompanied by his daughter.    Allergies: Patient has no known allergies.    Current Outpatient Medications   Medication Sig Dispense Refill    silver sulfADIAZINE 1 % External Cream Apply 1 Application topically daily. 85 g 0    divalproex  MG Oral Tablet 24 Hr Take 1 tablet (500 mg total) by mouth daily. 90 tablet 3    levetiracetam 750 MG Oral Tab Take 1 tablet (750 mg total) by mouth 2 (two) times daily. 180 tablet 3    amLODIPine 5 MG Oral Tab Take 1 tablet (5 mg total) by mouth daily. 90 tablet 3    amLODIPine 5 MG Oral Tab Take 1 tablet (5 mg total) by mouth daily. 90 tablet 3    lisinopril 10 MG Oral Tab Take 1 tablet (10 mg total) by mouth 2 (two) times daily. 180 tablet 3    levothyroxine 50 MCG Oral Tab Take 1 tablet (50 mcg total) by mouth before breakfast. 90 tablet 3    metoprolol succinate ER 50 MG Oral Tablet 24 Hr Take 1 tablet (50 mg total) by mouth daily. 90 tablet 3    amLODIPine 5 MG Oral Tab Take 1 tablet (5 mg total) by mouth daily. 90 tablet 0    cholecalciferol 50 MCG (2000 UT) Oral Tab Take 1 tablet (2,000 Units total) by mouth daily. 90 tablet 3    Magnesium 100 MG Oral Tab Take by mouth.      aspirin 81 MG Oral Tab EC Take 1 tablet (81 mg total) by mouth daily.        Past Medical History:    Arthritis    Cancer (HCC)    CME (cystoid macular edema)    IVK injections by Dr. Nava    Dermatochalasis of eyelid    Dermatochalasis of eyelid    H/O prostate biopsy    Hearing impairment    High cholesterol    History of vitrectomy    History of vitrectomy    Hyperlipidemia    Macular degeneration    Prostate cancer (HCC)    Pseudophakia of both eyes    Pseudophakia of both eyes    RPE mottling of macula left eye     Seizure disorder (HCC)    TIA (transient ischemic attack)    Unspecified essential hypertension    Vitreous floaters of right eye    Vitreous floaters of right eye      Past Surgical History:   Procedure Laterality Date    Appendectomy      Bevacizumab, 10mg Bilateral     Avastin injection 21 OU by Dr. Magdaleno     Cataract extraction w/  intraocular lens implant Right 14    RJM    Cataract extraction w/  intraocular lens implant Left 5/3/10    RJM    Cholecystectomy      Colonoscopy      Per NG: adenoma    Knee replacement surgery Left approx.     Other surgical history Right     Per NG: Post op CME -- IVKcryo kidney tumor    Vitrectomy,mechanical Left 9/1/10    for macular pucker with CME with Dr. Magdaleno    Vitrectomy,mechanical Right 16    Dr. Magdaleno    Vitrectomy,mechanical Left 1997     Macular Puckering with CME      Family History   Problem Relation Age of Onset    Diabetes Neg     Glaucoma Neg     Macular degeneration Neg       Social History     Socioeconomic History    Marital status:    Tobacco Use    Smoking status: Former     Current packs/day: 0.00     Types: Cigarettes     Quit date: 1960     Years since quittin.2    Smokeless tobacco: Never   Vaping Use    Vaping status: Never Used   Substance and Sexual Activity    Alcohol use: No     Alcohol/week: 0.0 standard drinks of alcohol    Drug use: No   Other Topics Concern    Caffeine Concern Yes     Comment: 1-2 cups coffee per day    Exercise Yes     Comment: Walks to the store, 1 mile, twice per week    Pt has a pacemaker No    Pt has a defibrillator No    Reaction to local anesthetic No           REVIEW OF SYSTEMS:     Denies nause, fever, chills  No calf pain  Denies chest pain or SOB      EXAM:   There were no vitals taken for this visit.  GENERAL: well developed, well nourished, in no apparent distress  EXTREMITIES:   1. Integument: Normal skin temperature and turgor. Toenails x10 are  elongated, thickened and discolored with subungal derbi.     2. Vascular: Dorsalis pedis two out of four bilateral and posterior tibial pulses two out of   four bilateral, capillary refill normal.   3. Musculoskeletal: All muscle groups are graded 5 out of 5 in the foot and ankle.   4. Neurological: Normal sharp dull sensation; reflexes normal.             ASSESSMENT AND PLAN:   Diagnoses and all orders for this visit:    Pain in both feet    Onychomycosis          Plan:       -Patient examined, chart history reviewed.  -Discussed importance of proper pedal hygiene and regular foot checks  -Sharply debrided nails x10 with a sterile nail nipper achieving a 20% reduction in thickness and length, without incident.   -Use vicks vapor rub to toenails once daily.  -Ambulate with supportive shoes and inserts and avoid walking barefoot.  -Educated patient on acute signs of infection advised patient to seek immediate medical attention if symptoms arise.    RTC in 3 months       The patient indicates understanding of these issues and agrees to the plan.        Alicia Chin DPM

## 2024-04-11 NOTE — PROGRESS NOTES
Injury 1: Left leg avulsion flap, sutured, seen 13 days post injury  - Date: 02/23/24  - Days Since: 48    Washing with soap and water, silvadene, gauze and spandage daily    40 days postinjury  No complaints.  No pain.    2.5 x 2.0 cm granulating wound with peripheral epithelialization  Central loose soft noninfected 1 cm eschar    Eschar excised    Continue to wash, Silvadene, gauze, Spandage    1 month      +++++++++++++++++++++++++++++++++++++++++      MEDICAL DECISION MAKING    PROBLEMS      MODERATE    (number / complexity)          Acute complicated injury    DATA         STRAIGHTFORWARD    (amount / complexity)           MANAGEMENT RISK  MODERATE    (complications/ morbidity)       [place]                  [facility] LEVEL    MODERATE

## 2024-05-09 ENCOUNTER — OFFICE VISIT (OUTPATIENT)
Dept: SURGERY | Facility: CLINIC | Age: 89
End: 2024-05-09

## 2024-05-09 DIAGNOSIS — S81.802A UNSPECIFIED OPEN WOUND, LEFT LOWER LEG, INITIAL ENCOUNTER: Primary | ICD-10-CM

## 2024-05-09 PROCEDURE — 99214 OFFICE O/P EST MOD 30 MIN: CPT | Performed by: PLASTIC SURGERY

## 2024-05-09 NOTE — PROGRESS NOTES
Injury 1: Left leg avulsion flap, sutured, seen 13 days post injury  - Date: 02/23/24  - Days Since: 76    Pt washing with soap and water, silvadene, gauze, and spandage    76 days postinjury  No complaints.  No pain.    Less than 1 cm granulation  Rest of the wound is epithelialized    Silver nitrate    Silvadene daily    When this is epithelialized, in 1 to 2 weeks,  Start Eucerin moisturizers and massage  \"After Skin Surgery\" pamphlet dispensed.    Discharged.  To call if any problems or concerns.      +++++++++++++++++++++++++++++++++++++++++      MEDICAL DECISION MAKING    PROBLEMS      MODERATE    (number / complexity)          Acute complicated injury    DATA         STRAIGHTFORWARD    (amount / complexity)              MANAGEMENT RISK  MODERATE    (complications/ morbidity)       Silvadene                  [facility] LEVEL    MODERATE

## 2024-05-09 NOTE — PROGRESS NOTES
Per Dr Gregory pt instructed to continue washing daily then silvadene,guaze and spandage till healed 1-2 weeks then start Eucerin massage.  May send photos with any questions.  Per verbal  order from Dr Gregory, pt  given verbal instructions with demonstration of Eucerin massage to incision of L leg.  Given \"After Skin Surgery\" Pamphlet.  Discussed sun exposure, sun block usage and scars sensitivity to the sun.  Questions answered.  Verbalized understanding.  Instructed to call the office with s/s of infection, any further questions and/ or concerns.

## 2024-05-31 ENCOUNTER — LAB ENCOUNTER (OUTPATIENT)
Dept: LAB | Facility: HOSPITAL | Age: 89
End: 2024-05-31
Attending: FAMILY MEDICINE
Payer: MEDICARE

## 2024-05-31 DIAGNOSIS — E03.9 HYPOTHYROIDISM, UNSPECIFIED TYPE: ICD-10-CM

## 2024-05-31 LAB
ALBUMIN SERPL-MCNC: 4.1 G/DL (ref 3.2–4.8)
ALBUMIN/GLOB SERPL: 1.5 {RATIO} (ref 1–2)
ALP LIVER SERPL-CCNC: 75 U/L
ALT SERPL-CCNC: 15 U/L
ANION GAP SERPL CALC-SCNC: 5 MMOL/L (ref 0–18)
AST SERPL-CCNC: 25 U/L (ref ?–34)
BASOPHILS # BLD AUTO: 0.04 X10(3) UL (ref 0–0.2)
BASOPHILS NFR BLD AUTO: 0.5 %
BILIRUB SERPL-MCNC: 0.4 MG/DL (ref 0.2–0.9)
BUN BLD-MCNC: 21 MG/DL (ref 9–23)
BUN/CREAT SERPL: 15.7 (ref 10–20)
CALCIUM BLD-MCNC: 9.1 MG/DL (ref 8.7–10.4)
CHLORIDE SERPL-SCNC: 109 MMOL/L (ref 98–112)
CO2 SERPL-SCNC: 29 MMOL/L (ref 21–32)
CREAT BLD-MCNC: 1.34 MG/DL
DEPRECATED RDW RBC AUTO: 40.5 FL (ref 35.1–46.3)
EGFRCR SERPLBLD CKD-EPI 2021: 49 ML/MIN/1.73M2 (ref 60–?)
EOSINOPHIL # BLD AUTO: 0.16 X10(3) UL (ref 0–0.7)
EOSINOPHIL NFR BLD AUTO: 2.1 %
ERYTHROCYTE [DISTWIDTH] IN BLOOD BY AUTOMATED COUNT: 12.7 % (ref 11–15)
FASTING STATUS PATIENT QL REPORTED: NO
GLOBULIN PLAS-MCNC: 2.7 G/DL (ref 2–3.5)
GLUCOSE BLD-MCNC: 100 MG/DL (ref 70–99)
HCT VFR BLD AUTO: 44.2 %
HGB BLD-MCNC: 15.6 G/DL
IMM GRANULOCYTES # BLD AUTO: 0.03 X10(3) UL (ref 0–1)
IMM GRANULOCYTES NFR BLD: 0.4 %
LYMPHOCYTES # BLD AUTO: 1.81 X10(3) UL (ref 1–4)
LYMPHOCYTES NFR BLD AUTO: 23.4 %
MCH RBC QN AUTO: 31 PG (ref 26–34)
MCHC RBC AUTO-ENTMCNC: 35.3 G/DL (ref 31–37)
MCV RBC AUTO: 87.7 FL
MONOCYTES # BLD AUTO: 0.87 X10(3) UL (ref 0.1–1)
MONOCYTES NFR BLD AUTO: 11.3 %
NEUTROPHILS # BLD AUTO: 4.82 X10 (3) UL (ref 1.5–7.7)
NEUTROPHILS # BLD AUTO: 4.82 X10(3) UL (ref 1.5–7.7)
NEUTROPHILS NFR BLD AUTO: 62.3 %
OSMOLALITY SERPL CALC.SUM OF ELEC: 299 MOSM/KG (ref 275–295)
PLATELET # BLD AUTO: 225 10(3)UL (ref 150–450)
POTASSIUM SERPL-SCNC: 3.9 MMOL/L (ref 3.5–5.1)
PROT SERPL-MCNC: 6.8 G/DL (ref 5.7–8.2)
RBC # BLD AUTO: 5.04 X10(6)UL
SODIUM SERPL-SCNC: 143 MMOL/L (ref 136–145)
TSI SER-ACNC: 3.34 MIU/ML (ref 0.55–4.78)
WBC # BLD AUTO: 7.7 X10(3) UL (ref 4–11)

## 2024-05-31 PROCEDURE — 85025 COMPLETE CBC W/AUTO DIFF WBC: CPT

## 2024-05-31 PROCEDURE — 80053 COMPREHEN METABOLIC PANEL: CPT

## 2024-05-31 PROCEDURE — 36415 COLL VENOUS BLD VENIPUNCTURE: CPT

## 2024-05-31 PROCEDURE — 84443 ASSAY THYROID STIM HORMONE: CPT

## 2024-06-14 ENCOUNTER — OFFICE VISIT (OUTPATIENT)
Dept: FAMILY MEDICINE CLINIC | Facility: CLINIC | Age: 89
End: 2024-06-14
Payer: MEDICARE

## 2024-06-14 VITALS
WEIGHT: 230.38 LBS | OXYGEN SATURATION: 94 % | RESPIRATION RATE: 14 BRPM | DIASTOLIC BLOOD PRESSURE: 74 MMHG | SYSTOLIC BLOOD PRESSURE: 170 MMHG | HEIGHT: 69 IN | HEART RATE: 53 BPM | BODY MASS INDEX: 34.12 KG/M2

## 2024-06-14 DIAGNOSIS — N28.9 RENAL INSUFFICIENCY: Primary | ICD-10-CM

## 2024-06-14 PROCEDURE — 99213 OFFICE O/P EST LOW 20 MIN: CPT | Performed by: FAMILY MEDICINE

## 2024-06-14 PROCEDURE — G2211 COMPLEX E/M VISIT ADD ON: HCPCS | Performed by: FAMILY MEDICINE

## 2024-06-14 NOTE — PROGRESS NOTES
Subjective:   Patient ID: Casa Lozano is a 89 year old male.    HPI  Patient for f/u hypertension   Denies any chest pain shortness of breath or headaches.   Monitoring blood pressure at home and is below 150/80    History/Other:   Review of Systems    Constitutional: Negative.  Negative for activity change, appetite change, diaphoresis and fatigue.     Respiratory: Negative.  Negative for apnea, cough, chest tightness and shortness of breath.    Cardiovascular: Negative.  Negative for chest pain, palpitations and leg swelling.   Gastrointestinal: Negative.  Negative for abdominal pain.     Current Outpatient Medications   Medication Sig Dispense Refill    divalproex  MG Oral Tablet 24 Hr Take 1 tablet (500 mg total) by mouth daily. 90 tablet 3    levetiracetam 750 MG Oral Tab Take 1 tablet (750 mg total) by mouth 2 (two) times daily. 180 tablet 3    lisinopril 10 MG Oral Tab Take 1 tablet (10 mg total) by mouth 2 (two) times daily. 180 tablet 3    levothyroxine 50 MCG Oral Tab Take 1 tablet (50 mcg total) by mouth before breakfast. 90 tablet 3    metoprolol succinate ER 50 MG Oral Tablet 24 Hr Take 1 tablet (50 mg total) by mouth daily. 90 tablet 3    amLODIPine 5 MG Oral Tab Take 1 tablet (5 mg total) by mouth daily. 90 tablet 0    cholecalciferol 50 MCG (2000 UT) Oral Tab Take 1 tablet (2,000 Units total) by mouth daily. 90 tablet 3    Magnesium 100 MG Oral Tab Take by mouth.      aspirin 81 MG Oral Tab EC Take 1 tablet (81 mg total) by mouth daily.       Allergies:No Known Allergies    Objective:   Physical Exam  Constitutional:       Appearance: He is well-developed.   Cardiovascular:      Rate and Rhythm: Normal rate and regular rhythm.      Heart sounds: Normal heart sounds.   Pulmonary:      Effort: Pulmonary effort is normal.      Breath sounds: Normal breath sounds.   Neurological:      Mental Status: He is alert.      Deep Tendon Reflexes: Reflexes are normal and symmetric.         Assessment &  Plan:   1. Renal insufficiency    Cpm   F/u in few months   Avoid nsaids   2. Hypertension   cpm    Orders Placed This Encounter   Procedures    Basic Metabolic Panel (8)       Meds This Visit:  Requested Prescriptions      No prescriptions requested or ordered in this encounter       Imaging & Referrals:  None

## 2024-07-02 ENCOUNTER — OFFICE VISIT (OUTPATIENT)
Dept: PODIATRY CLINIC | Facility: CLINIC | Age: 89
End: 2024-07-02

## 2024-07-02 DIAGNOSIS — B35.1 ONYCHOMYCOSIS: ICD-10-CM

## 2024-07-02 DIAGNOSIS — M79.672 PAIN IN BOTH FEET: Primary | ICD-10-CM

## 2024-07-02 DIAGNOSIS — M79.671 PAIN IN BOTH FEET: Primary | ICD-10-CM

## 2024-07-02 DIAGNOSIS — R26.81 GAIT INSTABILITY: ICD-10-CM

## 2024-07-02 PROCEDURE — 99213 OFFICE O/P EST LOW 20 MIN: CPT | Performed by: STUDENT IN AN ORGANIZED HEALTH CARE EDUCATION/TRAINING PROGRAM

## 2024-07-02 NOTE — PROGRESS NOTES
[facility] Podiatry  Progress Note      Casa Lozano is a 89 year old male.   Chief Complaint   Patient presents with    Toenail Care     3MO f/u - Pt here for nail trim and foot check. No pain or concerns.              HPI:     Patient presents to clinic today for toenail care to bilateral feet.  He is accompanied by his daughter.    Allergies: Patient has no known allergies.    Current Outpatient Medications   Medication Sig Dispense Refill    divalproex  MG Oral Tablet 24 Hr Take 1 tablet (500 mg total) by mouth daily. 90 tablet 3    levetiracetam 750 MG Oral Tab Take 1 tablet (750 mg total) by mouth 2 (two) times daily. 180 tablet 3    lisinopril 10 MG Oral Tab Take 1 tablet (10 mg total) by mouth 2 (two) times daily. 180 tablet 3    levothyroxine 50 MCG Oral Tab Take 1 tablet (50 mcg total) by mouth before breakfast. 90 tablet 3    metoprolol succinate ER 50 MG Oral Tablet 24 Hr Take 1 tablet (50 mg total) by mouth daily. 90 tablet 3    amLODIPine 5 MG Oral Tab Take 1 tablet (5 mg total) by mouth daily. 90 tablet 0    cholecalciferol 50 MCG (2000 UT) Oral Tab Take 1 tablet (2,000 Units total) by mouth daily. 90 tablet 3    Magnesium 100 MG Oral Tab Take by mouth.      aspirin 81 MG Oral Tab EC Take 1 tablet (81 mg total) by mouth daily.        Past Medical History:    Arthritis    Cancer (HCC)    CME (cystoid macular edema)    IVK injections by Dr. Nava    Dermatochalasis of eyelid    Dermatochalasis of eyelid    H/O prostate biopsy    Hearing impairment    High cholesterol    History of vitrectomy    History of vitrectomy    Hyperlipidemia    Macular degeneration    Prostate cancer (HCC)    Pseudophakia of both eyes    Pseudophakia of both eyes    RPE mottling of macula left eye    Seizure disorder (HCC)    TIA (transient ischemic attack)    Unspecified essential hypertension    Vitreous floaters of right eye    Vitreous floaters of right eye      Past Surgical History:   Procedure Laterality  Date    Appendectomy      Bevacizumab, 10mg Bilateral     Avastin injection 21 OU by Dr. Magdaleno     Cataract extraction w/  intraocular lens implant Right 14    RJM    Cataract extraction w/  intraocular lens implant Left 5/3/10    RJM    Cholecystectomy      Colonoscopy      Per NG: adenoma    Knee replacement surgery Left approx.     Other surgical history Right     Per NG: Post op CME -- IVKcryo kidney tumor    Vitrectomy,mechanical Left 9/1/10    for macular pucker with CME with Dr. Magdaleno    Vitrectomy,mechanical Right 16    Dr. Magdaleno    Vitrectomy,mechanical Left 1997     Macular Puckering with CME      Family History   Problem Relation Age of Onset    Diabetes Neg     Glaucoma Neg     Macular degeneration Neg       Social History     Socioeconomic History    Marital status:    Tobacco Use    Smoking status: Former     Current packs/day: 0.00     Types: Cigarettes     Quit date: 1960     Years since quittin.4    Smokeless tobacco: Never   Vaping Use    Vaping status: Never Used   Substance and Sexual Activity    Alcohol use: No     Alcohol/week: 0.0 standard drinks of alcohol    Drug use: No   Other Topics Concern    Caffeine Concern Yes     Comment: 1-2 cups coffee per day    Exercise Yes     Comment: Walks to the store, 1 mile, twice per week    Pt has a pacemaker No    Pt has a defibrillator No    Reaction to local anesthetic No           REVIEW OF SYSTEMS:     Denies nause, fever, chills  No calf pain  Denies chest pain or SOB      EXAM:   There were no vitals taken for this visit.  GENERAL: well developed, well nourished, in no apparent distress  EXTREMITIES:   1. Integument: Normal skin temperature and turgor. Toenails x10 are elongated, thickened and discolored with subungal derbi.     2. Vascular: Dorsalis pedis two out of four bilateral and posterior tibial pulses two out of   four bilateral, capillary refill normal.   3. Musculoskeletal:  All muscle groups are graded 5 out of 5 in the foot and ankle.   4. Neurological: Normal sharp dull sensation; reflexes normal.             ASSESSMENT AND PLAN:   Diagnoses and all orders for this visit:    Pain in both feet    Onychomycosis    Gait instability            Plan:       -Patient examined, chart history reviewed.  -Discussed importance of proper pedal hygiene and regular foot checks  -Sharply debrided nails x10 with a sterile nail nipper achieving a 20% reduction in thickness and length, without incident.   -Ambulate with supportive shoes and inserts and avoid walking barefoot.  -Educated patient on acute signs of infection advised patient to seek immediate medical attention if symptoms arise.    RTC in 3 months       The patient indicates understanding of these issues and agrees to the plan.        Alicia Chin DPM

## 2024-09-05 ENCOUNTER — OFFICE VISIT (OUTPATIENT)
Dept: PODIATRY CLINIC | Facility: CLINIC | Age: 89
End: 2024-09-05

## 2024-09-05 DIAGNOSIS — M79.672 PAIN IN BOTH FEET: Primary | ICD-10-CM

## 2024-09-05 DIAGNOSIS — M79.671 PAIN IN BOTH FEET: Primary | ICD-10-CM

## 2024-09-05 DIAGNOSIS — R26.81 GAIT INSTABILITY: ICD-10-CM

## 2024-09-05 DIAGNOSIS — B35.1 ONYCHOMYCOSIS: ICD-10-CM

## 2024-09-05 PROCEDURE — 99213 OFFICE O/P EST LOW 20 MIN: CPT | Performed by: STUDENT IN AN ORGANIZED HEALTH CARE EDUCATION/TRAINING PROGRAM

## 2024-09-05 NOTE — PROGRESS NOTES
[facility] Podiatry  Progress Note      Casa Lozano is a 89 year old male.   Chief Complaint   Patient presents with    Toenail Care     2 mo f/u - has sometimes pain in his feet rated as 3/10 on and off - here for toenail trim              HPI:     Patient presents to clinic today for toenail care to bilateral feet.  He is accompanied by his daughter.    Allergies: Patient has no known allergies.    Current Outpatient Medications   Medication Sig Dispense Refill    divalproex  MG Oral Tablet 24 Hr Take 1 tablet (500 mg total) by mouth daily. 90 tablet 3    levetiracetam 750 MG Oral Tab Take 1 tablet (750 mg total) by mouth 2 (two) times daily. 180 tablet 3    lisinopril 10 MG Oral Tab Take 1 tablet (10 mg total) by mouth 2 (two) times daily. 180 tablet 3    levothyroxine 50 MCG Oral Tab Take 1 tablet (50 mcg total) by mouth before breakfast. 90 tablet 3    metoprolol succinate ER 50 MG Oral Tablet 24 Hr Take 1 tablet (50 mg total) by mouth daily. 90 tablet 3    amLODIPine 5 MG Oral Tab Take 1 tablet (5 mg total) by mouth daily. 90 tablet 0    cholecalciferol 50 MCG (2000 UT) Oral Tab Take 1 tablet (2,000 Units total) by mouth daily. 90 tablet 3    Magnesium 100 MG Oral Tab Take by mouth.      aspirin 81 MG Oral Tab EC Take 1 tablet (81 mg total) by mouth daily.        Past Medical History:    Arthritis    Cancer (HCC)    CME (cystoid macular edema)    IVK injections by Dr. Nava    Dermatochalasis of eyelid    Dermatochalasis of eyelid    H/O prostate biopsy    Hearing impairment    High cholesterol    History of vitrectomy    History of vitrectomy    Hyperlipidemia    Macular degeneration    Prostate cancer (HCC)    Pseudophakia of both eyes    Pseudophakia of both eyes    RPE mottling of macula left eye    Seizure disorder (HCC)    TIA (transient ischemic attack)    Unspecified essential hypertension    Vitreous floaters of right eye    Vitreous floaters of right eye      Past Surgical History:    Procedure Laterality Date    Appendectomy      Bevacizumab, 10mg Bilateral     Avastin injection 21 OU by Dr. Magdaleno     Cataract extraction w/  intraocular lens implant Right 14    RJM    Cataract extraction w/  intraocular lens implant Left 5/3/10    RJM    Cholecystectomy      Colonoscopy      Per NG: adenoma    Knee replacement surgery Left approx.     Other surgical history Right     Per NG: Post op CME -- IVKcryo kidney tumor    Vitrectomy,mechanical Left 9/1/10    for macular pucker with CME with Dr. Magdaleno    Vitrectomy,mechanical Right 16    Dr. Magdaleno    Vitrectomy,mechanical Left 1997     Macular Puckering with CME      Family History   Problem Relation Age of Onset    Diabetes Neg     Glaucoma Neg     Macular degeneration Neg       Social History     Socioeconomic History    Marital status:    Tobacco Use    Smoking status: Former     Current packs/day: 0.00     Types: Cigarettes     Quit date: 1960     Years since quittin.6    Smokeless tobacco: Never   Vaping Use    Vaping status: Never Used   Substance and Sexual Activity    Alcohol use: No     Alcohol/week: 0.0 standard drinks of alcohol    Drug use: No   Other Topics Concern    Caffeine Concern Yes     Comment: 1-2 cups coffee per day    Exercise Yes     Comment: Walks to the store, 1 mile, twice per week    Pt has a pacemaker No    Pt has a defibrillator No    Reaction to local anesthetic No           REVIEW OF SYSTEMS:     Denies nause, fever, chills  No calf pain  Denies chest pain or SOB      EXAM:   There were no vitals taken for this visit.  GENERAL: well developed, well nourished, in no apparent distress  EXTREMITIES:   1. Integument: Normal skin temperature and turgor. Toenails x10 are elongated, thickened and discolored with subungal derbi.     2. Vascular: Dorsalis pedis two out of four bilateral and posterior tibial pulses two out of   four bilateral, capillary refill  normal.   3. Musculoskeletal: All muscle groups are graded 5 out of 5 in the foot and ankle.   4. Neurological: Normal sharp dull sensation; reflexes normal.             ASSESSMENT AND PLAN:   Diagnoses and all orders for this visit:    Pain in both feet    Onychomycosis    Gait instability              Plan:       -Patient examined, chart history reviewed.  -Discussed importance of proper pedal hygiene and regular foot checks  -Sharply debrided nails x10 with a sterile nail nipper achieving a 20% reduction in thickness and length, without incident.   -Ambulate with supportive shoes and inserts and avoid walking barefoot.  -Educated patient on acute signs of infection advised patient to seek immediate medical attention if symptoms arise.    RTC in 3 months       The patient indicates understanding of these issues and agrees to the plan.        Alicia Chin DPM

## 2024-09-13 ENCOUNTER — HOSPITAL ENCOUNTER (OUTPATIENT)
Dept: MRI IMAGING | Age: 89
Discharge: HOME | End: 2024-09-13
Attending: RADIOLOGY
Payer: MEDICARE

## 2024-09-13 DIAGNOSIS — C64.9 RENAL CARCINOMA (HCC): ICD-10-CM

## 2024-09-13 PROCEDURE — A9575 INJ GADOTERATE MEGLUMI 0.1ML: HCPCS | Performed by: RADIOLOGY

## 2024-09-13 PROCEDURE — 74183 MRI ABD W/O CNTR FLWD CNTR: CPT | Performed by: RADIOLOGY

## 2024-09-13 RX ORDER — GADOTERATE MEGLUMINE 376.9 MG/ML
20 INJECTION INTRAVENOUS
Status: COMPLETED | OUTPATIENT
Start: 2024-09-13 | End: 2024-09-13

## 2024-09-13 RX ADMIN — GADOTERATE MEGLUMINE 20 ML: 376.9 INJECTION INTRAVENOUS at 11:06:00

## 2024-10-09 NOTE — TELEPHONE ENCOUNTER
Please review. Protocol Failed; No Protocol    Requested Prescriptions   Pending Prescriptions Disp Refills    METOPROLOL SUCCINATE ER 50 MG Oral Tablet 24 Hr [Pharmacy Med Name: Metoprolol Succinate Er 24hr 50 Mg Tab Nort] 90 tablet 0     Sig: Take 1 tablet (50 mg total) by mouth daily.       Hypertension Medications Protocol Failed - 10/9/2024  5:42 PM        Failed - Last BP reading less than 140/90     BP Readings from Last 1 Encounters:   06/14/24 (!) 170/74               Failed - EGFRCR or GFRNAA > 50     GFR Evaluation  EGFRCR: 49 , resulted on 5/31/2024          Passed - CMP or BMP in past 12 months        Passed - In person appointment or virtual visit in the past 12 mos or appointment in next 3 mos     Recent Outpatient Visits              [facility]    Office Visit    3 months ago [facility] both [facility]    Office Visit    3 months ago Renal insufficiency    [facility] [place], Khushboo Auguste MD    Office Visit    5 months ago Unspecified open wound, left lower leg, initial encounter    [facility]    Office Visit    6 months ago Unspecified open wound, left lower leg, initial encounter    [facility]    [facility] Visit          Future Appointments         Provider Department Appt Notes    [facility]                           Future Appointments         Provider Department Appt Notes    In 2 months [facility]          Recent Outpatient Visits              1 month ago Pain in both feet     [facility], [facility], Alicia Waterman DPM    Office Visit    3 months ago Pain in both feet    [facility], Alicia Waterman DPM    Office Visit    3 months ago Renal insufficiency    [facility], [facility], Khushboo Auguste MD    Office Visit    5 months ago Unspecified open wound, left lower leg, initial encounter    [facility], Guillaume Vazquez MD    Office Visit    6 months ago Unspecified open wound, left lower leg, initial encounter    [facility], Guillaume Vazquez MD    Office Visit

## 2024-10-10 RX ORDER — METOPROLOL SUCCINATE 50 MG/1
50 TABLET, EXTENDED RELEASE ORAL DAILY
Qty: 90 TABLET | Refills: 3 | Status: SHIPPED | OUTPATIENT
Start: 2024-10-10

## 2024-10-11 RX ORDER — METOPROLOL SUCCINATE 50 MG/1
50 TABLET, EXTENDED RELEASE ORAL DAILY
Qty: 90 TABLET | Refills: 3 | OUTPATIENT
Start: 2024-10-11

## 2024-10-11 NOTE — TELEPHONE ENCOUNTER
Order  metoprolol succinate ER 50 MG Oral Tablet 24 Hr [[id number]] (Order [id number])  Patient Information    Patient Name  Casa Lozano MRN  [id number] Legal Sex  Male   1929     Order Information    Ordered Status Priority Ordering User Department   10/10/24 Sent (none) Khushboo Anne MD [facility]     Order History  Outpatient  Date/Time Action Taken User Additional Information   10/08/24 0945 Pend [place]    10/09/24 1742 Pend Hazel Foster CPhT    10/10/24 1522 Sign Khushboo Anne MD Reorder from Order:[id number]   10/10/24 152 Taking Flag Checked Khushboo Anne MD [id number]     Provider Information    Authorizing Provider Encounter Provider   Khushboo Anne MD Boskov, Tanja, MD     Medication Detail    Medication Quantity Refills Start End   metoprolol succinate ER 50 MG Oral Tablet 24 Hr 90 tablet 3 10/10/2024 --   Sig:   Take 1 tablet (50 mg total) by mouth daily.     Route:   Oral     Order #:   [id number]       Additional Order Information    Multidose Package Dispensed: No Cost ID: -- Admin Qty: 50 mg    NDC #: -- NDC Qty: --    Calculation: --     Pharmacy Actions and Admin    [facility] Pharmacy Actions     MAR Comment Waste Waste Unit          Outpatient Medication Detail     Disp Refills Start End    metoprolol succinate ER 50 MG Oral Tablet 24 Hr 90 tablet 3 10/10/2024 --    Sig - Route: Take 1 tablet (50 mg total) by mouth daily. - Oral    Sent to pharmacy as: Metoprolol Succinate ER 50 MG Oral Tablet Extended Release 24 Hour (Toprol XL)    E-Prescribing Status: Receipt confirmed by pharmacy (10/10/2024  3:22 PM CDT)      Pharmacy    [id number] - [place], [place] - [address] [phone], [phone]     Additional Information    Associated Reports   View Encounter   Priority and Order Details     Outpatient Medication Detail     Disp Refills Start End    metoprolol succinate ER 50 MG Oral Tablet 24 Hr 90 tablet 3 10/10/2024 --    Sig - Route: Take 1  tablet (50 mg total) by mouth daily. - Oral    Sent to pharmacy as: Metoprolol Succinate ER 50 MG Oral Tablet Extended Release 24 Hour (Toprol XL)    E-Prescribing Status: Receipt confirmed by pharmacy (10/10/2024  3:22 PM CDT)      Print Trail    Printed On Printed By Printed To Report   10/10/24  3:22 PM Khushboo Anne MD [facility] Generic Report     Inpatient Medication Audit    Click to see inpatient medication information

## 2024-10-12 ENCOUNTER — LAB ENCOUNTER (OUTPATIENT)
Dept: LAB | Facility: HOSPITAL | Age: 89
End: 2024-10-12
Attending: FAMILY MEDICINE
Payer: MEDICARE

## 2024-10-12 DIAGNOSIS — N28.9 RENAL INSUFFICIENCY: ICD-10-CM

## 2024-10-12 LAB
ANION GAP SERPL CALC-SCNC: 5 MMOL/L (ref 0–18)
BUN BLD-MCNC: 22 MG/DL (ref 9–23)
BUN/CREAT SERPL: 15.8 (ref 10–20)
CALCIUM BLD-MCNC: 9.1 MG/DL (ref 8.7–10.4)
CHLORIDE SERPL-SCNC: 112 MMOL/L (ref 98–112)
CO2 SERPL-SCNC: 28 MMOL/L (ref 21–32)
CREAT BLD-MCNC: 1.39 MG/DL
EGFRCR SERPLBLD CKD-EPI 2021: 47 ML/MIN/1.73M2 (ref 60–?)
FASTING STATUS PATIENT QL REPORTED: NO
GLUCOSE BLD-MCNC: 141 MG/DL (ref 70–99)
OSMOLALITY SERPL CALC.SUM OF ELEC: 306 MOSM/KG (ref 275–295)
POTASSIUM SERPL-SCNC: 3.8 MMOL/L (ref 3.5–5.1)
SODIUM SERPL-SCNC: 145 MMOL/L (ref 136–145)

## 2024-10-12 PROCEDURE — 36415 COLL VENOUS BLD VENIPUNCTURE: CPT

## 2024-10-12 PROCEDURE — 80048 BASIC METABOLIC PNL TOTAL CA: CPT

## 2024-12-05 RX ORDER — METOPROLOL SUCCINATE 50 MG/1
50 TABLET, EXTENDED RELEASE ORAL DAILY
Qty: 90 TABLET | Refills: 0 | Status: SHIPPED | OUTPATIENT
Start: 2024-12-05

## 2024-12-05 NOTE — TELEPHONE ENCOUNTER
Please review; protocol failed/ has no protocol    Requested Prescriptions   Pending Prescriptions Disp Refills    metoprolol succinate ER 50 MG Oral Tablet 24 Hr 90 tablet 3     Sig: Take 1 tablet (50 mg total) by mouth daily.       Hypertension Medications Protocol Failed - 12/5/2024 12:22 PM        Failed - Last BP reading less than 140/90     BP Readings from Last 1 Encounters:   06/14/24 (!) 170/74               Failed - EGFRCR or GFRNAA > 50     GFR Evaluation  EGFRCR: 47 , resulted on 10/12/2024          Passed - CMP or BMP in past 12 months        Passed - In person appointment or virtual visit in the past 12 mos or appointment in next 3 mos     Recent Outpatient Visits              3 months ago [facility]    Office Visit    5 months ago Pain in both feet    [facility] Alicia Waterman DPM    Office Visit    5 months ago Renal insufficiency    [facility]    Office Visit    7 months ago Unspecified open wound, left lower leg, [facility]    [facility]    7 months ago Unspecified open wound, left lower leg, initial [facility]    [facility]          Future Appointments         Provider Department Appt Notes    In 5 days [facility]o Anne MD [facility].    In 1 month Alicia Chin DPM [facility] f/u appt                       Recent Outpatient Visits              3 months ago Pain in both feet    [facility] Alicia Waterman DPM    Office Visit    5 months ago Pain in both feet     [facility] [place] Alicia Chin DPM    Office Visit    5 months ago Renal insufficiency    [facility] Khushboo Anne MD    Office Visit    7 months ago Unspecified open wound, left lower leg, initial encounter    [facility] [place]    Office Visit    7 months ago Unspecified open wound, left lower leg, initial encounter    [facility] [place]    Office Visit          Future Appointments         Provider Department Appt Notes    In 5 days Khushboo Anne MD [facility] Blood pressure.    In 1 month Alicia Chin DPM [facility] f/u appt

## 2024-12-10 ENCOUNTER — OFFICE VISIT (OUTPATIENT)
Dept: FAMILY MEDICINE CLINIC | Facility: CLINIC | Age: 89
End: 2024-12-10
Payer: MEDICARE

## 2024-12-10 VITALS
SYSTOLIC BLOOD PRESSURE: 152 MMHG | TEMPERATURE: 97 F | DIASTOLIC BLOOD PRESSURE: 60 MMHG | BODY MASS INDEX: 33.62 KG/M2 | HEART RATE: 51 BPM | RESPIRATION RATE: 20 BRPM | WEIGHT: 227 LBS | OXYGEN SATURATION: 97 % | HEIGHT: 69 IN

## 2024-12-10 DIAGNOSIS — I10 ESSENTIAL HYPERTENSION: ICD-10-CM

## 2024-12-10 DIAGNOSIS — N28.9 RENAL INSUFFICIENCY: Primary | ICD-10-CM

## 2024-12-10 PROCEDURE — 99214 OFFICE O/P EST MOD 30 MIN: CPT | Performed by: FAMILY MEDICINE

## 2024-12-10 NOTE — PROGRESS NOTES
Subjective:   Patient ID: Casa Lozano is a 89 year old male.    HPI  Patient for f/u hypertension   Denies any chest pain shortness of breath or headaches.   Monitoring blood pressure at home usually good   History/Other:   Review of Systems    Constitutional: Negative.  Negative for activity change, appetite change, diaphoresis and fatigue.     Respiratory: Negative.  Negative for apnea, cough, chest tightness and shortness of breath.    Cardiovascular: Negative.  Negative for chest pain, palpitations and leg swelling.   Gastrointestinal: Negative.  Negative for abdominal pain.   Skin: Negative.           Psychiatric/Behavioral: Negative.        Current Outpatient Medications   Medication Sig Dispense Refill    metoprolol succinate ER 50 MG Oral Tablet 24 Hr Take 1 tablet (50 mg total) by mouth daily. 90 tablet 0    divalproex  MG Oral Tablet 24 Hr Take 1 tablet (500 mg total) by mouth daily. 90 tablet 3    levetiracetam 750 MG Oral Tab Take 1 tablet (750 mg total) by mouth 2 (two) times daily. 180 tablet 3    lisinopril 10 MG Oral Tab Take 1 tablet (10 mg total) by mouth 2 (two) times daily. 180 tablet 3    levothyroxine 50 MCG Oral Tab Take 1 tablet (50 mcg total) by mouth before breakfast. 90 tablet 3    amLODIPine 5 MG Oral Tab Take 1 tablet (5 mg total) by mouth daily. 90 tablet 0    cholecalciferol 50 MCG (2000 UT) Oral Tab Take 1 tablet (2,000 Units total) by mouth daily. 90 tablet 3    Magnesium 100 MG Oral Tab Take by mouth.      aspirin 81 MG Oral Tab EC Take 1 tablet (81 mg total) by mouth daily.       Allergies:Allergies[1]    Objective:   Physical Exam  Cardiovascular:      Rate and Rhythm: Normal rate and regular rhythm.      Pulses: Normal pulses.      Heart sounds: Normal heart sounds.   Pulmonary:      Effort: Pulmonary effort is normal.      Breath sounds: Normal breath sounds.   Musculoskeletal:      Cervical back: Normal range of motion.   Neurological:      Mental Status: He is alert.          Assessment & Plan:   1. Renal insufficiency    Repeat test in few months   Avoid nsaids   2. Hypertension controlled cpm    Orders Placed This Encounter   Procedures    Basic Metabolic Panel (8) [E]       Meds This Visit:  Requested Prescriptions      No prescriptions requested or ordered in this encounter       Imaging & Referrals:  None         [1] No Known Allergies

## 2024-12-19 RX ORDER — LEVOTHYROXINE SODIUM 50 UG/1
50 TABLET ORAL
Qty: 90 TABLET | Refills: 3 | OUTPATIENT
Start: 2024-12-19

## 2024-12-19 RX ORDER — LEVOTHYROXINE SODIUM 50 UG/1
50 TABLET ORAL
Qty: 90 TABLET | Refills: 3 | Status: SHIPPED | OUTPATIENT
Start: 2024-12-19

## 2024-12-20 NOTE — TELEPHONE ENCOUNTER
Refill passed per [facility] protocol.    Requested Prescriptions   Pending Prescriptions Disp Refills    LEVOTHYROXINE 50 MCG Oral Tab [Pharmacy Med Name: Levothyroxine Sodium 50 Mcg Tab Lupi] 90 tablet 0     Sig: Take 1 tablet by mouth before breakfast.       Thyroid Medication Protocol Passed - 12/19/2024  8:50 PM        Passed - TSH in past 12 months        Passed - Last TSH value is normal     Lab Results   Component Value Date    TSH 3.336 05/31/2024                 Passed - In person appointment or virtual visit in the past 12 mos or appointment in next 3 mos     Recent Outpatient Visits              [facility] week [facility]    [facility]    3 months ago Pain in both feet    [facility], Alicia Wtaerman DPM    Office Visit    5 months ago Pain in both feet    [facility] Alicia Waterman DPM    Office Visit    6 months ago Renal insufficiency    [facility]    Office Visit    7 months ago Unspecified open wound, left lower leg, initial encounter    [facility] [place]    [place] Visit          Future Appointments         Provider Department Appt Notes    In 2 weeks Alicia Chin DPM [facility], [place] f/u appt                       Future Appointments         Provider Department Appt Notes    In 2 weeks Alicia Chin DPM [facility], [place] f/u appt          Recent Outpatient Visits              1 week ago Renal insufficiency    [facility]    Office Visit    3 months ago [facility] in both feet    [facility]  Alicia Chin D[facility]    Office Visit    5 months ago Pain in both feet    [facility] [place]    Office Visit    6 months ago Renal insufficiency    [facility], Khushboo Auguste MD    Office Visit    7 months ago Unspecified open wound, left lower leg, initial encounter    [facility] [place] Guillaume Ryder MD    Office Visit

## 2025-01-27 ENCOUNTER — OFFICE VISIT (OUTPATIENT)
Dept: PODIATRY CLINIC | Facility: CLINIC | Age: OVER 89
End: 2025-01-27
Payer: MEDICARE

## 2025-01-27 DIAGNOSIS — B35.1 ONYCHOMYCOSIS: Primary | ICD-10-CM

## 2025-01-27 DIAGNOSIS — M79.671 PAIN IN BOTH FEET: ICD-10-CM

## 2025-01-27 DIAGNOSIS — M79.672 PAIN IN BOTH FEET: ICD-10-CM

## 2025-01-27 DIAGNOSIS — L60.3 NAIL DYSTROPHY: ICD-10-CM

## 2025-01-27 DIAGNOSIS — R26.81 GAIT INSTABILITY: ICD-10-CM

## 2025-01-27 PROCEDURE — 99213 OFFICE O/P EST LOW 20 MIN: CPT | Performed by: STUDENT IN AN ORGANIZED HEALTH CARE EDUCATION/TRAINING PROGRAM

## 2025-01-27 NOTE — PROGRESS NOTES
[facility] Podiatry  Progress Note      Casa Lozano is a 89 year old male.   Chief Complaint   Patient presents with    Toenail Care     Here w/ daughter- Nail trim and foot check f/u- LOV w/ PCP Dr. Anne on 12/10/2024- denies pain              HPI:     Patient is a pleasant 89 year old male who presents to clinic today for toenail care to bilateral feet.  He is accompanied by his daughter. Admits to elongated toenails he is unable to trim himself.     Allergies: Patient has no known allergies.    Current Outpatient Medications   Medication Sig Dispense Refill    levothyroxine 50 MCG Oral Tab Take 1 tablet (50 mcg total) by mouth before breakfast. 90 tablet 3    metoprolol succinate ER 50 MG Oral Tablet 24 Hr Take 1 tablet (50 mg total) by mouth daily. 90 tablet 0    divalproex  MG Oral Tablet 24 Hr Take 1 tablet (500 mg total) by mouth daily. 90 tablet 3    levetiracetam 750 MG Oral Tab Take 1 tablet (750 mg total) by mouth 2 (two) times daily. 180 tablet 3    lisinopril 10 MG Oral Tab Take 1 tablet (10 mg total) by mouth 2 (two) times daily. 180 tablet 3    amLODIPine 5 MG Oral Tab Take 1 tablet (5 mg total) by mouth daily. 90 tablet 0    cholecalciferol 50 MCG (2000 UT) Oral Tab Take 1 tablet (2,000 Units total) by mouth daily. 90 tablet 3    Magnesium 100 MG Oral Tab Take by mouth.      aspirin 81 MG Oral Tab EC Take 1 tablet (81 mg total) by mouth daily.        Past Medical History:    Arthritis    Cancer (HCC)    CME (cystoid macular edema)    IVK injections by Dr. Nava    Dermatochalasis of eyelid    Dermatochalasis of eyelid    H/O prostate biopsy    Hearing impairment    High cholesterol    History of vitrectomy    History of vitrectomy    Hyperlipidemia    Macular degeneration    Prostate cancer (HCC)    Pseudophakia of both eyes    Pseudophakia of both eyes    RPE mottling of macula left eye    Seizure disorder (HCC)    TIA (transient ischemic attack)    Unspecified essential  hypertension    Vitreous floaters of right eye    Vitreous floaters of right eye      Past Surgical History:   Procedure Laterality Date    Appendectomy      Bevacizumab, 10mg Bilateral     Avastin injection 21 OU by Dr. Magdaleno     Cataract extraction w/  intraocular lens implant Right 14    RJM    Cataract extraction w/  intraocular lens implant Left 5/3/10    RJM    Cholecystectomy      Colonoscopy      Per NG: adenoma    Knee replacement surgery Left approx. 2006    Other surgical history Right     Per NG: Post op CME -- IVKcryo kidney tumor    Vitrectomy,mechanical Left 9/1/10    for macular pucker with CME with Dr. Magdaleno    Vitrectomy,mechanical Right 16    Dr. Magdaleno    Vitrectomy,mechanical Left 1997     Macular Puckering with CME      Family History   Problem Relation Age of Onset    Diabetes Neg     Glaucoma Neg     Macular degeneration Neg       Social History     Socioeconomic History    Marital status:    Tobacco Use    Smoking status: Former     Current packs/day: 0.00     Types: Cigarettes     Quit date: 1960     Years since quittin.0    Smokeless tobacco: Never   Vaping Use    Vaping status: Never Used   Substance and Sexual Activity    Alcohol use: No     Alcohol/week: 0.0 standard drinks of alcohol    Drug use: No   Other Topics Concern    Caffeine Concern Yes     Comment: 1-2 cups coffee per day    Exercise Yes     Comment: Walks to the store, 1 mile, twice per week    Pt has a pacemaker No    Pt has a defibrillator No    Reaction to local anesthetic No           REVIEW OF SYSTEMS:     Denies nause, fever, chills  No calf pain  Denies chest pain or SOB      EXAM:   There were no vitals taken for this visit.  GENERAL: well developed, well nourished, in no apparent distress  EXTREMITIES:   1. Integument: Normal skin temperature and turgor. Toenails x10 are elongated, thickened and discolored with subungal derbi.     2. Vascular: Dorsalis pedis  two out of four bilateral and posterior tibial pulses two out of   four bilateral, capillary refill normal.   3. Musculoskeletal: All muscle groups are graded 5 out of 5 in the foot and ankle.   4. Neurological: Normal sharp dull sensation; reflexes normal.             ASSESSMENT AND PLAN:   Diagnoses and all orders for this visit:    Onychomycosis    Nail dystrophy    Gait instability    Pain in both feet                Plan:       -Patient examined, chart history reviewed.  -Discussed importance of proper pedal hygiene and regular foot checks  -Sharply debrided nails x10 with a sterile nail nipper achieving a 20% reduction in thickness and length, without incident.   -Ambulate with supportive shoes and inserts and avoid walking barefoot.  -Educated patient on acute signs of infection advised patient to seek immediate medical attention if symptoms arise.    RTC in 3 months       The patient indicates understanding of these issues and agrees to the plan.        Alicia Chin DPM

## 2025-02-13 RX ORDER — LISINOPRIL 10 MG/1
10 TABLET ORAL 2 TIMES DAILY
Qty: 180 TABLET | Refills: 0 | OUTPATIENT
Start: 2025-02-13

## 2025-02-13 NOTE — TELEPHONE ENCOUNTER
Please Review. Protocol Failed; No Protocol   BP Readings from Last 1 Encounters:   12/10/24 152/60        EGFRCR or GFRNAA > 50    GFR Evaluation  EGFRCR: 47 , resulted on 10/12/2024  Requested Prescriptions   Pending Prescriptions Disp Refills    lisinopril 10 MG Oral Tab 180 tablet 3     Sig: Take 1 tablet (10 mg total) by mouth 2 (two) times daily.       Hypertension Medications Protocol Failed - 2/13/2025  3:19 PM        Failed - Last BP reading less than 140/90     BP Readings from Last 1 Encounters:   12/10/24 152/60               Failed - EGFRCR or GFRNAA > 50     GFR Evaluation  EGFRCR: 47 , resulted on 10/12/2024          Passed - CMP or BMP in past 12 months        Passed - In person appointment or virtual visit in the past 12 mos or appointment in next 3 mos     Recent Outpatient Visits              2 weeks ago Onychomycosis    [facility] Alicia Chin DPM    Office Visit    2 months ago [facility]    [facility]    5 months [facility]    [facility]    7 months ago Pain in both feet    [facility] Alicia Waterman DPM    Office Visit    8 months ago Renal insufficiency    [facility] Khushboo Auguste MD    Office Visit          Future Appointments         Provider Department Appt Notes    In 1 month [facility].    In 1 month [facility]    In 1 month [facility] f/u                    Passed - Medication is active on med list                Future Appointments         Provider Department Appt Notes    In 1 month Carroll Whipple MD [facility] Follow up with the condition.    In 1 month Khushboo Anne MD [facility] follow up    In 1 month Alicia Chin DPM [facility] 2 month f/u          Recent Outpatient Visits              2 weeks ago Onychomycosis    [facility] [place] Alicia Chin DPM    Office Visit    2 months ago Renal insufficiency    [facility] Khushboo Anne MD    Office Visit    5 months ago [facility] both [facility]    Office Visit    7 months ago [facility] in both feet    [facility]    Office Visit    8 months ago Renal insufficiency    [facility] Anne MD    Office Visit

## 2025-02-14 RX ORDER — LISINOPRIL 10 MG/1
10 TABLET ORAL 2 TIMES DAILY
Qty: 180 TABLET | Refills: 3 | Status: SHIPPED | OUTPATIENT
Start: 2025-02-14

## 2025-03-12 ENCOUNTER — LAB ENCOUNTER (OUTPATIENT)
Dept: LAB | Facility: HOSPITAL | Age: OVER 89
End: 2025-03-12
Attending: FAMILY MEDICINE
Payer: MEDICARE

## 2025-03-12 DIAGNOSIS — N28.9 RENAL INSUFFICIENCY: ICD-10-CM

## 2025-03-12 LAB
ANION GAP SERPL CALC-SCNC: 7 MMOL/L (ref 0–18)
BUN BLD-MCNC: 23 MG/DL (ref 9–23)
BUN/CREAT SERPL: 16.4 (ref 10–20)
CALCIUM BLD-MCNC: 8.7 MG/DL (ref 8.7–10.4)
CHLORIDE SERPL-SCNC: 107 MMOL/L (ref 98–112)
CO2 SERPL-SCNC: 28 MMOL/L (ref 21–32)
CREAT BLD-MCNC: 1.4 MG/DL
EGFRCR SERPLBLD CKD-EPI 2021: 46 ML/MIN/1.73M2 (ref 60–?)
FASTING STATUS PATIENT QL REPORTED: NO
GLUCOSE BLD-MCNC: 88 MG/DL (ref 70–99)
OSMOLALITY SERPL CALC.SUM OF ELEC: 297 MOSM/KG (ref 275–295)
POTASSIUM SERPL-SCNC: 4.1 MMOL/L (ref 3.5–5.1)
SODIUM SERPL-SCNC: 142 MMOL/L (ref 136–145)

## 2025-03-12 PROCEDURE — 36415 COLL VENOUS BLD VENIPUNCTURE: CPT

## 2025-03-12 PROCEDURE — 80048 BASIC METABOLIC PNL TOTAL CA: CPT

## 2025-03-25 ENCOUNTER — LAB ENCOUNTER (OUTPATIENT)
Dept: LAB | Facility: HOSPITAL | Age: OVER 89
End: 2025-03-25
Attending: Other
Payer: MEDICARE

## 2025-03-25 ENCOUNTER — OFFICE VISIT (OUTPATIENT)
Dept: FAMILY MEDICINE CLINIC | Facility: CLINIC | Age: OVER 89
End: 2025-03-25
Payer: MEDICARE

## 2025-03-25 ENCOUNTER — OFFICE VISIT (OUTPATIENT)
Dept: NEUROLOGY | Facility: CLINIC | Age: OVER 89
End: 2025-03-25
Payer: MEDICARE

## 2025-03-25 VITALS
BODY MASS INDEX: 33.68 KG/M2 | HEIGHT: 69 IN | OXYGEN SATURATION: 95 % | WEIGHT: 227.38 LBS | DIASTOLIC BLOOD PRESSURE: 67 MMHG | HEART RATE: 56 BPM | SYSTOLIC BLOOD PRESSURE: 141 MMHG

## 2025-03-25 DIAGNOSIS — L98.9 SKIN LESION: Primary | ICD-10-CM

## 2025-03-25 DIAGNOSIS — G40.909 SEIZURE DISORDER (HCC): Primary | ICD-10-CM

## 2025-03-25 DIAGNOSIS — G40.909 SEIZURE DISORDER (HCC): ICD-10-CM

## 2025-03-25 LAB
ALBUMIN SERPL-MCNC: 3.9 G/DL (ref 3.2–4.8)
ALBUMIN/GLOB SERPL: 1.7 {RATIO} (ref 1–2)
ALP LIVER SERPL-CCNC: 70 U/L
ALT SERPL-CCNC: 18 U/L
ANION GAP SERPL CALC-SCNC: 6 MMOL/L (ref 0–18)
AST SERPL-CCNC: 19 U/L (ref ?–34)
BASOPHILS # BLD AUTO: 0.04 X10(3) UL (ref 0–0.2)
BASOPHILS NFR BLD AUTO: 0.6 %
BILIRUB SERPL-MCNC: 0.6 MG/DL (ref 0.2–0.9)
BUN BLD-MCNC: 26 MG/DL (ref 9–23)
BUN/CREAT SERPL: 17.8 (ref 10–20)
CALCIUM BLD-MCNC: 9.2 MG/DL (ref 8.7–10.4)
CHLORIDE SERPL-SCNC: 108 MMOL/L (ref 98–112)
CO2 SERPL-SCNC: 30 MMOL/L (ref 21–32)
CREAT BLD-MCNC: 1.46 MG/DL
DEPRECATED RDW RBC AUTO: 39.1 FL (ref 35.1–46.3)
EGFRCR SERPLBLD CKD-EPI 2021: 44 ML/MIN/1.73M2 (ref 60–?)
EOSINOPHIL # BLD AUTO: 0.09 X10(3) UL (ref 0–0.7)
EOSINOPHIL NFR BLD AUTO: 1.4 %
ERYTHROCYTE [DISTWIDTH] IN BLOOD BY AUTOMATED COUNT: 12.3 % (ref 11–15)
FASTING STATUS PATIENT QL REPORTED: YES
GLOBULIN PLAS-MCNC: 2.3 G/DL (ref 2–3.5)
GLUCOSE BLD-MCNC: 86 MG/DL (ref 70–99)
HCT VFR BLD AUTO: 42 %
HGB BLD-MCNC: 14.3 G/DL
IMM GRANULOCYTES # BLD AUTO: 0.02 X10(3) UL (ref 0–1)
IMM GRANULOCYTES NFR BLD: 0.3 %
LYMPHOCYTES # BLD AUTO: 1.15 X10(3) UL (ref 1–4)
LYMPHOCYTES NFR BLD AUTO: 18.2 %
MCH RBC QN AUTO: 29.6 PG (ref 26–34)
MCHC RBC AUTO-ENTMCNC: 34 G/DL (ref 31–37)
MCV RBC AUTO: 87 FL
MONOCYTES # BLD AUTO: 0.61 X10(3) UL (ref 0.1–1)
MONOCYTES NFR BLD AUTO: 9.7 %
NEUTROPHILS # BLD AUTO: 4.41 X10 (3) UL (ref 1.5–7.7)
NEUTROPHILS # BLD AUTO: 4.41 X10(3) UL (ref 1.5–7.7)
NEUTROPHILS NFR BLD AUTO: 69.8 %
OSMOLALITY SERPL CALC.SUM OF ELEC: 302 MOSM/KG (ref 275–295)
PLATELET # BLD AUTO: 212 10(3)UL (ref 150–450)
POTASSIUM SERPL-SCNC: 4.5 MMOL/L (ref 3.5–5.1)
PROT SERPL-MCNC: 6.2 G/DL (ref 5.7–8.2)
RBC # BLD AUTO: 4.83 X10(6)UL
SODIUM SERPL-SCNC: 144 MMOL/L (ref 136–145)
VALPROATE SERPL-MCNC: 29.7 UG/ML (ref 50–100)
WBC # BLD AUTO: 6.3 X10(3) UL (ref 4–11)

## 2025-03-25 PROCEDURE — 99214 OFFICE O/P EST MOD 30 MIN: CPT | Performed by: FAMILY MEDICINE

## 2025-03-25 PROCEDURE — 99214 OFFICE O/P EST MOD 30 MIN: CPT | Performed by: OTHER

## 2025-03-25 PROCEDURE — 80053 COMPREHEN METABOLIC PANEL: CPT | Performed by: OTHER

## 2025-03-25 PROCEDURE — 36415 COLL VENOUS BLD VENIPUNCTURE: CPT | Performed by: OTHER

## 2025-03-25 PROCEDURE — 80164 ASSAY DIPROPYLACETIC ACD TOT: CPT

## 2025-03-25 PROCEDURE — 80177 DRUG SCRN QUAN LEVETIRACETAM: CPT

## 2025-03-25 PROCEDURE — G2211 COMPLEX E/M VISIT ADD ON: HCPCS | Performed by: OTHER

## 2025-03-25 PROCEDURE — 85025 COMPLETE CBC W/AUTO DIFF WBC: CPT | Performed by: OTHER

## 2025-03-25 RX ORDER — DIVALPROEX SODIUM 500 MG/1
500 TABLET, FILM COATED, EXTENDED RELEASE ORAL DAILY
Qty: 90 TABLET | Refills: 3 | Status: SHIPPED | OUTPATIENT
Start: 2025-03-25

## 2025-03-25 RX ORDER — LEVETIRACETAM 750 MG/1
750 TABLET ORAL 2 TIMES DAILY
Qty: 180 TABLET | Refills: 3 | Status: SHIPPED | OUTPATIENT
Start: 2025-03-25

## 2025-03-25 NOTE — PROGRESS NOTES
Neurology Follow up Visit     Referred By: Dr. Rosales ref. provider found    Chief Complaint:   Chief Complaint   Patient presents with    Follow - Up     LOV 3/25/24. F/U for seizure disorder.        HPI:     Casa Lozano is a 89 year old male, who presents for follow-up of epilepsy.  Patient with a history of epilepsy for many years.  He typically starts with unusual distinct smell sensation,  warm sensation coming over him, sometimes sweating.  After that he gets dazed and occasionally he will lose consciousness and has a staring episode.  He does not have typically any tonic-clonic events.  He has been seeing Dr. Escobar in the past.  He has been on Keppra 750 mg, at one point in the past he was in the thousand milligrams, and apparently while it did control his epilepsy he might have felt more groggy and drowsiness and therefore the dose was decreased in September 2017 back to 750 mg twice a day.  He has done well with it except in September 2018 when he had at least 3 or 4 staring episodes and was brought to the hospital.  He does live on his own and that particular day he was surrounded by family due to some event planning.  Patient denied any focal neurological symptoms.  He does have history of poor sleep, frequent and vivid dreams, he feels tired and not rested and takes frequent naps in the daytime.    At this point continues EEG was done for 24 hours.  No obvious seizure activity but sleep was clearly disturbed.  Additionally patient was started on Depakote, later the dose of Keppra was decreased from 1000 mg down to 750 mg twice a day.  He is has done quite well in terms of his seizures since then as reported in March 2023.  He was seen Dr Pearce and Dr. Miner in the interim.    Patient also has had sleep study done in September 2018, that showed moderate if not severe obstructive sleep apnea, patient however he declined to be treated at that time with a CPAP machine.  He was reporting significant  tiredness in the follow-up visit in March 2023.  Level of Depakote was slightly low, level of Keppra was slightly high.    Patient came back for follow-up in March 2024 no seizures, still feeling tired.  Doing relatively well otherwise.  Patient came back for follow up in March 2025, no seizures, minimal tremors.     Past Medical History:    Arthritis    Cancer (HCC)    CME (cystoid macular edema)    IVK injections by Dr. Nava    Dermatochalasis of eyelid    Dermatochalasis of eyelid    H/O prostate biopsy    Hearing impairment    High cholesterol    History of vitrectomy    History of vitrectomy    Hyperlipidemia    Macular degeneration    Prostate cancer (HCC)    Pseudophakia of both eyes    Pseudophakia of both eyes    RPE mottling of macula left eye    Seizure disorder (HCC)    TIA (transient ischemic attack)    Unspecified essential hypertension    Vitreous floaters of right eye    Vitreous floaters of right eye       Past Surgical History:   Procedure Laterality Date    Appendectomy      Bevacizumab, 10mg Bilateral 2021    Avastin injection 9/16/21 OU by Dr. Magdaleno     Cataract extraction w/  intraocular lens implant Right 2/17/14    RJM    Cataract extraction w/  intraocular lens implant Left 5/3/10    RJM    Cholecystectomy      Colonoscopy      Per NG: adenoma    Knee replacement surgery Left approx. 2006    Other surgical history Right 2014    Per NG: Post op CME -- IVKcryo kidney tumor    Vitrectomy,mechanical Left 9/1/10    for macular pucker with CME with Dr. Magdaleno    Vitrectomy,mechanical Right 5/18/16    Dr. Magdaleno    Vitrectomy,mechanical Left 1997 / 2010     Macular Puckering with CME       Social history:  History   Smoking Status    Former    Types: Cigarettes   Smokeless Tobacco    Never     History   Alcohol Use No     History   Drug Use No       Family History   Problem Relation Age of Onset    Diabetes Neg     Glaucoma Neg     Macular degeneration Neg          Current Outpatient  Medications:     lisinopril 10 MG Oral Tab, Take 1 tablet (10 mg total) by mouth 2 (two) times daily., Disp: 180 tablet, Rfl: 3    levothyroxine 50 MCG Oral Tab, Take 1 tablet (50 mcg total) by mouth before breakfast., Disp: 90 tablet, Rfl: 3    metoprolol succinate ER 50 MG Oral Tablet 24 Hr, Take 1 tablet (50 mg total) by mouth daily., Disp: 90 tablet, Rfl: 0    divalproex  MG Oral Tablet 24 Hr, Take 1 tablet (500 mg total) by mouth daily., Disp: 90 tablet, Rfl: 3    levetiracetam 750 MG Oral Tab, Take 1 tablet (750 mg total) by mouth 2 (two) times daily., Disp: 180 tablet, Rfl: 3    amLODIPine 5 MG Oral Tab, Take 1 tablet (5 mg total) by mouth daily., Disp: 90 tablet, Rfl: 0    cholecalciferol 50 MCG (2000 UT) Oral Tab, Take 1 tablet (2,000 Units total) by mouth daily., Disp: 90 tablet, Rfl: 3    Magnesium 100 MG Oral Tab, Take by mouth., Disp: , Rfl:     aspirin 81 MG Oral Tab EC, Take 1 tablet (81 mg total) by mouth daily., Disp: , Rfl:     No Known Allergies    ROS:   As in HPI, the rest of the 14 system review was done and was negative      Physical Exam:  There were no vitals filed for this visit.      General: No apparent distress, well nourished, well groomed.  Head- Normocephalic, atraumatic  Eyes- No redness or swelling  ENT- Hearing intake, normal glutition  Neck- No masses or adenopathy    Neurological:     Mental Status- Alert and oriented x3.  Normal attention span and concentration  Thought process intact  Memory intact- recent and remote  Mood intact  Fund of knowledge appropriate for education and age    Language intact including: comprehension, naming, repetition, vocabulary    Cranial Nerves:    VII. Face symmetric, no facial weakness  VIII. Hearing intact to whisper.  IX. Pallet elevates symmetrically.  XI. Shoulder shrug is intact  XII. Tongue is midline    Motor Exam:  Muscle tone normal  No atrophy or fasciculations  Strength- upper extremities 5/5 proximally and distally                     Rapid alternating movements intact    Gait:  Normal posture  Normal physiologic      Labs:    Lab Results   Component Value Date    TSH 3.336 05/31/2024     Lab Results   Component Value Date    HDL 33 09/10/2018     (H) 09/10/2018    TRIG 85 09/10/2018     Lab Results   Component Value Date    HGB 15.6 05/31/2024    HCT 44.2 05/31/2024    MCV 87.7 05/31/2024    WBC 7.7 05/31/2024    .0 05/31/2024      Lab Results   Component Value Date    BUN 23 03/12/2025    CA 8.7 03/12/2025    ALT 15 05/31/2024    AST 25 05/31/2024    ALKPHOS 82 01/15/2016    ALB 4.1 05/31/2024     03/12/2025    K 4.1 03/12/2025     03/12/2025    CO2 28.0 03/12/2025      I have reviewed labs.      Assessment   1. Seizure disorder (HCC)  Well-controlled with current combination of Depakote and Keppra.  We talked about expectations and prognosis.  Importance of treating sleep apnea was emphasized.  In the meantime medications dosing will be refilled the same dose, but levels will be done.    - divalproex  MG Oral Tablet 24 Hr; Take 1 tablet (500 mg total) by mouth daily.  Dispense: 90 tablet; Refill: 3  - levetiracetam 750 MG Oral Tab; Take 1 tablet (750 mg total) by mouth 2 (two) times daily.  Dispense: 180 tablet; Refill: 3  - CBC WITH DIFFERENTIAL WITH PLATELET  - COMP METABOLIC PANEL (14)  - LEVETIRACETAM, S; Future  - VALPROIC ACID, (DEPAKENE); Future           Education and counseling provided to patient. Instructed patient to call my office or seek medical attention immediately if symptoms worsen.  Patient verbalized understanding of information given. All questions were answered. All side effects of drugs were discussed.     Return to clinic in: No follow-ups on file.    Carroll Whipple MD

## 2025-03-25 NOTE — PROGRESS NOTES
Subjective:   Patient ID: Casa Lozano is a 89 year old male.    HPI  He is here for f/u   Kidney function stable   Doing well   Bp better   History/Other:   Review of Systems    Constitutional: Negative.  Negative for activity change, appetite change, diaphoresis and fatigue.     Respiratory: Negative.  Negative for apnea, cough, chest tightness and shortness of breath.    Cardiovascular: Negative.  Negative for chest pain, palpitations and leg swelling.   Gastrointestinal: Negative.  Negative for abdominal pain.     Current Outpatient Medications   Medication Sig Dispense Refill    divalproex  MG Oral Tablet 24 Hr Take 1 tablet (500 mg total) by mouth daily. 90 tablet 3    levetiracetam 750 MG Oral Tab Take 1 tablet (750 mg total) by mouth 2 (two) times daily. 180 tablet 3    lisinopril 10 MG Oral Tab Take 1 tablet (10 mg total) by mouth 2 (two) times daily. 180 tablet 3    levothyroxine 50 MCG Oral Tab Take 1 tablet (50 mcg total) by mouth before breakfast. 90 tablet 3    metoprolol succinate ER 50 MG Oral Tablet 24 Hr Take 1 tablet (50 mg total) by mouth daily. 90 tablet 0    amLODIPine 5 MG Oral Tab Take 1 tablet (5 mg total) by mouth daily. 90 tablet 0    cholecalciferol 50 MCG (2000 UT) Oral Tab Take 1 tablet (2,000 Units total) by mouth daily. 90 tablet 3    Magnesium 100 MG Oral Tab Take by mouth.      aspirin 81 MG Oral Tab EC Take 1 tablet (81 mg total) by mouth daily.       Allergies:Allergies[1]    Objective:   Physical Exam  Cardiovascular:      Rate and Rhythm: Normal rate and regular rhythm.      Pulses: Normal pulses.      Heart sounds: Normal heart sounds.   Pulmonary:      Effort: Pulmonary effort is normal.      Breath sounds: Normal breath sounds.   Neurological:      Mental Status: He is alert.         Assessment & Plan:   1. Skin lesion    To see dermatology   2. Hypertension controlled cpm  3. Renal insuficiency stable cpm    No orders of the defined types were placed in this  encounter.      Meds This Visit:  Requested Prescriptions      No prescriptions requested or ordered in this encounter       Imaging & Referrals:  [facility] - INTERNAL         [1] No Known Allergies

## 2025-03-26 LAB — LEVETIRACETAM LVL: 44.3 UG/ML

## 2025-04-17 ENCOUNTER — OFFICE VISIT (OUTPATIENT)
Dept: PODIATRY CLINIC | Facility: CLINIC | Age: OVER 89
End: 2025-04-17
Payer: MEDICARE

## 2025-04-17 DIAGNOSIS — L60.3 NAIL DYSTROPHY: ICD-10-CM

## 2025-04-17 DIAGNOSIS — M79.672 PAIN IN BOTH FEET: Primary | ICD-10-CM

## 2025-04-17 DIAGNOSIS — H35.30 ARMD (AGE-RELATED MACULAR DEGENERATION), BILATERAL: ICD-10-CM

## 2025-04-17 DIAGNOSIS — B35.1 ONYCHOMYCOSIS: ICD-10-CM

## 2025-04-17 DIAGNOSIS — M79.671 PAIN IN BOTH FEET: Primary | ICD-10-CM

## 2025-04-17 DIAGNOSIS — R26.81 GAIT INSTABILITY: ICD-10-CM

## 2025-04-17 PROCEDURE — 99213 OFFICE O/P EST LOW 20 MIN: CPT | Performed by: STUDENT IN AN ORGANIZED HEALTH CARE EDUCATION/TRAINING PROGRAM

## 2025-04-17 NOTE — PROGRESS NOTES
[facility] Podiatry  Progress Note      Casa Lozano is a 89 year old male.   Chief Complaint   Patient presents with    Toenail Care     Routine nail care and foot check - pt reports no pain, discomfort, or injuries.              HPI:     Patient is a pleasant 89 year old male who presents to clinic today for toenail care to bilateral feet.  He is accompanied by his daughter. Admits to elongated toenails he is unable to trim himself.     Allergies: Patient has no known allergies.    Current Outpatient Medications   Medication Sig Dispense Refill    divalproex  MG Oral Tablet 24 Hr Take 1 tablet (500 mg total) by mouth daily. 90 tablet 3    levetiracetam 750 MG Oral Tab Take 1 tablet (750 mg total) by mouth 2 (two) times daily. 180 tablet 3    lisinopril 10 MG Oral Tab Take 1 tablet (10 mg total) by mouth 2 (two) times daily. 180 tablet 3    levothyroxine 50 MCG Oral Tab Take 1 tablet (50 mcg total) by mouth before breakfast. 90 tablet 3    metoprolol succinate ER 50 MG Oral Tablet 24 Hr Take 1 tablet (50 mg total) by mouth daily. 90 tablet 0    amLODIPine 5 MG Oral Tab Take 1 tablet (5 mg total) by mouth daily. 90 tablet 0    cholecalciferol 50 MCG (2000 UT) Oral Tab Take 1 tablet (2,000 Units total) by mouth daily. 90 tablet 3    Magnesium 100 MG Oral Tab Take by mouth.      aspirin 81 MG Oral Tab EC Take 1 tablet (81 mg total) by mouth daily.        Past Medical History:    Arthritis    Cancer (HCC)    CME (cystoid macular edema)    IVK injections by Dr. Nava    Dermatochalasis of eyelid    Dermatochalasis of eyelid    H/O prostate biopsy    Hearing impairment    High cholesterol    History of vitrectomy    History of vitrectomy    Hyperlipidemia    Macular degeneration    Obesity    Prostate cancer (HCC)    Pseudophakia of both eyes    Pseudophakia of both eyes    RPE mottling of macula left eye    Seizure disorder (HCC)    TIA (transient ischemic attack)    Unspecified essential hypertension     Visual impairment    Vitreous floaters of right eye    Vitreous floaters of right eye      Past Surgical History:   Procedure Laterality Date    Appendectomy      Bevacizumab, 10mg Bilateral     Avastin injection 21 OU by Dr. Magdaleno     Cataract      Cataract extraction w/  intraocular lens implant Right 2014    RJM    Cataract extraction w/  intraocular lens implant Left 2010    RJM    Cholecystectomy      Colonoscopy      Per NG: adenoma    Knee replacement surgery Left approx. 2006    Other surgical history Right     Per NG: Post op CME -- IVKcryo kidney tumor    Vasectomy      Vitrectomy,mechanical Left 2010    for macular pucker with CME with Dr. Magdaleno    Vitrectomy,mechanical Right 2016    Dr. Magdaleno    Vitrectomy,mechanical Left 1997     Macular Puckering with CME      Family History   Problem Relation Age of Onset    Diabetes Neg     Glaucoma Neg     Macular degeneration Neg       Social History     Socioeconomic History    Marital status:    Tobacco Use    Smoking status: Former     Current packs/day: 0.00     Types: Cigarettes     Quit date: 1960     Years since quittin.2    Smokeless tobacco: Never   Vaping Use    Vaping status: Never Used   Substance and Sexual Activity    Alcohol use: No    Drug use: No   Other Topics Concern    Caffeine Concern Yes     Comment: 1-2 cups coffee per day    Exercise Yes     Comment: Walks to the store, 1 mile, twice per week    Pt has a pacemaker No    Pt has a defibrillator No    Reaction to local anesthetic No           REVIEW OF SYSTEMS:     Denies nause, fever, chills  No calf pain  Denies chest pain or SOB      EXAM:   There were no vitals taken for this visit.  GENERAL: well developed, well nourished, in no apparent distress  EXTREMITIES:   1. Integument: Normal skin temperature and turgor. Toenails x10 are elongated, thickened and discolored with subungal derbi.     2. Vascular:  Dorsalis pedis two out of four bilateral and posterior tibial pulses two out of   four bilateral, capillary refill normal.   3. Musculoskeletal: All muscle groups are graded 5 out of 5 in the foot and ankle.   4. Neurological: Normal sharp dull sensation; reflexes normal.             ASSESSMENT AND PLAN:   Diagnoses and all orders for this visit:    Pain in both feet    ARMD (age-related macular degeneration), bilateral    Nail dystrophy    Gait instability    Onychomycosis                  Plan:       -Patient examined, chart history reviewed.  -Discussed importance of proper pedal hygiene and regular foot checks  -Sharply debrided nails x10 with a sterile nail nipper achieving a 20% reduction in thickness and length, without incident.   -Ambulate with supportive shoes and inserts and avoid walking barefoot.  -Educated patient on acute signs of infection advised patient to seek immediate medical attention if symptoms arise.    RTC in 3 months       The patient indicates understanding of these issues and agrees to the plan.        Alicia Chin DPM

## 2025-06-23 ENCOUNTER — TELEPHONE (OUTPATIENT)
Dept: FAMILY MEDICINE CLINIC | Facility: CLINIC | Age: OVER 89
End: 2025-06-23

## 2025-07-17 ENCOUNTER — OFFICE VISIT (OUTPATIENT)
Dept: PODIATRY CLINIC | Facility: CLINIC | Age: OVER 89
End: 2025-07-17
Payer: MEDICARE

## 2025-07-17 DIAGNOSIS — M79.672 PAIN IN BOTH FEET: ICD-10-CM

## 2025-07-17 DIAGNOSIS — M79.671 PAIN IN BOTH FEET: ICD-10-CM

## 2025-07-17 DIAGNOSIS — R26.81 GAIT INSTABILITY: Primary | ICD-10-CM

## 2025-07-17 DIAGNOSIS — L60.3 NAIL DYSTROPHY: ICD-10-CM

## 2025-07-17 PROCEDURE — 99213 OFFICE O/P EST LOW 20 MIN: CPT | Performed by: STUDENT IN AN ORGANIZED HEALTH CARE EDUCATION/TRAINING PROGRAM

## 2025-07-17 NOTE — PROGRESS NOTES
[facility]  Progress Note      Casa Lozano is a 89 year old male.   Chief Complaint   Patient presents with    Toenail Care     LOV 3/25/25 with PCP Dr. Anne             HPI:     Patient is a pleasant 89 year old male who presents to clinic today for toenail care to bilateral feet.  He is accompanied by his daughter. Admits to elongated toenails he is unable to trim himself.     Allergies: Patient has no known allergies.    Current Outpatient Medications   Medication Sig Dispense Refill    divalproex  MG Oral Tablet 24 Hr Take 1 tablet (500 mg total) by mouth daily. 90 tablet 3    levetiracetam 750 MG Oral Tab Take 1 tablet (750 mg total) by mouth 2 (two) times daily. 180 tablet 3    lisinopril 10 MG Oral Tab Take 1 tablet (10 mg total) by mouth 2 (two) times daily. 180 tablet 3    levothyroxine 50 MCG Oral Tab Take 1 tablet (50 mcg total) by mouth before breakfast. 90 tablet 3    metoprolol succinate ER 50 MG Oral Tablet 24 Hr Take 1 tablet (50 mg total) by mouth daily. 90 tablet 0    amLODIPine 5 MG Oral Tab Take 1 tablet (5 mg total) by mouth daily. 90 tablet 0    cholecalciferol 50 MCG (2000 UT) Oral Tab Take 1 tablet (2,000 Units total) by mouth daily. 90 tablet 3    Magnesium 100 MG Oral Tab Take by mouth.      aspirin 81 MG Oral Tab EC Take 1 tablet (81 mg total) by mouth daily.        Past Medical History:    Arthritis    Cancer (HCC)    CME (cystoid macular edema)    IVK injections by Dr. Nava    Dermatochalasis of eyelid    Dermatochalasis of eyelid    H/O prostate biopsy    Hearing impairment    High cholesterol    History of vitrectomy    History of vitrectomy    Hyperlipidemia    Macular degeneration    Obesity    Prostate cancer (HCC)    Pseudophakia of both eyes    Pseudophakia of both eyes    RPE mottling of macula left eye    Seizure disorder (HCC)    TIA (transient ischemic attack)    Unspecified essential hypertension    Visual impairment    Vitreous floaters of right  eye    Vitreous floaters of right eye      Past Surgical History:   Procedure Laterality Date    Appendectomy      Bevacizumab, 10mg Bilateral     Avastin injection 21 OU by Dr. Magdaleno     Cataract      Cataract extraction w/  intraocular lens implant Right 2014    RJM    Cataract extraction w/  intraocular lens implant Left 2010    RJM    Cholecystectomy      Colonoscopy      Per NG: adenoma    Knee replacement surgery Left approx. 2006    Other surgical history Right     Per NG: Post op CME -- IVKcryo kidney tumor    Vasectomy      Vitrectomy,mechanical Left 2010    for macular pucker with CME with Dr. Magdaleno    Vitrectomy,mechanical Right 2016    Dr. Magdaleno    Vitrectomy,mechanical Left 1997     Macular Puckering with CME      Family History   Problem Relation Age of Onset    Diabetes Neg     Glaucoma Neg     Macular degeneration Neg       Social History     Socioeconomic History    Marital status:    Tobacco Use    Smoking status: Former     Current packs/day: 0.00     Types: Cigarettes     Quit date: 1960     Years since quittin.5    Smokeless tobacco: Never   Vaping Use    Vaping status: Never Used   Substance and Sexual Activity    Alcohol use: No    Drug use: No   Other Topics Concern    Caffeine Concern Yes     Comment: 1-2 cups coffee per day    Exercise Yes     Comment: Walks to the store, 1 mile, twice per week    Pt has a pacemaker No    Pt has a defibrillator No    Reaction to local anesthetic No           REVIEW OF SYSTEMS:     Denies nause, fever, chills  No calf pain  Denies chest pain or SOB      EXAM:   There were no vitals taken for this visit.  GENERAL: well developed, well nourished, in no apparent distress  EXTREMITIES:   1. Integument: Normal skin temperature and turgor. Toenails x10 are elongated, thickened and discolored with subungal derbi.     2. Vascular: Dorsalis pedis two out of four bilateral and posterior  tibial pulses two out of   four bilateral, capillary refill normal.   3. Musculoskeletal: All muscle groups are graded 5 out of 5 in the foot and ankle.   4. Neurological: Normal sharp dull sensation; reflexes normal.             ASSESSMENT AND PLAN:   Diagnoses and all orders for this visit:    Gait instability    Pain in both feet    Nail dystrophy                    Plan:       -Patient examined, chart history reviewed.  -Discussed importance of proper pedal hygiene and regular foot checks  -Sharply debrided nails x10 with a sterile nail nipper achieving a 20% reduction in thickness and length, without incident.   -Ambulate with supportive shoes and inserts and avoid walking barefoot.  -Educated patient on acute signs of infection advised patient to seek immediate medical attention if symptoms arise.    RTC in 3 months       The patient indicates understanding of these issues and agrees to the plan.        Alicia Chin DPM

## 2025-08-25 RX ORDER — AMLODIPINE BESYLATE 5 MG/1
5 TABLET ORAL DAILY
Qty: 90 TABLET | Refills: 3 | Status: SHIPPED | OUTPATIENT
Start: 2025-08-25

## (undated) NOTE — Clinical Note
Pt will be coming in for HFU appt on 9/19/18- he is refusing to reschedule to another time that day or another date. Thank you!

## (undated) NOTE — MR AVS SNAPSHOT
[facility] SPECIALTY [facility] - [place] [address] [address] [address] [phone]               Thank you for choosing us for your health care visit with Veronique Lainez MD.  We are glad to serve you and happy to provide you with this summary of yo Assoc Dx:  Essential hypertension [I10]           CBC W Differential W Platelet [E]    Complete by:  Jan 31, 2017 (Approximate)    Assoc Dx:  Essential hypertension [I10]                 MyChart     Visit [facility]  You can access your MyChart to more activ increments are effective and add up over the week   2 ½ hours per week – spread out over time Use a lizbeth to keep you motivated   Don’t forget strength training with weights and resistance Set goals and track your progress   You don’t need to join a gym.

## (undated) NOTE — LETTER
24      Patient: Casa Lozano  : 1929 Visit date: 3/7/2024    Dear Khushboo,      I examined your patient in consultation today.    As you know, he has an avulsion flap of the left leg, with necrosis of the flap.  There is no infection.    We started him on daily washings and Silvadene applications.  If this does not epithelialize, we will consider skin grafting.    Thank you for your kind referral. If I may answer any questions, please feel free to contact me.     Sincerely,   Guillaume Gregory MD     CC:   No Recipients

## (undated) NOTE — LETTER
No referring provider defined for this encounter. 06/28/21        Patient: Nereida Martinez   YOB: 1929   Date of Visit: 6/28/2021       Dear  Dr. Dexter Whitfield MD,      I evaluated Nereida Martinez in the office today.   Patient w

## (undated) NOTE — LETTER
9/26/2017    Patient: Lyndsey Olguin   MR Number: [id number]   YOB: 1929   Date of Visit: 9/26/2017   Physician: Harman Ortiz MD     Dear Medicare Patient:  Yasmine Schmidt TO BENEFICIARY:  Please know that while a refraction is i

## (undated) NOTE — MR AVS SNAPSHOT
[facility] SPECIALTY [facility] - [place] [address] [address] [address] [phone]               Thank you for choosing us for your health care visit with Georgia Hudson MD.  We are glad to serve you and happy to provide you with this summary of yo Commonly known as:  FLOMAX                Where to Get Your Medications      These medications were sent to [facility], [address], [phone]  [address] [place] [address], [address] [facility] [address]     Phone:

## (undated) NOTE — LETTER
18          Evelio Davis  :  1929      To Whom It May Concern:     This patient is currently under our care at the [facility] and was last seen by our neurology service as an inpatient during his hospitalization from

## (undated) NOTE — LETTER
05/16/19        [address] [facility]      Dear Adri Common records indicate that you have outstanding lab work and or testing that was ordered for you and has not yet been completed:  Orders Placed This Encounter      PSA

## (undated) NOTE — LETTER
6/26/2018    Patient: Ernestina Gage   MR Number: [id number]   YOB: 1929   Date of Visit: 6/26/2018   Physician: Ahmet Yao MD     Dear Medicare Patient:  Nicholas Daily TO BENEFICIARY:  Please know that while a refraction is i

## (undated) NOTE — LETTER
No referring provider defined for this encounter. 09/27/17        Patient: Jatin Thomas   YOB: 1929   Date of Visit: 9/27/2017       Dear  Dr. Jasper Montague MD,      Thank you for referring Jatin Thomas to my practice.   Pl intake 10 hours before bedtime to decrease nocturia. I also advised pt to follow up with PCP Dr. Meredith Lewis for his snoring as this may also contribute to nocturia. RECOMMENDATIONS AND TREATMENT PLAN      1.   Urine specimen today for complete urinalysis physician whether any of these medications can be taken in the morning, so there would be less need to drink liquids at bedtime. Mayur Boswell  is a very pleasant patient and I thoroughly enjoyed evaluating him  in consultation.   I thank you for send

## (undated) NOTE — LETTER
11/5/2020    Patient: Shawnee Tolentino   MR Number: [id number]   YOB: 1929   Date of Visit: 11/5/2020   Physician: Patsie Nageotte, MD     Dear Medicare Patient:  Charissa Bruner TO BENEFICIARY:  Please know that while a refraction is i

## (undated) NOTE — LETTER
06/13/20        [address] [facility]      Dear Martine Oppenheim records indicate that you have outstanding lab work and or testing that was ordered for you and has not yet been completed:  Orders Placed This Encounter      Basic

## (undated) NOTE — Clinical Note
April 24, 2017     Hattie Castillo [address] [place] [id number]      Dear Chiquita Reddy:    Below are the results from your recent visit:    Resulted Orders   COMP METABOLIC PANEL (14)   Result Value Ref Range    Glucose 87 70-99 mg/dL    Sodium 140 136

## (undated) NOTE — LETTER
[address] [place] [address]  Authorization for Invasive Procedures  1.  I hereby authorize Dr. Ramu Perdomo, my physician and whomever may be designated as the doctor's assistant, to perform the following operation and/or procedure:  COMPUTE potential risks that can occur: fever and allergic reactions, hemolytic reactions, transmission of disease such as hepatitis, AIDS, cytomegalovirus (CMV), and flluid overload.  In the event that I wish to have autologous transfusions of my own blood, or a d judgment of my physician.      Signature of Patient:  ________________________________________________ Date: _________Time: _________  Responsible person in case of minor or unconscious: _____________________________Relationship: ____________     Witness Si

## (undated) NOTE — LETTER
[facility]  Administration of Anesthesia  1. I, Valdemar Green, or _________________________________ acting on his behalf, (Patient) (Dependent/Representative) request to receive anesthesia for my pending procedure/operation/treatment.   A bleeding, seizure, cardiac arrest and death. 7. AWARENESS: I understand that it is possible (but unlikely) to have explicit memory of events from the operating room while under general anesthesia.   8. ELECTROCONVULSIVE THERAPY PATIENTS: This consent serve below affirms that prior to the time of the procedure, I have explained to the patient and/or his/her guardian, the risks and benefits of undergoing anesthesia, as well as any reasonable alternatives.     ___________________________________________________

## (undated) NOTE — LETTER
[id number] [facility]  [address], [address] [facility]. [address] [phone]        Dear Erick De La O MD,      I had the pleasure of seeing your patient, Jay Kerr on 9/14/2017.      Below please find a summa Fluticasone Propionate 50 MCG/ACT Nasal Suspension 2 sprays by Each Nare route daily. Disp: 1 Bottle Rfl: 3   aspirin (ASPIR-81) 81 MG Oral Tab EC Take 1 tablet by mouth daily. Disp:  Rfl:      No current facility-administered medications for this visit. Exercise                Yes    Comment:walks on Treadmill 2-3 times per week  Pt has a pacemaker      No  Pt has a defibrillator  No  Reaction to local anes* No    Social History Narrative    The patient does not use an assistive device. .      The patient Gait: Narrow based, negative Romberg’s sign. Can stand on heels and toes. ASSESSMENT AND PLAN:     Daiana Salinas 80year old male presents to clinic with history of a seizure disorder, well-controlled on Keppra.   The same dose of medication will be co